# Patient Record
Sex: MALE | Race: WHITE | NOT HISPANIC OR LATINO | Employment: OTHER | URBAN - METROPOLITAN AREA
[De-identification: names, ages, dates, MRNs, and addresses within clinical notes are randomized per-mention and may not be internally consistent; named-entity substitution may affect disease eponyms.]

---

## 2017-07-17 ENCOUNTER — ANESTHESIA EVENT (OUTPATIENT)
Dept: PERIOP | Facility: HOSPITAL | Age: 67
End: 2017-07-17
Payer: MEDICARE

## 2017-07-25 ENCOUNTER — HOSPITAL ENCOUNTER (OUTPATIENT)
Facility: HOSPITAL | Age: 67
Setting detail: OUTPATIENT SURGERY
Discharge: HOME | End: 2017-07-25
Attending: OTOLARYNGOLOGY | Admitting: OTOLARYNGOLOGY
Payer: MEDICARE

## 2017-07-25 ENCOUNTER — ANESTHESIA (OUTPATIENT)
Dept: PERIOP | Facility: HOSPITAL | Age: 67
End: 2017-07-25
Payer: MEDICARE

## 2017-07-25 VITALS
HEART RATE: 85 BPM | DIASTOLIC BLOOD PRESSURE: 61 MMHG | HEIGHT: 73 IN | RESPIRATION RATE: 18 BRPM | SYSTOLIC BLOOD PRESSURE: 124 MMHG | OXYGEN SATURATION: 97 % | WEIGHT: 210 LBS | TEMPERATURE: 97.6 F | BODY MASS INDEX: 27.83 KG/M2

## 2017-07-25 DIAGNOSIS — J34.2 DEVIATED NASAL SEPTUM: ICD-10-CM

## 2017-07-25 DIAGNOSIS — J34.3 HYPERTROPHY OF NASAL TURBINATES: ICD-10-CM

## 2017-07-25 PROCEDURE — 88311 DECALCIFY TISSUE: CPT | Performed by: OTOLARYNGOLOGY

## 2017-07-25 PROCEDURE — 88304 TISSUE EXAM BY PATHOLOGIST: CPT | Performed by: OTOLARYNGOLOGY

## 2017-07-25 RX ORDER — FENTANYL CITRATE 50 UG/ML
INJECTION, SOLUTION INTRAMUSCULAR; INTRAVENOUS AS NEEDED
Status: DISCONTINUED | OUTPATIENT
Start: 2017-07-25 | End: 2017-07-25 | Stop reason: SURG

## 2017-07-25 RX ORDER — GINSENG 100 MG
CAPSULE ORAL AS NEEDED
Status: DISCONTINUED | OUTPATIENT
Start: 2017-07-25 | End: 2017-07-25 | Stop reason: HOSPADM

## 2017-07-25 RX ORDER — FENTANYL CITRATE/PF 50 MCG/ML
50 SYRINGE (ML) INJECTION
Status: DISCONTINUED | OUTPATIENT
Start: 2017-07-25 | End: 2017-07-25 | Stop reason: HOSPADM

## 2017-07-25 RX ORDER — LIDOCAINE HYDROCHLORIDE 10 MG/ML
INJECTION, SOLUTION INFILTRATION; PERINEURAL AS NEEDED
Status: DISCONTINUED | OUTPATIENT
Start: 2017-07-25 | End: 2017-07-25 | Stop reason: SURG

## 2017-07-25 RX ORDER — MIDAZOLAM HYDROCHLORIDE 1 MG/ML
INJECTION INTRAMUSCULAR; INTRAVENOUS AS NEEDED
Status: DISCONTINUED | OUTPATIENT
Start: 2017-07-25 | End: 2017-07-25 | Stop reason: SURG

## 2017-07-25 RX ORDER — ALBUTEROL SULFATE 2.5 MG/3ML
2.5 SOLUTION RESPIRATORY (INHALATION) ONCE AS NEEDED
Status: DISCONTINUED | OUTPATIENT
Start: 2017-07-25 | End: 2017-07-25 | Stop reason: HOSPADM

## 2017-07-25 RX ORDER — PROPOFOL 10 MG/ML
INJECTION, EMULSION INTRAVENOUS AS NEEDED
Status: DISCONTINUED | OUTPATIENT
Start: 2017-07-25 | End: 2017-07-25 | Stop reason: SURG

## 2017-07-25 RX ORDER — LIDOCAINE HYDROCHLORIDE AND EPINEPHRINE 10; 10 MG/ML; UG/ML
INJECTION, SOLUTION INFILTRATION; PERINEURAL AS NEEDED
Status: DISCONTINUED | OUTPATIENT
Start: 2017-07-25 | End: 2017-07-25 | Stop reason: HOSPADM

## 2017-07-25 RX ORDER — EPHEDRINE SULFATE 50 MG/ML
INJECTION, SOLUTION INTRAVENOUS AS NEEDED
Status: DISCONTINUED | OUTPATIENT
Start: 2017-07-25 | End: 2017-07-25 | Stop reason: SURG

## 2017-07-25 RX ORDER — SODIUM CHLORIDE 9 MG/ML
125 INJECTION, SOLUTION INTRAVENOUS CONTINUOUS
Status: DISCONTINUED | OUTPATIENT
Start: 2017-07-25 | End: 2017-07-25 | Stop reason: HOSPADM

## 2017-07-25 RX ORDER — MAGNESIUM HYDROXIDE 1200 MG/15ML
LIQUID ORAL AS NEEDED
Status: DISCONTINUED | OUTPATIENT
Start: 2017-07-25 | End: 2017-07-25 | Stop reason: HOSPADM

## 2017-07-25 RX ORDER — CEPHALEXIN 500 MG/1
CAPSULE ORAL
Refills: 0
Start: 2017-07-25 | End: 2018-01-23

## 2017-07-25 RX ORDER — ROCURONIUM BROMIDE 10 MG/ML
INJECTION, SOLUTION INTRAVENOUS AS NEEDED
Status: DISCONTINUED | OUTPATIENT
Start: 2017-07-25 | End: 2017-07-25 | Stop reason: SURG

## 2017-07-25 RX ORDER — ONDANSETRON 2 MG/ML
4 INJECTION INTRAMUSCULAR; INTRAVENOUS EVERY 6 HOURS PRN
Status: DISCONTINUED | OUTPATIENT
Start: 2017-07-25 | End: 2017-07-25 | Stop reason: HOSPADM

## 2017-07-25 RX ORDER — ONDANSETRON 2 MG/ML
INJECTION INTRAMUSCULAR; INTRAVENOUS AS NEEDED
Status: DISCONTINUED | OUTPATIENT
Start: 2017-07-25 | End: 2017-07-25 | Stop reason: SURG

## 2017-07-25 RX ORDER — MEPERIDINE HYDROCHLORIDE 50 MG/ML
12.5 INJECTION INTRAMUSCULAR; INTRAVENOUS; SUBCUTANEOUS AS NEEDED
Status: DISCONTINUED | OUTPATIENT
Start: 2017-07-25 | End: 2017-07-25 | Stop reason: HOSPADM

## 2017-07-25 RX ORDER — OXYCODONE HYDROCHLORIDE AND ACETAMINOPHEN 5; 325 MG/1; MG/1
2 TABLET ORAL EVERY 4 HOURS PRN
Status: DISCONTINUED | OUTPATIENT
Start: 2017-07-25 | End: 2017-07-25 | Stop reason: HOSPADM

## 2017-07-25 RX ORDER — OXYCODONE HYDROCHLORIDE AND ACETAMINOPHEN 5; 325 MG/1; MG/1
TABLET ORAL
Refills: 0
Start: 2017-07-25 | End: 2018-01-23

## 2017-07-25 RX ORDER — SODIUM CHLORIDE/ALOE VERA
GEL (GRAM) NASAL AS NEEDED
Status: DISCONTINUED | OUTPATIENT
Start: 2017-07-25 | End: 2017-07-25 | Stop reason: HOSPADM

## 2017-07-25 RX ORDER — GLYCOPYRROLATE 0.2 MG/ML
INJECTION INTRAMUSCULAR; INTRAVENOUS AS NEEDED
Status: DISCONTINUED | OUTPATIENT
Start: 2017-07-25 | End: 2017-07-25 | Stop reason: SURG

## 2017-07-25 RX ADMIN — MIDAZOLAM HYDROCHLORIDE 2 MG: 1 INJECTION, SOLUTION INTRAMUSCULAR; INTRAVENOUS at 08:00

## 2017-07-25 RX ADMIN — ONDANSETRON HYDROCHLORIDE 4 MG: 2 INJECTION, SOLUTION INTRAVENOUS at 08:16

## 2017-07-25 RX ADMIN — GLYCOPYRROLATE 0.4 MG: 0.2 INJECTION, SOLUTION INTRAMUSCULAR; INTRAVENOUS at 09:03

## 2017-07-25 RX ADMIN — FENTANYL CITRATE 100 MCG: 50 INJECTION, SOLUTION INTRAMUSCULAR; INTRAVENOUS at 08:05

## 2017-07-25 RX ADMIN — EPHEDRINE SULFATE 10 MG: 50 INJECTION, SOLUTION INTRAMUSCULAR; INTRAVENOUS; SUBCUTANEOUS at 08:35

## 2017-07-25 RX ADMIN — OXYCODONE HYDROCHLORIDE AND ACETAMINOPHEN 2 TABLET: 5; 325 TABLET ORAL at 10:41

## 2017-07-25 RX ADMIN — DEXAMETHASONE SODIUM PHOSPHATE 10 MG: 10 INJECTION INTRAMUSCULAR; INTRAVENOUS at 08:16

## 2017-07-25 RX ADMIN — SODIUM CHLORIDE 125 ML/HR: 0.9 INJECTION, SOLUTION INTRAVENOUS at 07:16

## 2017-07-25 RX ADMIN — LIDOCAINE HYDROCHLORIDE 40 MG: 10 INJECTION, SOLUTION INFILTRATION; PERINEURAL at 08:00

## 2017-07-25 RX ADMIN — NEOSTIGMINE METHYLSULFATE 3 MG: 1 INJECTION, SOLUTION INTRAMUSCULAR; INTRAVENOUS; SUBCUTANEOUS at 09:03

## 2017-07-25 RX ADMIN — ROCURONIUM BROMIDE 30 MG: 10 INJECTION, SOLUTION INTRAVENOUS at 08:00

## 2017-07-25 RX ADMIN — FENTANYL CITRATE 50 MCG: 50 INJECTION INTRAMUSCULAR; INTRAVENOUS at 09:48

## 2017-07-25 RX ADMIN — PROPOFOL 200 MG: 10 INJECTION, EMULSION INTRAVENOUS at 08:00

## 2018-01-23 ENCOUNTER — APPOINTMENT (OUTPATIENT)
Dept: PREADMISSION TESTING | Facility: HOSPITAL | Age: 68
End: 2018-01-23
Payer: MEDICARE

## 2018-01-23 ENCOUNTER — APPOINTMENT (OUTPATIENT)
Dept: LAB | Facility: HOSPITAL | Age: 68
End: 2018-01-23
Attending: ORTHOPAEDIC SURGERY
Payer: MEDICARE

## 2018-01-23 ENCOUNTER — ANESTHESIA EVENT (OUTPATIENT)
Dept: PERIOP | Facility: HOSPITAL | Age: 68
End: 2018-01-23
Payer: MEDICARE

## 2018-01-23 ENCOUNTER — HOSPITAL ENCOUNTER (OUTPATIENT)
Dept: RADIOLOGY | Facility: HOSPITAL | Age: 68
Discharge: HOME | End: 2018-01-23
Attending: ORTHOPAEDIC SURGERY
Payer: MEDICARE

## 2018-01-23 ENCOUNTER — TRANSCRIBE ORDERS (OUTPATIENT)
Dept: ADMINISTRATIVE | Facility: HOSPITAL | Age: 68
End: 2018-01-23

## 2018-01-23 ENCOUNTER — HOSPITAL ENCOUNTER (OUTPATIENT)
Dept: NON INVASIVE DIAGNOSTICS | Facility: HOSPITAL | Age: 68
Discharge: HOME | End: 2018-01-23
Attending: ORTHOPAEDIC SURGERY
Payer: MEDICARE

## 2018-01-23 DIAGNOSIS — M17.12 OSTEOARTHRITIS OF LEFT KNEE, UNSPECIFIED OSTEOARTHRITIS TYPE: ICD-10-CM

## 2018-01-23 DIAGNOSIS — M17.12 OSTEOARTHRITIS OF LEFT KNEE, UNSPECIFIED OSTEOARTHRITIS TYPE: Primary | ICD-10-CM

## 2018-01-23 DIAGNOSIS — Z01.818 PREOP EXAMINATION: ICD-10-CM

## 2018-01-23 LAB
ALBUMIN SERPL BCP-MCNC: 4.1 G/DL (ref 3.5–5)
ALP SERPL-CCNC: 71 U/L (ref 46–116)
ALT SERPL W P-5'-P-CCNC: 38 U/L (ref 12–78)
ANION GAP SERPL CALCULATED.3IONS-SCNC: 9 MMOL/L (ref 4–13)
AST SERPL W P-5'-P-CCNC: 24 U/L (ref 5–45)
ATRIAL RATE: 79 BPM
BACTERIA UR QL AUTO: ABNORMAL /HPF
BASOPHILS # BLD AUTO: 0.02 THOUSANDS/ΜL (ref 0–0.1)
BASOPHILS NFR BLD AUTO: 0 % (ref 0–1)
BILIRUB SERPL-MCNC: 0.34 MG/DL (ref 0.2–1)
BILIRUB UR QL STRIP: NEGATIVE
BUN SERPL-MCNC: 10 MG/DL (ref 5–25)
CALCIUM SERPL-MCNC: 9.3 MG/DL (ref 8.3–10.1)
CHLORIDE SERPL-SCNC: 102 MMOL/L (ref 100–108)
CLARITY UR: CLEAR
CO2 SERPL-SCNC: 30 MMOL/L (ref 21–32)
COLOR UR: YELLOW
CREAT SERPL-MCNC: 1 MG/DL (ref 0.6–1.3)
EOSINOPHIL # BLD AUTO: 0.2 THOUSAND/ΜL (ref 0–0.61)
EOSINOPHIL NFR BLD AUTO: 3 % (ref 0–6)
ERYTHROCYTE [DISTWIDTH] IN BLOOD BY AUTOMATED COUNT: 14.2 % (ref 11.6–15.1)
GFR SERPL CREATININE-BSD FRML MDRD: 78 ML/MIN/1.73SQ M
GLUCOSE SERPL-MCNC: 112 MG/DL (ref 65–140)
GLUCOSE UR STRIP-MCNC: NEGATIVE MG/DL
HCT VFR BLD AUTO: 44.8 % (ref 36.5–49.3)
HGB BLD-MCNC: 14.9 G/DL (ref 12–17)
HGB UR QL STRIP.AUTO: NORMAL
INR PPP: 0.9 (ref 0.86–1.16)
KETONES UR STRIP-MCNC: NEGATIVE MG/DL
LEUKOCYTE ESTERASE UR QL STRIP: NEGATIVE
LYMPHOCYTES # BLD AUTO: 1.72 THOUSANDS/ΜL (ref 0.6–4.47)
LYMPHOCYTES NFR BLD AUTO: 21 % (ref 14–44)
MCH RBC QN AUTO: 31.4 PG (ref 26.8–34.3)
MCHC RBC AUTO-ENTMCNC: 33.3 G/DL (ref 31.4–37.4)
MCV RBC AUTO: 95 FL (ref 82–98)
MONOCYTES # BLD AUTO: 0.75 THOUSAND/ΜL (ref 0.17–1.22)
MONOCYTES NFR BLD AUTO: 9 % (ref 4–12)
NEUTROPHILS # BLD AUTO: 5.44 THOUSANDS/ΜL (ref 1.85–7.62)
NEUTS SEG NFR BLD AUTO: 67 % (ref 43–75)
NITRITE UR QL STRIP: NEGATIVE
NON-SQ EPI CELLS URNS QL MICRO: ABNORMAL /HPF
NRBC BLD AUTO-RTO: 0 /100 WBCS
P AXIS: 43 DEGREES
PH UR STRIP.AUTO: 6 [PH] (ref 4.5–8)
PLATELET # BLD AUTO: 267 THOUSANDS/UL (ref 149–390)
PMV BLD AUTO: 11 FL (ref 8.9–12.7)
POTASSIUM SERPL-SCNC: 4.6 MMOL/L (ref 3.5–5.3)
PR INTERVAL: 148 MS
PROT SERPL-MCNC: 7.6 G/DL (ref 6.4–8.2)
PROT UR STRIP-MCNC: NEGATIVE MG/DL
PROTHROMBIN TIME: 12.1 SECONDS (ref 12.1–14.4)
QRS AXIS: 27 DEGREES
QRSD INTERVAL: 92 MS
QT INTERVAL: 398 MS
QTC INTERVAL: 456 MS
RBC # BLD AUTO: 4.74 MILLION/UL (ref 3.88–5.62)
RBC #/AREA URNS AUTO: ABNORMAL /HPF
SODIUM SERPL-SCNC: 141 MMOL/L (ref 136–145)
SP GR UR STRIP.AUTO: <=1.005 (ref 1–1.03)
T WAVE AXIS: 72 DEGREES
UROBILINOGEN UR QL STRIP.AUTO: 0.2 E.U./DL
VENTRICULAR RATE: 79 BPM
WBC # BLD AUTO: 8.13 THOUSAND/UL (ref 4.31–10.16)
WBC #/AREA URNS AUTO: ABNORMAL /HPF

## 2018-01-23 PROCEDURE — 85025 COMPLETE CBC W/AUTO DIFF WBC: CPT

## 2018-01-23 PROCEDURE — 80053 COMPREHEN METABOLIC PANEL: CPT

## 2018-01-23 PROCEDURE — 81001 URINALYSIS AUTO W/SCOPE: CPT | Performed by: ORTHOPAEDIC SURGERY

## 2018-01-23 PROCEDURE — 93005 ELECTROCARDIOGRAM TRACING: CPT

## 2018-01-23 PROCEDURE — 71046 X-RAY EXAM CHEST 2 VIEWS: CPT

## 2018-01-23 PROCEDURE — 85610 PROTHROMBIN TIME: CPT

## 2018-01-23 PROCEDURE — 36415 COLL VENOUS BLD VENIPUNCTURE: CPT

## 2018-01-23 RX ORDER — GABAPENTIN 300 MG/1
300 CAPSULE ORAL
COMMUNITY
Start: 2018-01-17

## 2018-01-23 RX ORDER — PANTOPRAZOLE SODIUM 40 MG/1
40 TABLET, DELAYED RELEASE ORAL EVERY MORNING
COMMUNITY
Start: 2018-01-19 | End: 2019-04-29

## 2018-01-23 RX ORDER — FLUOXETINE HYDROCHLORIDE 40 MG/1
40 CAPSULE ORAL DAILY
COMMUNITY
Start: 2018-01-17 | End: 2019-04-29

## 2018-01-23 RX ORDER — PRAMIPEXOLE DIHYDROCHLORIDE 0.5 MG/1
0.5 TABLET ORAL
COMMUNITY
Start: 2018-01-22

## 2018-01-23 RX ORDER — ACETAMINOPHEN 500 MG
500-1000 TABLET ORAL EVERY 6 HOURS PRN
COMMUNITY

## 2018-01-23 RX ORDER — PHENOL 1.4 %
30 AEROSOL, SPRAY (ML) MUCOUS MEMBRANE
COMMUNITY

## 2018-01-23 NOTE — PRE-PROCEDURE INSTRUCTIONS
Pre-Surgery Instructions:   Medication Instructions    acetaminophen (TYLENOL) 500 mg tablet Instructed patient per Anesthesia Guidelines   FLUoxetine (PROzac) 40 MG capsule Instructed patient per Anesthesia Guidelines   gabapentin (NEURONTIN) 300 mg capsule Instructed patient per Anesthesia Guidelines   lisinopril (ZESTRIL) 20 mg tablet Instructed patient per Anesthesia Guidelines   Melatonin 10 MG TABS Instructed patient per Anesthesia Guidelines   pantoprazole (PROTONIX) 40 mg tablet Instructed patient per Anesthesia Guidelines   pramipexole (MIRAPEX) 0 5 mg tablet Instructed patient per Anesthesia Guidelines   rosuvastatin (CRESTOR) 20 MG tablet Instructed patient per Anesthesia Guidelines   traMADol (ULTRAM) 50 mg tablet Instructed patient per Anesthesia Guidelines  Instructed to take pantoprazole and fluoxetine am of surgery with sip evangelista morris per anesthesia  [facility]

## 2018-01-23 NOTE — ANESTHESIA PREPROCEDURE EVALUATION
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 as per neurology  No seizures Arthritis     Neurology    Neuromuscular disease , Headaches,   Comment: myoclonus Psychology   Depression ,              Physical Exam    Airway    Mallampati score: II  TM Distance: >3 FB  Neck ROM: full     Dental   upper dentures,     Cardiovascular  Cardiovascular exam normal    Pulmonary  Pulmonary exam normal     Other Findings        Anesthesia Plan  ASA Score- 2     Anesthesia Type- spinal and regional with ASA Monitors  Additional Monitors:   Airway Plan:         Plan Factors-    Induction-     Postoperative Plan- Plan for postoperative opioid use  Informed Consent- Anesthetic plan and risks discussed with patient

## 2018-02-08 RX ORDER — TRANEXAMIC ACID 100 MG/ML
1 INJECTION, SOLUTION INTRAVENOUS ONCE
Status: CANCELLED | OUTPATIENT
Start: 2018-02-09 | End: 2018-02-09

## 2018-02-09 ENCOUNTER — HOSPITAL ENCOUNTER (OUTPATIENT)
Facility: HOSPITAL | Age: 68
Setting detail: OUTPATIENT SURGERY
Discharge: HOME HEALTH | End: 2018-02-10
Attending: ORTHOPAEDIC SURGERY | Admitting: ORTHOPAEDIC SURGERY
Payer: MEDICARE

## 2018-02-09 ENCOUNTER — APPOINTMENT (OUTPATIENT)
Dept: RADIOLOGY | Facility: HOSPITAL | Age: 68
End: 2018-02-09
Payer: MEDICARE

## 2018-02-09 ENCOUNTER — ANESTHESIA (OUTPATIENT)
Dept: PERIOP | Facility: HOSPITAL | Age: 68
End: 2018-02-09
Payer: MEDICARE

## 2018-02-09 DIAGNOSIS — M17.12 PRIMARY OSTEOARTHRITIS OF LEFT KNEE: Primary | ICD-10-CM

## 2018-02-09 LAB
ABO GROUP BLD: NORMAL
BLD GP AB SCN SERPL QL: NEGATIVE
INR PPP: 0.99 (ref 0.86–1.16)
PROTHROMBIN TIME: 13.1 SECONDS (ref 12.1–14.4)
RH BLD: POSITIVE
SPECIMEN EXPIRATION DATE: NORMAL

## 2018-02-09 PROCEDURE — 86901 BLOOD TYPING SEROLOGIC RH(D): CPT | Performed by: ORTHOPAEDIC SURGERY

## 2018-02-09 PROCEDURE — 94762 N-INVAS EAR/PLS OXIMTRY CONT: CPT

## 2018-02-09 PROCEDURE — 73560 X-RAY EXAM OF KNEE 1 OR 2: CPT

## 2018-02-09 PROCEDURE — G8979 MOBILITY GOAL STATUS: HCPCS

## 2018-02-09 PROCEDURE — 97163 PT EVAL HIGH COMPLEX 45 MIN: CPT

## 2018-02-09 PROCEDURE — C1776 JOINT DEVICE (IMPLANTABLE): HCPCS | Performed by: ORTHOPAEDIC SURGERY

## 2018-02-09 PROCEDURE — 86900 BLOOD TYPING SEROLOGIC ABO: CPT | Performed by: ORTHOPAEDIC SURGERY

## 2018-02-09 PROCEDURE — C1713 ANCHOR/SCREW BN/BN,TIS/BN: HCPCS | Performed by: ORTHOPAEDIC SURGERY

## 2018-02-09 PROCEDURE — 86850 RBC ANTIBODY SCREEN: CPT | Performed by: ORTHOPAEDIC SURGERY

## 2018-02-09 PROCEDURE — G8978 MOBILITY CURRENT STATUS: HCPCS

## 2018-02-09 PROCEDURE — 85610 PROTHROMBIN TIME: CPT | Performed by: PHYSICIAN ASSISTANT

## 2018-02-09 PROCEDURE — 94660 CPAP INITIATION&MGMT: CPT

## 2018-02-09 DEVICE — ATTUNE PATELLA MEDIALIZED ANATOMIC 35MM CEMENTED AOX
Type: IMPLANTABLE DEVICE | Site: KNEE | Status: FUNCTIONAL
Brand: ATTUNE

## 2018-02-09 DEVICE — ATTUNE KNEE SYSTEM FEMORAL POSTERIOR STABILIZED SIZE 7 LEFT CEMENTED
Type: IMPLANTABLE DEVICE | Site: KNEE | Status: FUNCTIONAL
Brand: ATTUNE

## 2018-02-09 DEVICE — SMARTSET GMV HIGH PERFORMANCE GENTAMICIN MEDIUM VISCOSITY BONE CEMENT 40G
Type: IMPLANTABLE DEVICE | Site: KNEE | Status: FUNCTIONAL
Brand: SMARTSET

## 2018-02-09 DEVICE — ATTUNE KNEE SYSTEM TIBIAL INSERT FIXED BEARING POSTERIOR STABILIZED 7 6MM AOX
Type: IMPLANTABLE DEVICE | Site: KNEE | Status: FUNCTIONAL
Brand: ATTUNE

## 2018-02-09 DEVICE — ATTUNE KNEE SYSTEM TIBIAL BASE FIXED BEARING SIZE 7 CEMENTED
Type: IMPLANTABLE DEVICE | Site: KNEE | Status: FUNCTIONAL
Brand: ATTUNE

## 2018-02-09 RX ORDER — WARFARIN SODIUM 5 MG/1
5 TABLET ORAL
Status: COMPLETED | OUTPATIENT
Start: 2018-02-09 | End: 2018-02-09

## 2018-02-09 RX ORDER — ONDANSETRON 2 MG/ML
4 INJECTION INTRAMUSCULAR; INTRAVENOUS ONCE AS NEEDED
Status: DISCONTINUED | OUTPATIENT
Start: 2018-02-09 | End: 2018-02-09 | Stop reason: HOSPADM

## 2018-02-09 RX ORDER — ACETAMINOPHEN 325 MG/1
650 TABLET ORAL EVERY 6 HOURS PRN
Status: DISCONTINUED | OUTPATIENT
Start: 2018-02-09 | End: 2018-02-10 | Stop reason: HOSPADM

## 2018-02-09 RX ORDER — OXYCODONE HYDROCHLORIDE 5 MG/1
5 TABLET ORAL EVERY 4 HOURS PRN
Status: DISCONTINUED | OUTPATIENT
Start: 2018-02-10 | End: 2018-02-10 | Stop reason: HOSPADM

## 2018-02-09 RX ORDER — TRAMADOL HYDROCHLORIDE 50 MG/1
50 TABLET ORAL EVERY 6 HOURS PRN
Status: DISCONTINUED | OUTPATIENT
Start: 2018-02-10 | End: 2018-02-10 | Stop reason: HOSPADM

## 2018-02-09 RX ORDER — PRAMIPEXOLE DIHYDROCHLORIDE 0.5 MG/1
0.5 TABLET ORAL
Status: DISCONTINUED | OUTPATIENT
Start: 2018-02-09 | End: 2018-02-10 | Stop reason: HOSPADM

## 2018-02-09 RX ORDER — ONDANSETRON 2 MG/ML
4 INJECTION INTRAMUSCULAR; INTRAVENOUS EVERY 8 HOURS PRN
Status: DISCONTINUED | OUTPATIENT
Start: 2018-02-10 | End: 2018-02-10 | Stop reason: HOSPADM

## 2018-02-09 RX ORDER — ROPIVACAINE HYDROCHLORIDE 5 MG/ML
INJECTION, SOLUTION EPIDURAL; INFILTRATION; PERINEURAL AS NEEDED
Status: DISCONTINUED | OUTPATIENT
Start: 2018-02-09 | End: 2018-02-09 | Stop reason: SURG

## 2018-02-09 RX ORDER — DOCUSATE SODIUM 100 MG/1
100 CAPSULE, LIQUID FILLED ORAL 2 TIMES DAILY
Status: DISCONTINUED | OUTPATIENT
Start: 2018-02-09 | End: 2018-02-10 | Stop reason: HOSPADM

## 2018-02-09 RX ORDER — NALOXONE HYDROCHLORIDE 0.4 MG/ML
0.04 INJECTION, SOLUTION INTRAMUSCULAR; INTRAVENOUS; SUBCUTANEOUS
Status: DISCONTINUED | OUTPATIENT
Start: 2018-02-10 | End: 2018-02-09

## 2018-02-09 RX ORDER — GABAPENTIN 300 MG/1
300 CAPSULE ORAL
Status: DISCONTINUED | OUTPATIENT
Start: 2018-02-09 | End: 2018-02-10 | Stop reason: HOSPADM

## 2018-02-09 RX ORDER — BUPIVACAINE HYDROCHLORIDE 7.5 MG/ML
INJECTION, SOLUTION INTRASPINAL AS NEEDED
Status: DISCONTINUED | OUTPATIENT
Start: 2018-02-09 | End: 2018-02-09 | Stop reason: SURG

## 2018-02-09 RX ORDER — NALOXONE HYDROCHLORIDE 0.4 MG/ML
0.04 INJECTION, SOLUTION INTRAMUSCULAR; INTRAVENOUS; SUBCUTANEOUS
Status: DISCONTINUED | OUTPATIENT
Start: 2018-02-09 | End: 2018-02-10 | Stop reason: HOSPADM

## 2018-02-09 RX ORDER — TRANEXAMIC ACID 100 MG/ML
INJECTION, SOLUTION INTRAVENOUS AS NEEDED
Status: DISCONTINUED | OUTPATIENT
Start: 2018-02-09 | End: 2018-02-09 | Stop reason: HOSPADM

## 2018-02-09 RX ORDER — OXYCODONE HYDROCHLORIDE 5 MG/1
5-10 TABLET ORAL EVERY 4 HOURS PRN
Qty: 60 TABLET | Refills: 0 | Status: SHIPPED | OUTPATIENT
Start: 2018-02-09 | End: 2018-08-23

## 2018-02-09 RX ORDER — MIDAZOLAM HYDROCHLORIDE 1 MG/ML
INJECTION INTRAMUSCULAR; INTRAVENOUS AS NEEDED
Status: DISCONTINUED | OUTPATIENT
Start: 2018-02-09 | End: 2018-02-09 | Stop reason: SURG

## 2018-02-09 RX ORDER — CALCIUM CARBONATE 200(500)MG
1000 TABLET,CHEWABLE ORAL DAILY PRN
Status: DISCONTINUED | OUTPATIENT
Start: 2018-02-09 | End: 2018-02-10 | Stop reason: HOSPADM

## 2018-02-09 RX ORDER — BUPIVACAINE HYDROCHLORIDE 2.5 MG/ML
INJECTION, SOLUTION INFILTRATION; PERINEURAL AS NEEDED
Status: DISCONTINUED | OUTPATIENT
Start: 2018-02-09 | End: 2018-02-09 | Stop reason: HOSPADM

## 2018-02-09 RX ORDER — MAGNESIUM HYDROXIDE 1200 MG/15ML
LIQUID ORAL AS NEEDED
Status: DISCONTINUED | OUTPATIENT
Start: 2018-02-09 | End: 2018-02-09 | Stop reason: HOSPADM

## 2018-02-09 RX ORDER — TRAMADOL HYDROCHLORIDE 50 MG/1
50 TABLET ORAL EVERY 6 HOURS PRN
Qty: 60 TABLET | Refills: 0 | Status: SHIPPED | OUTPATIENT
Start: 2018-02-09 | End: 2019-04-29

## 2018-02-09 RX ORDER — EPHEDRINE SULFATE 50 MG/ML
INJECTION, SOLUTION INTRAVENOUS AS NEEDED
Status: DISCONTINUED | OUTPATIENT
Start: 2018-02-09 | End: 2018-02-09 | Stop reason: SURG

## 2018-02-09 RX ORDER — MORPHINE SULFATE 0.5 MG/ML
INJECTION, SOLUTION EPIDURAL; INTRATHECAL; INTRAVENOUS AS NEEDED
Status: DISCONTINUED | OUTPATIENT
Start: 2018-02-09 | End: 2018-02-09 | Stop reason: SURG

## 2018-02-09 RX ORDER — DIPHENHYDRAMINE HYDROCHLORIDE 50 MG/ML
25 INJECTION INTRAMUSCULAR; INTRAVENOUS EVERY 6 HOURS PRN
Status: ACTIVE | OUTPATIENT
Start: 2018-02-09 | End: 2018-02-10

## 2018-02-09 RX ORDER — ONDANSETRON 2 MG/ML
4 INJECTION INTRAMUSCULAR; INTRAVENOUS EVERY 4 HOURS PRN
Status: ACTIVE | OUTPATIENT
Start: 2018-02-09 | End: 2018-02-10

## 2018-02-09 RX ORDER — KETOROLAC TROMETHAMINE 30 MG/ML
15 INJECTION, SOLUTION INTRAMUSCULAR; INTRAVENOUS EVERY 6 HOURS
Status: DISPENSED | OUTPATIENT
Start: 2018-02-09 | End: 2018-02-10

## 2018-02-09 RX ORDER — KETOROLAC TROMETHAMINE 30 MG/ML
15 INJECTION, SOLUTION INTRAMUSCULAR; INTRAVENOUS EVERY 6 HOURS PRN
Status: DISCONTINUED | OUTPATIENT
Start: 2018-02-09 | End: 2018-02-10 | Stop reason: HOSPADM

## 2018-02-09 RX ORDER — MORPHINE SULFATE 2 MG/ML
2 INJECTION, SOLUTION INTRAMUSCULAR; INTRAVENOUS EVERY 2 HOUR PRN
Status: DISCONTINUED | OUTPATIENT
Start: 2018-02-10 | End: 2018-02-10 | Stop reason: HOSPADM

## 2018-02-09 RX ORDER — BISACODYL 10 MG
10 SUPPOSITORY, RECTAL RECTAL DAILY PRN
Status: DISCONTINUED | OUTPATIENT
Start: 2018-02-09 | End: 2018-02-10 | Stop reason: HOSPADM

## 2018-02-09 RX ORDER — METHOCARBAMOL 500 MG/1
500 TABLET, FILM COATED ORAL EVERY 6 HOURS PRN
Status: DISCONTINUED | OUTPATIENT
Start: 2018-02-09 | End: 2018-02-10 | Stop reason: HOSPADM

## 2018-02-09 RX ORDER — SODIUM CHLORIDE 9 MG/ML
125 INJECTION, SOLUTION INTRAVENOUS CONTINUOUS
Status: DISCONTINUED | OUTPATIENT
Start: 2018-02-09 | End: 2018-02-10 | Stop reason: HOSPADM

## 2018-02-09 RX ORDER — OXYCODONE HYDROCHLORIDE 5 MG/1
5-10 TABLET ORAL EVERY 4 HOURS PRN
Qty: 60 TABLET | Refills: 0 | Status: SHIPPED | OUTPATIENT
Start: 2018-02-09 | End: 2018-02-09

## 2018-02-09 RX ORDER — PANTOPRAZOLE SODIUM 40 MG/1
40 TABLET, DELAYED RELEASE ORAL
Status: DISCONTINUED | OUTPATIENT
Start: 2018-02-09 | End: 2018-02-10 | Stop reason: HOSPADM

## 2018-02-09 RX ORDER — FENTANYL CITRATE 50 UG/ML
INJECTION, SOLUTION INTRAMUSCULAR; INTRAVENOUS AS NEEDED
Status: DISCONTINUED | OUTPATIENT
Start: 2018-02-09 | End: 2018-02-09 | Stop reason: SURG

## 2018-02-09 RX ORDER — ZOLPIDEM TARTRATE 5 MG/1
5 TABLET ORAL
Status: DISCONTINUED | OUTPATIENT
Start: 2018-02-10 | End: 2018-02-10 | Stop reason: HOSPADM

## 2018-02-09 RX ORDER — TRAMADOL HYDROCHLORIDE 50 MG/1
50 TABLET ORAL EVERY 6 HOURS PRN
Qty: 60 TABLET | Refills: 0 | Status: SHIPPED | OUTPATIENT
Start: 2018-02-09 | End: 2018-02-09

## 2018-02-09 RX ORDER — PROPOFOL 10 MG/ML
INJECTION, EMULSION INTRAVENOUS CONTINUOUS PRN
Status: DISCONTINUED | OUTPATIENT
Start: 2018-02-09 | End: 2018-02-09 | Stop reason: SURG

## 2018-02-09 RX ORDER — DIPHENHYDRAMINE HYDROCHLORIDE 50 MG/ML
50 INJECTION INTRAMUSCULAR; INTRAVENOUS EVERY 6 HOURS PRN
Status: DISCONTINUED | OUTPATIENT
Start: 2018-02-10 | End: 2018-02-10 | Stop reason: HOSPADM

## 2018-02-09 RX ORDER — OXYCODONE HYDROCHLORIDE 10 MG/1
10 TABLET ORAL EVERY 4 HOURS PRN
Status: DISCONTINUED | OUTPATIENT
Start: 2018-02-10 | End: 2018-02-10 | Stop reason: HOSPADM

## 2018-02-09 RX ORDER — SODIUM CHLORIDE, SODIUM LACTATE, POTASSIUM CHLORIDE, CALCIUM CHLORIDE 600; 310; 30; 20 MG/100ML; MG/100ML; MG/100ML; MG/100ML
100 INJECTION, SOLUTION INTRAVENOUS CONTINUOUS
Status: DISCONTINUED | OUTPATIENT
Start: 2018-02-09 | End: 2018-02-10 | Stop reason: HOSPADM

## 2018-02-09 RX ORDER — NALBUPHINE HCL 10 MG/ML
5 AMPUL (ML) INJECTION
Status: ACTIVE | OUTPATIENT
Start: 2018-02-09 | End: 2018-02-10

## 2018-02-09 RX ORDER — LISINOPRIL 20 MG/1
20 TABLET ORAL
Status: DISCONTINUED | OUTPATIENT
Start: 2018-02-09 | End: 2018-02-10 | Stop reason: HOSPADM

## 2018-02-09 RX ORDER — FLUOXETINE HYDROCHLORIDE 20 MG/1
40 CAPSULE ORAL DAILY
Status: DISCONTINUED | OUTPATIENT
Start: 2018-02-10 | End: 2018-02-10 | Stop reason: HOSPADM

## 2018-02-09 RX ORDER — METOCLOPRAMIDE HYDROCHLORIDE 5 MG/ML
5 INJECTION INTRAMUSCULAR; INTRAVENOUS EVERY 6 HOURS PRN
Status: ACTIVE | OUTPATIENT
Start: 2018-02-09 | End: 2018-02-10

## 2018-02-09 RX ORDER — TRANEXAMIC ACID 100 MG/ML
INJECTION, SOLUTION INTRAVENOUS AS NEEDED
Status: DISCONTINUED | OUTPATIENT
Start: 2018-02-09 | End: 2018-02-09 | Stop reason: SURG

## 2018-02-09 RX ORDER — DEXAMETHASONE SODIUM PHOSPHATE 4 MG/ML
8 INJECTION, SOLUTION INTRA-ARTICULAR; INTRALESIONAL; INTRAMUSCULAR; INTRAVENOUS; SOFT TISSUE ONCE AS NEEDED
Status: ACTIVE | OUTPATIENT
Start: 2018-02-09 | End: 2018-02-10

## 2018-02-09 RX ORDER — FENTANYL CITRATE/PF 50 MCG/ML
50 SYRINGE (ML) INJECTION AS NEEDED
Status: DISCONTINUED | OUTPATIENT
Start: 2018-02-09 | End: 2018-02-09 | Stop reason: HOSPADM

## 2018-02-09 RX ORDER — ONDANSETRON 2 MG/ML
INJECTION INTRAMUSCULAR; INTRAVENOUS AS NEEDED
Status: DISCONTINUED | OUTPATIENT
Start: 2018-02-09 | End: 2018-02-09 | Stop reason: SURG

## 2018-02-09 RX ADMIN — EPHEDRINE SULFATE 5 MG: 50 INJECTION, SOLUTION INTRAMUSCULAR; INTRAVENOUS; SUBCUTANEOUS at 11:05

## 2018-02-09 RX ADMIN — WARFARIN SODIUM 5 MG: 5 TABLET ORAL at 17:31

## 2018-02-09 RX ADMIN — DEXAMETHASONE SODIUM PHOSPHATE 4 MG: 10 INJECTION INTRAMUSCULAR; INTRAVENOUS at 10:16

## 2018-02-09 RX ADMIN — HYDROMORPHONE HYDROCHLORIDE 0.5 MG: 1 INJECTION, SOLUTION INTRAMUSCULAR; INTRAVENOUS; SUBCUTANEOUS at 20:49

## 2018-02-09 RX ADMIN — KETOROLAC TROMETHAMINE 15 MG: 30 INJECTION, SOLUTION INTRAMUSCULAR at 23:08

## 2018-02-09 RX ADMIN — HYDROMORPHONE HYDROCHLORIDE 0.5 MG: 1 INJECTION, SOLUTION INTRAMUSCULAR; INTRAVENOUS; SUBCUTANEOUS at 23:08

## 2018-02-09 RX ADMIN — EPHEDRINE SULFATE 5 MG: 50 INJECTION, SOLUTION INTRAMUSCULAR; INTRAVENOUS; SUBCUTANEOUS at 10:31

## 2018-02-09 RX ADMIN — SODIUM CHLORIDE, SODIUM LACTATE, POTASSIUM CHLORIDE, AND CALCIUM CHLORIDE 100 ML/HR: .6; .31; .03; .02 INJECTION, SOLUTION INTRAVENOUS at 23:12

## 2018-02-09 RX ADMIN — EPHEDRINE SULFATE 5 MG: 50 INJECTION, SOLUTION INTRAMUSCULAR; INTRAVENOUS; SUBCUTANEOUS at 10:35

## 2018-02-09 RX ADMIN — GABAPENTIN 300 MG: 300 CAPSULE ORAL at 21:30

## 2018-02-09 RX ADMIN — HYDROMORPHONE HYDROCHLORIDE 0.5 MG: 1 INJECTION, SOLUTION INTRAMUSCULAR; INTRAVENOUS; SUBCUTANEOUS at 18:40

## 2018-02-09 RX ADMIN — EPHEDRINE SULFATE 5 MG: 50 INJECTION, SOLUTION INTRAMUSCULAR; INTRAVENOUS; SUBCUTANEOUS at 10:54

## 2018-02-09 RX ADMIN — ONDANSETRON HYDROCHLORIDE 4 MG: 2 INJECTION, SOLUTION INTRAVENOUS at 10:16

## 2018-02-09 RX ADMIN — BUPIVACAINE HYDROCHLORIDE IN DEXTROSE 1.8 ML: 7.5 INJECTION, SOLUTION SUBARACHNOID at 10:08

## 2018-02-09 RX ADMIN — CEFAZOLIN SODIUM 1000 MG: 1 SOLUTION INTRAVENOUS at 17:31

## 2018-02-09 RX ADMIN — CEFAZOLIN SODIUM 2000 MG: 2 SOLUTION INTRAVENOUS at 10:12

## 2018-02-09 RX ADMIN — PROPOFOL 75 MCG/KG/MIN: 10 INJECTION, EMULSION INTRAVENOUS at 10:09

## 2018-02-09 RX ADMIN — EPHEDRINE SULFATE 5 MG: 50 INJECTION, SOLUTION INTRAMUSCULAR; INTRAVENOUS; SUBCUTANEOUS at 10:26

## 2018-02-09 RX ADMIN — TRANEXAMIC ACID 1000 MG: 100 INJECTION, SOLUTION INTRAVENOUS at 10:12

## 2018-02-09 RX ADMIN — PRAMIPEXOLE DIHYDROCHLORIDE 0.5 MG: 0.5 TABLET ORAL at 21:29

## 2018-02-09 RX ADMIN — SODIUM CHLORIDE: 0.9 INJECTION, SOLUTION INTRAVENOUS at 11:23

## 2018-02-09 RX ADMIN — SODIUM CHLORIDE, SODIUM LACTATE, POTASSIUM CHLORIDE, AND CALCIUM CHLORIDE 100 ML/HR: .6; .31; .03; .02 INJECTION, SOLUTION INTRAVENOUS at 13:05

## 2018-02-09 RX ADMIN — MORPHINE SULFATE 0.1 MG: 0.5 INJECTION, SOLUTION EPIDURAL; INTRATHECAL; INTRAVENOUS at 10:08

## 2018-02-09 RX ADMIN — PANTOPRAZOLE SODIUM 40 MG: 40 TABLET, DELAYED RELEASE ORAL at 17:32

## 2018-02-09 RX ADMIN — METHOCARBAMOL 500 MG: 500 TABLET ORAL at 20:45

## 2018-02-09 RX ADMIN — EPHEDRINE SULFATE 10 MG: 50 INJECTION, SOLUTION INTRAMUSCULAR; INTRAVENOUS; SUBCUTANEOUS at 11:25

## 2018-02-09 RX ADMIN — EPHEDRINE SULFATE 5 MG: 50 INJECTION, SOLUTION INTRAMUSCULAR; INTRAVENOUS; SUBCUTANEOUS at 10:45

## 2018-02-09 RX ADMIN — KETOROLAC TROMETHAMINE 15 MG: 30 INJECTION, SOLUTION INTRAMUSCULAR at 17:31

## 2018-02-09 RX ADMIN — SODIUM CHLORIDE 125 ML/HR: 0.9 INJECTION, SOLUTION INTRAVENOUS at 08:28

## 2018-02-09 RX ADMIN — SODIUM CHLORIDE: 0.9 INJECTION, SOLUTION INTRAVENOUS at 11:24

## 2018-02-09 RX ADMIN — MIDAZOLAM HYDROCHLORIDE 2 MG: 1 INJECTION, SOLUTION INTRAMUSCULAR; INTRAVENOUS at 09:16

## 2018-02-09 RX ADMIN — SODIUM CHLORIDE 125 ML/HR: 0.9 INJECTION, SOLUTION INTRAVENOUS at 09:40

## 2018-02-09 RX ADMIN — ROPIVACAINE HYDROCHLORIDE 30 ML: 5 INJECTION, SOLUTION EPIDURAL; INFILTRATION; PERINEURAL at 09:21

## 2018-02-09 RX ADMIN — FENTANYL CITRATE 100 MCG: 50 INJECTION INTRAMUSCULAR; INTRAVENOUS at 09:16

## 2018-02-09 NOTE — OP NOTE
OPERATIVE REPORT  PATIENT NAME: Lizeth Lozano    :  1950  MRN: [id number]  Pt Location: [facility] OR ROOM 02    SURGERY DATE: 2018    Surgeon(s) and Role:     * Katerin Levy PA-C - Dominguez Escudero MD - Primary    Preop Diagnosis:  Primary osteoarthritis of left knee [M17 12]    Post-Op Diagnosis Codes:     * Primary osteoarthritis of left knee [M17 12]    Procedure(s) (LRB):  ARTHROPLASTY KNEE TOTAL (Left)  Primary osteoarthritis of left knee [N50 44]      Complications:   None    Procedure and Technique:    Newt Prescott y o     cc: Knee pian  DATE OF PROCEDURE: 18  PREOPERATIVE DIAGNOSIS: Severe degenerative joint disease   POSTOPERATIVE DIAGNOSIS: same  PROCEDURE PERFORMED: Total Knee Replacement left  IMPLANTS:  Vendor dEUY  Type aTTUNE  Femur 7  Tibia 7  PE 6 FOR 7  Patella 35    SURGEON: MD Sen Wood, [address]  COMPLICATIONS: None   DRAINS: Medium Hemovac reinfusion  ESTIMATED BLOOD LOSS: Minimal  Tranexamic acid locally bathing the tissues  for postoperative blood management  Injection of Tisseel for postoperative  blood management  Injection of Marcaine, Toradol and epinephrine for  postoperative pain management  ANESTHESIA: spinal  DESCRIPTION OF PROCEDURE: The patient was taken to the operating room on the  day of surgery, identified as the above patient and the extremity was identified as the surgical site  The patient was placed on the operating room table in the supine position  Tourniquet was fashioned around the thigh and a Hernandez catheter was placed  The patient received preoperative antibiotics  The patient was then prepped and draped in the usual sterile fashion with Ioban drape placed over the proposed surgical site  With the extremity prepped and draped, the extremity was exsanguinated with an  Esmarch and tourniquet elevated to 300 mmHg  Time out was performed and site marking was identified    Longitudinal incision was made over the anterior aspect of the knee and carried down to the extensor mechanism  A medial parapatellar arthrotomy was created sharply  An extraperiosteal dissection of the medial sleeve was performed  The patella was everted  The knee was brought into hyperflexed position  The anterior cruciate ligament was sacrificed, and the tibia was subluxed anteriorly  The lateral meniscus and 25% of the fat pad was resected en bloc with the posterior cruciate ligament after release of the patellofemoral ligaments  Drill holes were placed in the tibia and the femur and vented with pulsatile lavage irrigant  The tibial jig was placed for adequate resection level  This was perpendicular to the shaft axis of the tibia with anatomic slope  This was checked and found to be perpendicular following osteotomy  Attention was then turned to the femur  Distal femoral resection was an 11mm resection level at distal 5 degree valgus  Slot guide was used in order to reference off the anterior cortex  Sizing was with the use of a Ranawat block  External rotation was judged with lines of Whitesides, transepicondylar axis, and a tension-flexion space  Following rotational considerations, a 4-in-1 cutting block was placed and the femur was cut anterior to posterior, followed by anterior and posterior chamfer cuts  Box-cutting guide finished the femur  A laminar  was used in order to gain access to the posterior aspect of the knee  A combination rongeur and osteotomes were utilized in order to remove posterior osteophytes  Medial meniscal remnant was resected  A trial reduction was performed with appropriate size implants  With implants in place, the patient had a tight flexion gap with full extension  The medial sleeve was tight in full extension with 2 mm of excursion in mid flexion  No lateral instability is encountered  The patella was cut freehand with sagittal saw and finished with a stop-drill   Central tracking of the patella was noted in the trochlear groove with a no-thumbs technique  Trials were removed  The tibia was finished with a drill and Keel punch  Bone blocks were used in order to hold cement mantles  Copious amounts of saline irrigant were used in order to wash out the surgical site  The interstices of bone were cleaned and dried with lap sponges  Cement was placed on the cut surface of the tibia and implant impacted in place  Excess cement removed  Femoral component was placed over cement mantle and impacted in place  Excess cement was then removed  Polyethylene liner was placed  Sequential extension of the knee was performed with removal of cement during sequential extension  The patella was clamped over cement mantle and excess cement removed  With cement cured, any residual cement was removed off the medial and lateral runners  No cement was encountered posteriorly  Central tracking, again, was noted of the patella in the trochlear groove with the no-thumbs technique  The tissues were soaked for approximately 5 minutes at the end of the case while cement was curing with tranexamic acid and saline, in order to decrease the risk of postsurgical bleeding  Following irrigation, the knee was injected in the tibial and femoral metaphyses with Tisseel, in order to gain compressive and biologic hemostasis  The tissues of the periosteum,femur, and medial and lateral posterior capsules were injected with a combination of 30 mL of 0 25% Marcaine with epinephrine and 30 mg of Toradol  for postoperative pain management  Standard closure was performed with  staples and DermaFlex at the level of the skin  PA was present from the time the patient was taken to the operating room until the time the patient was taken to the postanesthesia care unit  They were helpful in technical aspects of the case including retraction, leg holding, assembly of instruments and jigs, and eventual closure       I was present for the entire procedure    Patient Disposition:  PACU     SIGNATURE: Santy Lopez MD  DATE: February 9, 2018  TIME: 11:43 AM

## 2018-02-09 NOTE — PLAN OF CARE
Problem: PHYSICAL THERAPY ADULT  Goal: Performs mobility at highest level of function for planned discharge setting  See evaluation for individualized goals  Treatment/Interventions: Functional transfer training, LE strengthening/ROM, Elevations, Therapeutic exercise, Endurance training, Patient/family training, Equipment eval/education, Bed mobility, Gait training, Compensatory technique education, Continued evaluation, Spoke to nursing  Equipment Recommended: January Vazquez, Other (Comment) (3 in 1 [facility])       See flowsheet documentation for full assessment, interventions and recommendations  Prognosis: Good  Problem List: Decreased strength, Decreased range of motion, Decreased endurance, Impaired balance, Decreased mobility, Decreased safety awareness, Impaired sensation, Orthopedic restrictions, Pain  Assessment: Pt is 81 y/o male admitted for L TKR 2* DJD  WBAT and activity as tolerated POD#0  Baseline indepedent mobiltiy  Currently wtih decreased L knee ROM and strength  Noted post operative RLE patchy numbness per pt report and noted knee buckling with WB despite good strength with testing  Compensates with UE WB on RW  Requires modA for transfers and Gurdeep to take few steps OOB to chair  VSS  Anticipate good progression in order to acheive goals for safe d/c to home with PT and family support  Will need RW, BSC and HHPT  Barriers to Discharge Comments: steps  Recommendation: Home with family support, Home PT     PT - OK to Discharge: No    See flowsheet documentation for full assessment

## 2018-02-09 NOTE — ANESTHESIA PROCEDURE NOTES
Spinal Block    Start time: 2/9/2018 10:02 AM  End time: 2/9/2018 10:08 AM  Reason for block: primary anesthetic  Staffing  Anesthesiologist: Nohemi Aponte  Performed: anesthesiologist   Preanesthetic Checklist  Completed: patient identified, site marked, surgical consent, pre-op evaluation, timeout performed, IV checked, risks and benefits discussed and monitors and equipment checked  Spinal Block  Patient position: sitting  Prep: Betadine  Patient monitoring: continuous pulse ox and frequent blood pressure checks  Approach: midline  Location: L3-4  Injection technique: single-shot  Needle  Needle type: pencil-tip   Needle gauge: 24 G  Needle length: 10 cm  Assessment  Sensory level: [id number] Assessment:  negative aspiration for heme, no paresthesia on injection and positive aspiration for clear CSF

## 2018-02-09 NOTE — ANESTHESIA PROCEDURE NOTES
Peripheral Block    Patient location during procedure: holding area  Start time: 2/9/2018 9:16 AM  End time: 2/9/2018 9:21 AM  Reason for block: at surgeon's request and post-op pain management  Staffing  Anesthesiologist: Nevaeh Negro  Preanesthetic Checklist  Completed: patient identified, site marked, surgical consent, pre-op evaluation, timeout performed, IV checked, risks and benefits discussed and monitors and equipment checked  Peripheral Block  Patient position: supine  Prep: ChloraPrep  Patient monitoring: heart rate, continuous pulse ox and frequent blood pressure checks  Block type: adductor canal block  Laterality: left  Injection technique: single-shot  ultrasound permanent image saved    Local infiltration: ropivacaine  Infiltration strength: 0 5 %  Dose: 30 mL  Needle  Needle type: short-bevel   Needle length: 10 cm  Needle localization: ultrasound guidance  Assessment  Injection assessment: incremental injection, local visualized surrounding nerve on ultrasound, negative aspiration for heme and no paresthesia on injection  Post-procedure:  site cleaned  patient tolerated the procedure well with no immediate complications

## 2018-02-09 NOTE — DISCHARGE INSTRUCTIONS
[id number] Replacement Instructions    MELI Sanchez  Office Phone [phone]      MEDICATIONS  ¨no  Plavix: Patients on Plavix will resume their standard dose while in the hospital    ¨no Enteric Coated Aspirin 325 mg: Take twice daily until your first post operative visit  ¨ Continue as you were in the hospital/rehab facility  ¨ yes Coumadin: If we have started you on this for blood thinning, you will be tested on Mondays and Thursdays  The home health nurse will draw your blood  Our office will select the dosing instructions for you until your next blood draw  If you were on the medication before the surgery, the physician who manages your Coumadin will resume the care of that medication  On the day you have your blood test drawn, do not take your Coumadin dose until contacted by the office  If you have not heard by 4 P  M , please take the dose you took the day before and call the managing doctor's office for instructions in the morning  ¨ Senokot: This is our recommended stool softener  Please use this medication to help prevent constipation that can arise from iron and pain medication use  It is also advisable to increase your fluid intake and fiber in your diet during your felicia and pain medication therapy  ¨ Celebrex: Unless there is a contraindication, you were given a prescription for Celebrex prior to your surgery  Please continue to take daily until you finish the prescription  ¨ Pain Medications: Your prescriptions may run out before you return for your next visit  Please give us two regular office day's notice before you run out, to prevent any problems of not having pain medicine  Be advised that prescriptions for Oxycodone and Vicodin cannot be phoned to your pharmacy  They will need to be picked up at our office  Please refrain from using alcohol with your pain medicines  You may also include Tylenol for pain   You should not exceed 3000 mg of Tylenol in a day  Please be careful to not overdose the Tylenol  ¨ Arthritis/Anti-inflammatory: If you were taking an arthiritis medicine prior to your surgery, please wait until you have stopped the injection blood thinning medicine to resume taking it  ¨ Herbal Medicines: Please hold all these preparations until after your first post-operative visit  ACTIVITY  ¨ Your weight bearing status is: AS TOLERATED  ¨ If you have been furnished with an assistive device  Please use it until your therapist makes a change  ¨ You may participate in activity as tolerated  It is likely that you will tire more easily for a time after surgery  Please rest when you need it to help your healing process  ¨ Stairs are not restricted for you  Please climb stairs as instructed by your physical therapist   Barb Hilario For hip and knee replacement patients please elevate your leg or legs while resting  This is important to prevent lower leg swelling  ¨ When you are back at home you will likely have physical therapy three times a week  On the days you do not have formal therapy please perform the home exercises given to you  ¨ If you had a hip replacement, please follow the safety precautions given to you by the nurse or therapist taking care of you  ¨Immediately following the surgery you are not permitted to drive until cleared by your surgeon  This typically does not happen until your first visit after surgery or later  ¨Knee replacement patients may be passengers in a vehicle following their discharge from the hospital   ¨Hip replacement patients are not allowed in a vehicle, except for your trip home and your first follow up visist  Please follow these guidelines unless specifically instructed to start outpatient therapy at an earlier time  ¨ Please do not perform lifting tasks or strenuous domestic activities  ¨ If you currently are employed, you are off work until cleared by your surgeon   Everyone's ability to return to work is determined by his or her abilities  Your return to work may take a few weeks to a few months  ¨ Sexual activities are permitted as tolerated  ¨Hip replacement patients with standard protocol may ride home in a car from the hospital and first ride to office for post-op appointment  NORMAL FINDINGS  Numbness along the incision   Mechanical click of a knee replacement  Your incision area will feel warm  WOUND CARE  ¨ If you have a tegaderm dressing, you should maintain it for 5-7 days post operatively then remove it, or it will be removed by the home nurses  ¨ It is OK to shower with the tegaderm dressing on  Please do not submerge the incision into a pool, bath or hot tub until after your 1st post-operative visit  ¨ Please do not disturb your staples or the scabs  It promotes better healing and smaller scars  ¨ Cover the incision with gauze dressing until there is no further drainage  ¨ You may leave the incision open to the air after removing your dressing  ¨ Please be generous with the use of ice  It is a very good remedy  Apply ice for only twenty minutes at a time  You may use it every hour  It is recommended to ice before and after anticipated activities, which includes exercise and physical therapy  ¨ Wear your knee-high pressure stockings every day  They may be removed for sleep at night  Continue to wear them until you are seen for your first follow up  ¨ Your staples will be removed about two weeks after your surgery date  Home nurses and physical therapists typically remove them  If they are unable to, please call our office to have us do it for you  FOLLOW UP  ¨ You should have a scheduled visit with your surgeon scheduled for three to four weeks after surgery  If you do not remember your appointment date and time, or if you are sure you do not have one, please call to set one up    ¨ Please inform the office if you experience one of the following:  Fever that is not responding to Tylenol and is above 101 degrees   Redness of the skin that is increasing in area   Your incision is soaking the dressings with drainage or blood   You're unable to bear weight on your operative leg or legs

## 2018-02-09 NOTE — PHYSICAL THERAPY NOTE
PT EVALUATION    80 y o     [id number]    Primary osteoarthritis of left knee [M17 12]    Past Medical History:   Diagnosis Date    Anemia     Anesthesia complication     woke up during surgery-orthopedic surgery LLE    Anxiety     Arthritis     b/l knees, hips, neck    Bee sting allergy     CPAP (continuous positive airway pressure) dependence     Degenerative disc disease, cervical     Depression     GERD (gastroesophageal reflux disease)     Headache     history of silent migraines    History of deep vein thrombosis (DVT) of lower extremity     LLE    Hyperlipidemia     Hypertension     Lumbar herniated disc     sciatica on right    Myoclonic jerking while sleeping     Pneumonia     x3     Right knee pain     and left    Sleep apnea     Use of cane as ambulatory aid     prn    Wears dentures     full upper    Wears glasses          Past Surgical History:   Procedure Laterality Date    BACK SURGERY      lumbar    COLONOSCOPY      FRACTURE SURGERY Left     L tib/fib fx    HERNIA REPAIR      INGUINAL HERNIA REPAIR      KNEE ARTHROSCOPY Left     LUMBAR LAMINECTOMY      L4-L5    NM CAUTER TURBINATE MUCOSA,INTRAMURAL N/A 7/25/2017    Procedure: PARTIAL INFERIOR TURBINATE REDUCTION WITH OUTFRACTURE;  Surgeon: Andrei Newman DO;  [facility]: [facility];  Service: ENT    NM REPAIR OF NASAL SEPTUM N/A 7/25/2017    Procedure: SEPTOPLASTY WITH POSSIBLE RECONSTRUCTION;  Surgeon: Andrei Newman DO;  [facility];  Service: ENT    UMBILICAL HERNIA REPAIR        02/09/18 1510   Note Type   Note type Eval only   Pain Assessment   Pain Assessment No/denies pain   Home Living   Type of [address] One level  (1+1 1 LIZZY    then 2 steps to addition on living room )   [address] [facility]   Additional Comments occasional use of cane   Prior Function   Level of [place] Independent with ADLs and functional mobility   Lives With Spouse   Receives Help From Family   ADL Assistance Independent   IADLs Independent   Falls in the last 6 months 0   Vocational Retired   Comments I PTA  Restrictions/Precautions   Weight Bearing Precautions Per Order Yes   LLE Weight Bearing Per Order WBAT   Other Precautions Fall Risk;Multiple lines  (continuous pulse ox)   General   Additional Pertinent History Pt is 79 y/o male admitted for L TKR 2* DJD  WBAT  Activity as tolerated day of surgery  Family/Caregiver Present Yes   Cognition   Overall Cognitive Status WFL   Orientation Level Oriented X4   RUE Assessment   RUE Assessment WFL   LUE Assessment   LUE Assessment WFL   RLE Assessment   RLE Assessment WFL  (4/5)   LLE Assessment   LLE Assessment X   Strength LLE   L Hip Flexion 3+/5   L Knee Extension 3/5   L Ankle Dorsiflexion 4-/5   L Ankle Plantar Flexion 4-/5   Light Touch   RLE Light Touch Impaired   RLE Light Touch Comments notes patchy numbness post operatively ( nursing aware)   LLE Light Touch Grossly intact   Bed Mobility   Supine to Sit 5  Supervision   Additional items HOB elevated; Bedrails; Increased time required;Verbal cues   Additional Comments /77   Transfers   Sit to Stand 3  Moderate assistance   Additional items Assist x 1; Increased time required  (with bed height elevated)   Stand to Sit 4  Minimal assistance   Additional items Assist x 1; Increased time required;Verbal cues;Armrests  (cues for hand and operative leg placement)   Additional Comments Cues for hand and operative leg placement   Ambulation/Elevation   Gait pattern Improper Weight shift; Antalgic;R Knee Dion;Decreased foot clearance   Gait Assistance 4  Minimal assist   Additional items Assist x 1;Verbal cues; Tactile cues   Assistive Device Rolling walker   Distance 3'x1   using RW to side step to chair     Balance   Static Sitting Good   Dynamic Sitting Fair +   Static Standing Fair -   Dynamic Standing Poor +   Ambulatory Poor   Activity Tolerance   Activity Tolerance Patient tolerated treatment well;Treatment limited secondary to medical complications (Comment)  (numbness RLE)   Medical Staff Made Aware NurseNettie   Nurse Made Aware yes   Assessment   Prognosis Good   Problem List Decreased strength;Decreased range of motion;Decreased endurance; Impaired balance;Decreased mobility; Decreased safety awareness; Impaired sensation;Orthopedic restrictions;Pain   Assessment Pt is 79 y/o male admitted for L TKR 2* DJD  WBAT and activity as tolerated POD#0  Baseline indepedent mobiltiy  Currently wtih decreased L knee ROM and strength  Noted post operative RLE patchy numbness per pt report and noted knee buckling with WB despite good strength with testing  Compensates with UE WB on RW  Requires modA for transfers and Gurdeep to take few steps OOB to chair  VSS  Anticipate good progression in order to acheive goals for safe d/c to home with PT and family support  Will need RW, BSC and HHPT  Barriers to Discharge Comments steps   Goals   Patient Goals go home   STG Expiration Date 02/16/18   Short Term Goal #1 7 days:  Bed mobiltiy with aSng  Transfers with Sang  AMb with RW > 200'x1 with Sang  Negotiate steps usign appropriate technique and S  Treatment Day 0   Plan   Treatment/Interventions Functional transfer training;LE strengthening/ROM; Elevations; Therapeutic exercise; Endurance training;Patient/family training;Equipment eval/education; Bed mobility;Gait training; Compensatory technique education;Continued evaluation;Spoke to nursing   PT Frequency 7x/wk; Twice a day   Recommendation   Recommendation Home with family support;Home PT   Equipment Recommended Walker; Other (Comment)  (3 in 1 BSC)   PT - OK to Discharge No   Additional Comments Must acheive IPPT goals for safe d/c to home     Barthel Index   Feeding 10   Bathing 0   Grooming Score 5   Dressing Score 5   Bladder Score 0   Bowels Score 10   Toilet Use Score 5   Transfers (Bed/Chair) Score 10   Mobility (Level Surface) Score 0   Stairs Score 0   Barthel Index Score 45     History: co - morbidities, fall risk, use of assistive device, multiple lines, LIZZY  Exam: impairments in locomotion, musculoskeletal, balance,posture, joint integrity  Clinical: unstable/unpredictable ( post op pain control, continuous monitoring, AD for mobilty, fall risk)  Complexity:high      Hussain Valentine, PT

## 2018-02-10 VITALS
WEIGHT: 218.4 LBS | HEIGHT: 73 IN | SYSTOLIC BLOOD PRESSURE: 119 MMHG | TEMPERATURE: 97.4 F | OXYGEN SATURATION: 96 % | RESPIRATION RATE: 18 BRPM | HEART RATE: 82 BPM | DIASTOLIC BLOOD PRESSURE: 58 MMHG | BODY MASS INDEX: 28.94 KG/M2

## 2018-02-10 LAB
ANION GAP SERPL CALCULATED.3IONS-SCNC: 7 MMOL/L (ref 4–13)
BUN SERPL-MCNC: 13 MG/DL (ref 5–25)
CALCIUM SERPL-MCNC: 8.2 MG/DL (ref 8.3–10.1)
CHLORIDE SERPL-SCNC: 102 MMOL/L (ref 100–108)
CO2 SERPL-SCNC: 28 MMOL/L (ref 21–32)
CREAT SERPL-MCNC: 0.78 MG/DL (ref 0.6–1.3)
ERYTHROCYTE [DISTWIDTH] IN BLOOD BY AUTOMATED COUNT: 13.7 % (ref 11.6–15.1)
GFR SERPL CREATININE-BSD FRML MDRD: 93 ML/MIN/1.73SQ M
GLUCOSE SERPL-MCNC: 122 MG/DL (ref 65–140)
HCT VFR BLD AUTO: 32.7 % (ref 36.5–49.3)
HGB BLD-MCNC: 10.7 G/DL (ref 12–17)
INR PPP: 0.95 (ref 0.86–1.16)
MCH RBC QN AUTO: 31.3 PG (ref 26.8–34.3)
MCHC RBC AUTO-ENTMCNC: 32.7 G/DL (ref 31.4–37.4)
MCV RBC AUTO: 96 FL (ref 82–98)
PLATELET # BLD AUTO: 228 THOUSANDS/UL (ref 149–390)
PMV BLD AUTO: 10.3 FL (ref 8.9–12.7)
POTASSIUM SERPL-SCNC: 4.3 MMOL/L (ref 3.5–5.3)
PROTHROMBIN TIME: 12.7 SECONDS (ref 12.1–14.4)
RBC # BLD AUTO: 3.42 MILLION/UL (ref 3.88–5.62)
SODIUM SERPL-SCNC: 137 MMOL/L (ref 136–145)
WBC # BLD AUTO: 6.55 THOUSAND/UL (ref 4.31–10.16)

## 2018-02-10 PROCEDURE — 94660 CPAP INITIATION&MGMT: CPT

## 2018-02-10 PROCEDURE — 97116 GAIT TRAINING THERAPY: CPT

## 2018-02-10 PROCEDURE — 85027 COMPLETE CBC AUTOMATED: CPT | Performed by: PHYSICIAN ASSISTANT

## 2018-02-10 PROCEDURE — 80048 BASIC METABOLIC PNL TOTAL CA: CPT | Performed by: PHYSICIAN ASSISTANT

## 2018-02-10 PROCEDURE — 85610 PROTHROMBIN TIME: CPT | Performed by: PHYSICIAN ASSISTANT

## 2018-02-10 PROCEDURE — 97110 THERAPEUTIC EXERCISES: CPT

## 2018-02-10 RX ORDER — WARFARIN SODIUM 2 MG/1
5 TABLET ORAL
Qty: 30 TABLET | Refills: 0 | Status: SHIPPED | OUTPATIENT
Start: 2018-02-10 | End: 2018-08-23

## 2018-02-10 RX ADMIN — HYDROMORPHONE HYDROCHLORIDE 0.5 MG: 1 INJECTION, SOLUTION INTRAMUSCULAR; INTRAVENOUS; SUBCUTANEOUS at 01:41

## 2018-02-10 RX ADMIN — CEFAZOLIN SODIUM 1000 MG: 1 SOLUTION INTRAVENOUS at 02:05

## 2018-02-10 RX ADMIN — DOCUSATE SODIUM 100 MG: 100 CAPSULE, LIQUID FILLED ORAL at 08:44

## 2018-02-10 RX ADMIN — ACETAMINOPHEN 650 MG: 325 TABLET, FILM COATED ORAL at 10:11

## 2018-02-10 RX ADMIN — KETOROLAC TROMETHAMINE 15 MG: 30 INJECTION, SOLUTION INTRAMUSCULAR at 06:39

## 2018-02-10 RX ADMIN — MORPHINE SULFATE 2 MG: 2 INJECTION, SOLUTION INTRAMUSCULAR; INTRAVENOUS at 11:17

## 2018-02-10 RX ADMIN — HYDROMORPHONE HYDROCHLORIDE 0.5 MG: 1 INJECTION, SOLUTION INTRAMUSCULAR; INTRAVENOUS; SUBCUTANEOUS at 08:44

## 2018-02-10 RX ADMIN — ACETAMINOPHEN 650 MG: 325 TABLET, FILM COATED ORAL at 04:25

## 2018-02-10 RX ADMIN — HYDROMORPHONE HYDROCHLORIDE 0.5 MG: 1 INJECTION, SOLUTION INTRAMUSCULAR; INTRAVENOUS; SUBCUTANEOUS at 06:44

## 2018-02-10 RX ADMIN — OXYCODONE HYDROCHLORIDE 10 MG: 10 TABLET ORAL at 13:01

## 2018-02-10 RX ADMIN — HYDROMORPHONE HYDROCHLORIDE 0.5 MG: 1 INJECTION, SOLUTION INTRAMUSCULAR; INTRAVENOUS; SUBCUTANEOUS at 04:26

## 2018-02-10 RX ADMIN — METHOCARBAMOL 500 MG: 500 TABLET ORAL at 11:22

## 2018-02-10 RX ADMIN — HYDROMORPHONE HYDROCHLORIDE 0.5 MG: 1 INJECTION, SOLUTION INTRAMUSCULAR; INTRAVENOUS; SUBCUTANEOUS at 10:11

## 2018-02-10 RX ADMIN — Medication 1 TABLET: at 08:44

## 2018-02-10 RX ADMIN — FLUOXETINE 40 MG: 20 CAPSULE ORAL at 08:44

## 2018-02-10 RX ADMIN — KETOROLAC TROMETHAMINE 15 MG: 30 INJECTION, SOLUTION INTRAMUSCULAR at 11:22

## 2018-02-10 RX ADMIN — PANTOPRAZOLE SODIUM 40 MG: 40 TABLET, DELAYED RELEASE ORAL at 06:39

## 2018-02-10 RX ADMIN — OXYCODONE HYDROCHLORIDE 10 MG: 10 TABLET ORAL at 10:12

## 2018-02-10 NOTE — SOCIAL WORK
CM called patient's room to address discharge needs  Patient reports living in a one story house with spouse  Patient is independent with ADLs and functional mobility PTA  Patient reports using a cane as needed PTA; patient will need  and [facility] for discharge; referral submitted to [facility], delivered by hospital supervisor  Patient will need home PT; referral submitted to [facility]  Patient reports he will have transportation at discharge  No POA identified; caregiver identified as spouse if needed  PCP identified as Michele Haney  No other needs at present  CM to follow as needed

## 2018-02-10 NOTE — PLAN OF CARE

## 2018-02-10 NOTE — PLAN OF CARE
Problem: PHYSICAL THERAPY ADULT  Goal: Performs mobility at highest level of function for planned discharge setting  See evaluation for individualized goals  Treatment/Interventions: Functional transfer training, LE strengthening/ROM, Elevations, Therapeutic exercise, Endurance training, Patient/family training, Equipment eval/education, Bed mobility, Gait training, Compensatory technique education, Continued evaluation, Spoke to nursing  Equipment Recommended: Mateo Roblero (Comment) (3 in 1 [facility])       See flowsheet documentation for full assessment, interventions and recommendations  Outcome: Adequate for Discharge  Prognosis: Excellent  Problem List: Decreased range of motion, Decreased mobility, Impaired balance, Decreased endurance, Pain  Assessment: PT showing good progress toward goals with ability to transfer with mod I and ambulate with supervision assist   Pt progressed to stair and curb step climbing with supervision and cues for technique  PT demonstrates safe transfer and mobility techniques  PT performed supine TKR arom exercises with cues for correct exercise techniques  PT declines need for further PT  PT arom L knee in supine is approximately 70 degrees  Pt appropriate for d/c to home with HHPT and family support and assistance  Barriers to Discharge Comments: steps  Recommendation: Home PT, Home with family support     PT - OK to Discharge: Yes (to home )    See flowsheet documentation for full assessment

## 2018-02-10 NOTE — DISCHARGE SUMMARY
DISCHARGE SUMMARY      Patient Name: Ivet Ricks  Patient MRN: [id number]  Admitting Provider: Anderson Paula MD  Discharging Provider: Anderson Paula MD  [facility] Care [facility] at Discharge[phone]  Admission Date: 2/9/2018       Discharge Date: 2/10/2018     Admission Diagnosis  Primary osteoarthritis of left knee [M17 12]    Discharge Diagnoses  [facility] Course  Ivet Ricks was admitted to [place] [address]  on 2/9/2018, with diagnosis of osteoarthritis in his Left knee  On the day of admission, he was brought to surgery, successfully underwent a Left knee replacement  He tolerated the procedure well  Following surgery, he was taken to the recovery room, at which time, there was a consult placed for Dr Jesse Calhoun for the patient's medical management  After a short stay in the recovery room, he was transferred to the orthopedic floor at which time, he was resumed on his routine home medications per the hospitalist group  On postop day #1, he was able to start some physical therapy and was able to tolerate it well  At this time, he was in stable condition, showing no sign of deep vein thrombosis or pulmonary embolism  Incision was healing well at the time and showed no signs of infection  DISCHARGE DIAGNOSIS: Primary osteoarthritis of left knee [M17 12]  He is now status post Left total knee replacement, which he underwent during the hospital stay  Medications  See after visit summary for reconciled discharge medications provided to patient and family  Allergies  No Known Allergies    Outpatient Follow-Up  No future appointments      Discharge Disposition  home    Operative Procedures Performed  Procedure(s):  ARTHROPLASTY KNEE TOTAL        Julius Garcia PA-C   DISCHARGE SUMMARY

## 2018-02-10 NOTE — PROGRESS NOTES
Orthopedic Total Knee Progress Note    SUBJECTIVE    Status post Left total knee arthroplasty  Systemic or Specific Complaints: No Complaints    OBJECTIVE    Vital signs in last 24 hours:  Temp:  [97 2 °F (36 2 °C)-98 4 °F (36 9 °C)] 97 4 °F (36 3 °C)  HR:  [76-98] 82  Resp:  [14-18] 18  BP: (119-144)/(57-83) 119/58    Neurovascular: Capillary refill: Normal  Dorsiflexion: 5/5  Plantarflexion:  5/5   Wound: Dressing:  clean/dry/intact   DVT Exam: No evidence of DVT seen on physical exam      Data Review:  CBC:   Lab Results   Component Value Date    WBC 6 55 02/10/2018    RBC 3 42 (L) 02/10/2018    HGB 10 7 (L) 02/10/2018    HCT 32 7 (L) 02/10/2018     02/10/2018       ASSESSMENT/PLAN    Status post- Left total knee arthroplasty: Doing well postoperatively      Discharge today, Return to Clinic: as scheduled    Marko Farrar PA-C    Date: 2/10/2018  Time: 12:51 PM

## 2018-02-10 NOTE — PHYSICAL THERAPY NOTE
Physical Therapy Treatment Note       02/10/18 1204   Pain Assessment   Pain Assessment 0-10   Pain Score 7   Pain Type Surgical pain   Pain Location Knee   Pain Orientation Left   Response to Interventions tolerated, satisified   Restrictions/Precautions   LLE Weight Bearing Per Order WBAT   Other Precautions Fall Risk;Pain   General   Chart Reviewed Yes   Response to Previous Treatment Patient with no complaints from previous session  Family/Caregiver Present Yes   Cognition   Overall Cognitive Status WFL   Arousal/Participation Alert; Cooperative   Attention Within functional limits   Orientation Level Oriented X4   Memory Within functional limits   Following Commands Follows all commands and directions without difficulty   Subjective   Subjective " It hurst  I was up and on it this AM  I can move it though "     Bed Mobility   Supine to Sit 6  Modified independent   Additional items HOB elevated   Sit to Supine 6  Modified independent   Additional items HOB elevated   Transfers   Sit to Stand 6  Modified independent   Stand to Sit 6  Modified independent   Ambulation/Elevation   Gait pattern (step through gait pattern)   Gait Assistance 5  Supervision   Additional items Assist x 1   Assistive Device Rolling walker   Distance 50'x2   Stair Management Assistance 5  Supervision   Additional items Assist x 1;Verbal cues   Stair Management Technique Two rails; Nonreciprocal;Foreward   Number of Stairs (5 steps x2)   Curbs platform step performed with use of Rw and supervision assist     Balance   Static Sitting Normal   Dynamic Sitting Good   Static Standing Good   Dynamic Standing Fair +   Ambulatory Fair +   Activity Tolerance   Activity Tolerance Patient tolerated treatment well   Nurse Made Aware Rn Guy   Exercises   TKR Supine;10 reps;AROM; Bilateral   Equipment Use   Comments PT instructed in supine TKR HEP, reveiwed and performed with pt    Pt  Instructed in car transfers, home mangement technques with mobility, ther ex and use of ice and stair and curb step climbng techniques  Assessment   Prognosis Excellent   Problem List Decreased range of motion;Decreased mobility; Impaired balance;Decreased endurance;Pain   Assessment PT showing good progress toward goals with ability to transfer with mod I and ambulate with supervision assist   Pt progressed to stair and curb step climbing with supervision and cues for technique  PT demonstrates safe transfer and mobility techniques  PT performed supine TKR arom exercises with cues for correct exercise techniques  PT declines need for further PT  PT arom L knee in supine is approximately 70 degrees  Pt appropriate for d/c to home with HHPT and family support and assistance  Goals   Patient Goals to go home  STG Expiration Date 02/16/18   Treatment Day 1   Plan   Treatment/Interventions Functional transfer training;LE strengthening/ROM; Elevations; Therapeutic exercise; Endurance training;Patient/family training;Equipment eval/education; Bed mobility;Gait training;Spoke to nursing;Family   Progress Progressing toward goals   PT Frequency 7x/wk; Twice a day   Recommendation   Recommendation Home PT; Home with family support   PT - OK to Discharge Yes  (to home )        02/10/18 1204   Pain Assessment   Pain Assessment 0-10   Pain Score 7   Pain Type Surgical pain   Pain Location Knee   Pain Orientation Left   Response to Interventions tolerated, satisified   Restrictions/Precautions   LLE Weight Bearing Per Order WBAT   Other Precautions Fall Risk;Pain   General   Chart Reviewed Yes   Response to Previous Treatment Patient with no complaints from previous session  Family/Caregiver Present Yes   Cognition   Overall Cognitive Status WFL   Arousal/Participation Alert; Cooperative   Attention Within functional limits   Orientation Level Oriented X4   Memory Within functional limits   Following Commands Follows all commands and directions without difficulty   Subjective Subjective " It hurst  I was up and on it this AM  I can move it though "     Bed Mobility   Supine to Sit 6  Modified independent   Additional items HOB elevated   Sit to Supine 6  Modified independent   Additional items HOB elevated   Transfers   Sit to Stand 6  Modified independent   Stand to Sit 6  Modified independent   Ambulation/Elevation   Gait pattern (step through gait pattern)   Gait Assistance 5  Supervision   Additional items Assist x 1   Assistive Device Rolling walker   Distance 50'x2   Stair Management Assistance 5  Supervision   Additional items Assist x 1;Verbal cues   Stair Management Technique Two rails; Nonreciprocal;Foreward   Number of Stairs (5 steps x2)   Curbs platform step performed with use of Rw and supervision assist     Balance   Static Sitting Normal   Dynamic Sitting Good   Static Standing Good   Dynamic Standing Fair +   Ambulatory Fair +   Activity Tolerance   Activity Tolerance Patient tolerated treatment well   Nurse Made Aware Rn Guy   Exercises   TKR Supine;10 reps;AROM; Bilateral   Equipment Use   Comments PT instructed in supine TKR HEP, reveiwed and performed with pt  Pt  Instructed in car transfers, home mangement technques with mobility, ther ex and use of ice and stair and curb step climbng techniques  Assessment   Prognosis Excellent   Problem List Decreased range of motion;Decreased mobility; Impaired balance;Decreased endurance;Pain   Assessment PT showing good progress toward goals with ability to transfer with mod I and ambulate with supervision assist   Pt progressed to stair and curb step climbing with supervision and cues for technique  PT demonstrates safe transfer and mobility techniques  PT performed supine TKR arom exercises with cues for correct exercise techniques  PT declines need for further PT  PT arom L knee in supine is approximately 70 degrees  Pt appropriate for d/c to home with HHPT and family support and assistance       Goals   Patient Goals to go home  STG Expiration Date 02/16/18   Treatment Day 1   Plan   Treatment/Interventions Functional transfer training;LE strengthening/ROM; Elevations; Therapeutic exercise; Endurance training;Patient/family training;Equipment eval/education; Bed mobility;Gait training;Spoke to nursing;Family   Progress Progressing toward goals   PT Frequency 7x/wk; Twice a day   Recommendation   Recommendation Home PT; Home with family support   PT - OK to Discharge Yes  (to home )       Kasie Penn, PTA

## 2018-03-09 ENCOUNTER — EVALUATION (OUTPATIENT)
Dept: PHYSICAL THERAPY | Facility: CLINIC | Age: 68
End: 2018-03-09
Payer: MEDICARE

## 2018-03-09 DIAGNOSIS — Z96.652 PRESENCE OF LEFT ARTIFICIAL KNEE JOINT: Primary | ICD-10-CM

## 2018-03-09 PROCEDURE — 97110 THERAPEUTIC EXERCISES: CPT | Performed by: PHYSICAL THERAPIST

## 2018-03-09 PROCEDURE — 97140 MANUAL THERAPY 1/> REGIONS: CPT | Performed by: PHYSICAL THERAPIST

## 2018-03-09 PROCEDURE — 97161 PT EVAL LOW COMPLEX 20 MIN: CPT | Performed by: PHYSICAL THERAPIST

## 2018-03-09 PROCEDURE — G8990 OTHER PT/OT CURRENT STATUS: HCPCS | Performed by: PHYSICAL THERAPIST

## 2018-03-09 PROCEDURE — G8991 OTHER PT/OT GOAL STATUS: HCPCS | Performed by: PHYSICAL THERAPIST

## 2018-03-09 NOTE — PROGRESS NOTES
PT Evaluation     Today's date: 3/9/2018  Patient name: Livier Wasserman  : 1950  MRN: [id number]  Referring provider: Cherelle Motta MD  Dx:   Encounter Diagnosis     ICD-10-CM    1  Presence of left artificial knee joint Z96 652                   Assessment  Impairments: abnormal or restricted ROM, activity intolerance and pain with function    Assessment details: Livier Wasserman is a 80 y o  male who presents to outpatient PT with a  Presence of left artificial knee joint  (primary encounter diagnosis)  No further referral appears necessary at this time based upon examination results  Pt presents with decreased strength, ROM, balance, functional activity tolerance, and pain with movement in left knee which is  limiting his ability to perform the aforementioned functional activities  Etiologic factors include repetitive poor body mechanics  Prognosis is good given HEP compliance and PT 3/wk  Please contact me if you have any questions or recommendations  Thank you for the opportunity to share in  Derian's care  Understanding of Dx/Px/POC: good   Prognosis: good    Goals  1  In 4-6 weeks, patient will demonstrate a decrease in pain to 0/10 during functional activities  2  In 4-6 weeks, patient will demonstrate an increase in range of motion by 5 degrees  3  In 4-6 weeks, patient will demonstrate an increase in strength by 1/2 grade on MMT    1  In 6-8 weeks, patient will be independent with their home exercise program  2  In 6-8 weeks, patient will have zero limitations with strength  3  In 6-8 weeks, patient will have zero limitations with ROM  4  In 6-8 weeks, patient will have zero limitations with ambulation  5    In 6-8 weeks, patient will have zero limitations with stair negotiation    Plan  Patient would benefit from: skilled PT  Planned therapy interventions: manual therapy, therapeutic exercise and home exercise program  Frequency: 3x week  Duration in weeks: 4  Plan details: Patient was provided a home exercise program and demonstrated an understanding of exercises  Patient was advised to stop performing home exercise program if symptoms increase or new complaints developed  Verbal understanding demonstrated regarding home exercise program instructions  Surgical incision(s) inspected  Incision(s) clean, dry and intact with no signs/symptoms of infection  Patient was educated on signs/symptoms of infection and was advised to contact MD immediately if suspect infection  Subjective Evaluation    History of Present Illness  Date of surgery: 2018  Mechanism of injury: The patient reports that he had his left knee replaced on   He reports that he had home health PT until last Friday  Pain  Current pain ratin  At best pain rating: 3  At worst pain ratin  Quality: discomfort  Relieving factors: medications and ice  Aggravating factors: stair climbing, walking and standing  Progression: improved    Social Support  Steps to enter house: yes  Stairs in house: no   Lives in: [facility]  Lives with: spouse    Employment status: not working  Treatments  Previous treatment: medication  Current treatment: physical therapy  Patient Goals  Patient goals for therapy: decreased pain, increased strength and increased motion  Patient goal: Return to [facility]  Objective     Active Range of Motion   Left Knee   Flexion: 110 degrees   Extension: 0 degrees     Right Knee   Normal active range of motion    Additional Active Range of Motion Details  Pain and tenderness to L adductors, rectus femoris, and vastus lateralis  Circumference, Joint Line right 40cm   Joint line left 44 5cm    Heel/toe walking - Normal     SLS - Right 10 seconds,  Left 5 seconds  Sensation  Diminished sensation to anterior aspect of L knee joint, Pt unable to feel L knee joint           Strength/Myotome Testing     Left Hip   Planes of Motion   Flexion: 4  Abduction: 4  Adduction: 4    Right Hip   Planes of Motion   Flexion: 4  Abduction: 4  Adduction: 4    Left Knee   Flexion: 3+  Extension: 3+    Right Knee   Flexion: 4  Extension: 4        Daily Treatment Diary     Manual  3/9            L HS  30''3x            L Hip Flexor  30''3x            L Gastroc 30''3x                                          Exercise Diary  3/9            Nu Step             Quad Set              Hip[ Add with Ball 10''10x            Clamshell Blue 3x10            Bridges  5''20x            SLR 4 way              Step Up              SLS             HR/TR              Lunges              CP with LLLDE                                                                                                                                      Modalities

## 2018-03-12 ENCOUNTER — OFFICE VISIT (OUTPATIENT)
Dept: PHYSICAL THERAPY | Facility: CLINIC | Age: 68
End: 2018-03-12
Payer: MEDICARE

## 2018-03-12 DIAGNOSIS — Z96.652 PRESENCE OF LEFT ARTIFICIAL KNEE JOINT: Primary | ICD-10-CM

## 2018-03-12 PROCEDURE — 97110 THERAPEUTIC EXERCISES: CPT | Performed by: PHYSICAL THERAPIST

## 2018-03-12 PROCEDURE — 97140 MANUAL THERAPY 1/> REGIONS: CPT | Performed by: PHYSICAL THERAPIST

## 2018-03-12 NOTE — PROGRESS NOTES
Daily Note     Today's date: 3/12/2018  Patient name: Maria Dolores Qiu  : 1950  MRN: [id number]  Referring provider: Azul Quarles MD  Dx:   Encounter Diagnosis     ICD-10-CM    1  Presence of left artificial knee joint [id number] [id number]                   Subjective: Pt reports that his left knee feels "alright" prior to the start of therapy  Objective: See treatment diary below    Daily Treatment Diary     Manual  3/12            L HS  30''3x            L Hip Flexor  30''3x            L Gastroc 30''3x                                          Exercise Diary  3/12            Nu Step 15min L 5             Quad Set              Hip[ Add with Ball 10''10x            Clamshell Blue 3x10            Bridges  5''20x            SLR 4 way  2x10            Step Up  C 3x10            SLS 30''3x            HR/TR  30x            Lunges  3x10            CP with LLLDE                                                                                                                                      Modalities                                                         Assessment: Tolerated treatment well  Patient demonstrated fatigue post treatment  Pt works at a very fast pace, and requires cuing to slow down pace  Good L knee flexion/extension ROM during manual therapy  Continue to work on improving L knee strength and stability  Plan: Continue per plan of care

## 2018-03-14 ENCOUNTER — OFFICE VISIT (OUTPATIENT)
Dept: PHYSICAL THERAPY | Facility: CLINIC | Age: 68
End: 2018-03-14
Payer: MEDICARE

## 2018-03-14 DIAGNOSIS — Z96.652 PRESENCE OF LEFT ARTIFICIAL KNEE JOINT: Primary | ICD-10-CM

## 2018-03-14 PROCEDURE — 97110 THERAPEUTIC EXERCISES: CPT | Performed by: PHYSICAL THERAPIST

## 2018-03-14 PROCEDURE — 97140 MANUAL THERAPY 1/> REGIONS: CPT | Performed by: PHYSICAL THERAPIST

## 2018-03-14 NOTE — PROGRESS NOTES
Daily Note     Today's date: 3/14/2018  Patient name: Maria Victoria Alba  : 1950  MRN: [id number]  Referring provider: Lizandro Nguyen MD  Dx:   Encounter Diagnosis     ICD-10-CM    1  Presence of left artificial knee joint Z96 652                   Subjective: Pt reports that his knee is feeling fine prior to treatment  Pt reports a pain score of 3/10 prior to treatment  Objective: See treatment diary below  Daily Treatment Diary     Manual  3/14            L HS  30''3x            L Hip Flexor  30''3x            L Gastroc 30''3x                                          Exercise Diary  3/14            Nu Step 15min L 5             Quad Set              Hip[ Add with Ball 10''10x            Clamshell Blue 3x10            Bridges  5''20x            SLR 4 way  2x10            Step Up  C 3x10            SLS 30''3x            HR/TR  30x             Lunges  3x10            CP with LLLDE             Mini squat NV                                                                                                                         Modalities                                                             Assessment: Tolerated treatment well  Patient exhibited good technique with therapeutic exercises  Pt has better tolerance to exercises in standing this session, indicating improved strength in his L knee  Continue to progress as tolerated  Plan: Continue per plan of care

## 2018-03-16 ENCOUNTER — OFFICE VISIT (OUTPATIENT)
Dept: PHYSICAL THERAPY | Facility: CLINIC | Age: 68
End: 2018-03-16
Payer: MEDICARE

## 2018-03-16 DIAGNOSIS — Z96.652 PRESENCE OF LEFT ARTIFICIAL KNEE JOINT: Primary | ICD-10-CM

## 2018-03-16 PROCEDURE — 97140 MANUAL THERAPY 1/> REGIONS: CPT

## 2018-03-16 PROCEDURE — 97110 THERAPEUTIC EXERCISES: CPT

## 2018-03-16 NOTE — PROGRESS NOTES
Daily Note     Today's date: 3/16/2018  Patient name: Nader Badillo  : 1950  MRN: [id number]  Referring provider: Nj Doyle MD  Dx:   Encounter Diagnosis     ICD-10-CM    1  Presence of left artificial knee joint Z96 652        Start Time: 1200  Stop Time: 3580  Total time in clinic (min): 55 minutes    Subjective: Pt reports that he went bowling yesterday  Pt reports that he was sore after he was done, but he was "suprised there wasn't more pain " Pt denies pain at rest        Objective: See treatment diary below  Daily Treatment Diary     Manual  3/16            L HS  30''3x            L Hip Flexor  30''3x            L Gastroc 30''3x            L knee PROM              15'                Exercise Diary  3/16            Nu Step 15min L5             Quad Set              Hip Add with Ball 5'' x20            Clamshell Blue 5" x20            Bridges  5''20x            SLR 4 way  3x10            Step Up  C 3x10            SLS 30''4x            HR/TR  30x             Lunges  3x10            CP with LLLDE             Mini squat 3x10                                                                                                                        Modalities                                                         Assessment: Tolerated treatment well  Pt works at a quick rate of speed and needs to be encourage to take therapeutic rest breaks  Initiated mini squats with good tolerance this date  Patient exhibited good technique with therapeutic exercises and would benefit from continued PT      Plan: Continue per plan of care  Progress treatment as tolerated

## 2018-03-19 ENCOUNTER — OFFICE VISIT (OUTPATIENT)
Dept: PHYSICAL THERAPY | Facility: CLINIC | Age: 68
End: 2018-03-19
Payer: MEDICARE

## 2018-03-19 DIAGNOSIS — Z96.652 PRESENCE OF LEFT ARTIFICIAL KNEE JOINT: Primary | ICD-10-CM

## 2018-03-19 PROCEDURE — 97110 THERAPEUTIC EXERCISES: CPT

## 2018-03-19 NOTE — PROGRESS NOTES
Daily Note     Today's date: 3/19/2018  Patient name: Ayaz Barrow  : 1950  MRN: [id number]  Referring provider: Darrian Pedro MD  Dx:   Encounter Diagnosis     ICD-10-CM    1  Presence of left artificial knee joint Z96 652                   Subjective: Pt reports that she feels better than last visit  He reports he "took it easy" this weekend  Pt denies pain at rest        Objective: See treatment diary below  Daily Treatment Diary     Manual  3/19            L HS  30''3x            L Hip Flexor  30''3x            L Gastroc 30''3x            L knee PROM              15' NP                Exercise Diary  3/19            Nu Step 15min L6             Quad Set              Hip Add with Ball 5'' x20            Clamshell Blue 5" x20            Bridges  5''20x            SLR 4 way  3x10 1#            Step Up  C 3x10            SLS 30''4x            HR/TR  30x             Lunges  3x10            CP with LLLDE             Mini squat 3x10                                                                                                                        Modalities                                                         Assessment: Tolerated treatment well  Initiated light weight during SLR this date with good tolerance  Pt continues to have difficult during SLS on L LE with some knee instability  PROM held this date secondary to Pt time restraints  Will resume full program Nv  Patient exhibited good technique with therapeutic exercises and would benefit from continued PT      Plan: Continue per plan of care  Progress treatment as tolerated

## 2018-03-21 ENCOUNTER — APPOINTMENT (OUTPATIENT)
Dept: PHYSICAL THERAPY | Facility: CLINIC | Age: 68
End: 2018-03-21
Payer: MEDICARE

## 2018-03-23 ENCOUNTER — OFFICE VISIT (OUTPATIENT)
Dept: PHYSICAL THERAPY | Facility: CLINIC | Age: 68
End: 2018-03-23
Payer: MEDICARE

## 2018-03-23 DIAGNOSIS — Z96.652 PRESENCE OF LEFT ARTIFICIAL KNEE JOINT: Primary | ICD-10-CM

## 2018-03-23 PROCEDURE — 97110 THERAPEUTIC EXERCISES: CPT

## 2018-03-23 PROCEDURE — 97140 MANUAL THERAPY 1/> REGIONS: CPT

## 2018-03-23 NOTE — PROGRESS NOTES
Daily Note     Today's date: 3/23/2018  Patient name: Brendon Yang  : 1950  MRN: [id number]  Referring provider: Ubaldo Gibson MD  Dx: No diagnosis found  Subjective: I want to cut back on the ex  I over did it last visit and was sore for 2 days  Usual soreness today  Objective: See treatment diary below    Assessment: Tolerated treatment well  Reduced time and resistance of NuStep today  Added SL Quad stretch  Patient exhibited good technique with therapeutic exercises and would benefit from continued PT  Pt declined CP and will use at home  Plan: Continue per plan of care     Daily Treatment Diary      Manual  3/23                     L HS  30''3x                     L Hip Flexor  30''3x                     L Gastroc 30''3x                     L knee PROM                         15'                            Exercise Diary  3/23                     Nu Step 10min L4                      Quad Set                        Hip Add with Ball 5'' x20                     Clamshell Blue 5" x20                     Bridges  5''20x                     SLR 4 way  3x10 1#                     Step Up  C 3x10                     SLS 30''4x                     HR/TR  30x                      Lunges  3x10                     CP with LLLDE                       Mini squat 3x10                                                                           Modalities

## 2018-03-26 ENCOUNTER — OFFICE VISIT (OUTPATIENT)
Dept: PHYSICAL THERAPY | Facility: CLINIC | Age: 68
End: 2018-03-26
Payer: MEDICARE

## 2018-03-26 DIAGNOSIS — Z96.652 PRESENCE OF LEFT ARTIFICIAL KNEE JOINT: Primary | ICD-10-CM

## 2018-03-26 PROCEDURE — 97140 MANUAL THERAPY 1/> REGIONS: CPT | Performed by: PHYSICAL THERAPIST

## 2018-03-26 PROCEDURE — 97110 THERAPEUTIC EXERCISES: CPT | Performed by: PHYSICAL THERAPIST

## 2018-03-26 NOTE — PROGRESS NOTES
Daily Note     Today's date: 3/26/2018  Patient name: Joshua Souza  : 1950  MRN: [id number]  Referring provider: Simi Hu MD  Dx:   Encounter Diagnosis     ICD-10-CM    1  Presence of left artificial knee joint Z96 652                   Subjective: Pt reports that his left knee is feeling sore today  " I went bowling on Tuesday and it's getting better each time "      Objective: See treatment diary below    Daily Treatment Diary      Manual  3/26                     L HS  30''3x                     L Hip Flexor  30''3x                     L Gastroc 30''3x                     L knee PROM                         15'                            Exercise Diary  3/26                     Nu Step 10min L4                      Quad Set                        Hip Add with Ball 5'' x20                     Clamshell Blue 5" x20                     Bridges  5''20x                     SLR 4 way  3x10 1#                     Step Up  C 3x10                     SLS 30''4x                     HR/TR  30x                      Lunges  3x10                     CP with LLLDE                       Mini squat 3x10                                                                           Modalities                                                                                                             Assessment: Tolerated treatment well  Patient exhibited good technique with therapeutic exercises  Improving L hamstring, gastroc, and hip flexor flexibility during manual therapy  Plan: Continue per plan of care

## 2018-03-28 ENCOUNTER — OFFICE VISIT (OUTPATIENT)
Dept: PHYSICAL THERAPY | Facility: CLINIC | Age: 68
End: 2018-03-28
Payer: MEDICARE

## 2018-03-28 DIAGNOSIS — Z96.652 PRESENCE OF LEFT ARTIFICIAL KNEE JOINT: Primary | ICD-10-CM

## 2018-03-28 PROCEDURE — 97110 THERAPEUTIC EXERCISES: CPT | Performed by: PHYSICAL THERAPIST

## 2018-03-28 PROCEDURE — 97140 MANUAL THERAPY 1/> REGIONS: CPT | Performed by: PHYSICAL THERAPIST

## 2018-03-28 NOTE — PROGRESS NOTES
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  Objective: See treatment diary below  Daily Treatment Diary      Manual  3/28                     L HS  30''3x                     L Hip Flexor  30''3x                     L Gastroc 30''3x                     L knee PROM                         15'                            Exercise Diary  3/28                     Nu Step 10min L4                      Quad Set                        Hip Add with Ball 5'' x20                     Clamshell Blue 5" x20                     Bridges  5''20x                     SLR 4 way  3x10 1#                     Step Up  B + Foam  3x10                     SLS 30''4x                      HR/TR  30x Foam                      Lunges  3x10 Foiam                      CP with LLLDE                       Mini squat 3x10 Foam                                                                            Modalities                                                                                                               Assessment: Tolerated treatment well  Patient exhibited good technique with therapeutic exercises  Added 1 # ankle weights to multiple exercises this session, to help with L knee strengthening  No exacerbation of sx after adding 1# ankle weights  Good L knee flexion/extension in L knee during PROM  Plan: Continue per plan of care

## 2018-03-30 ENCOUNTER — OFFICE VISIT (OUTPATIENT)
Dept: PHYSICAL THERAPY | Facility: CLINIC | Age: 68
End: 2018-03-30
Payer: MEDICARE

## 2018-03-30 DIAGNOSIS — Z96.652 PRESENCE OF LEFT ARTIFICIAL KNEE JOINT: Primary | ICD-10-CM

## 2018-03-30 PROCEDURE — 97150 GROUP THERAPEUTIC PROCEDURES: CPT

## 2018-03-30 PROCEDURE — 97140 MANUAL THERAPY 1/> REGIONS: CPT

## 2018-03-30 NOTE — PROGRESS NOTES
Daily Note     Today's date: 3/30/2018  Patient name: Chyna Artgenia  : 1950  MRN: [id number]  Referring provider: Cait Zavala MD  Dx:   Encounter Diagnosis     ICD-10-CM    1  Presence of left artificial knee joint Z96 652                   Subjective: Pt reports a 2/10 pain in the L knee pre therapy  Pt rate his pain as a 3/10 post treatment  Offers no new comments about his knee this date  Objective: See treatment diary below  Daily Treatment Diary      Manual  3/30                     L HS  30''3x                     L Hip Flexor  30''3x                     L Gastroc 30''3x                     L knee PROM                         15'                            Exercise Diary  3/30                     Nu Step 10min L4                      Quad Set                        Hip Add with Ball 5'' x20                     Clamshell Blue 5" x20                     Bridges  5''20x                     SLR 4 way  3x10 1#                     Step Up  B + Foam  3x10                     SLS 30''4x                      HR/TR  30x Foam                      Lunges  3x10 Foam                      Mini squat  3x10 Foam                     Knee Ext machine   20# 3x10                      HS curl mahine  30# 3x10                                                   Modalities                                                                                                       Assessment: Tolerated treatment well  Initiated weight machines this date to strengthen the lower extremities  PROM of the L LE WNL  Patient demonstrated fatigue post treatment, exhibited good technique with therapeutic exercises and would benefit from continued PT      Plan: Continue per plan of care  Progress treatment as tolerated

## 2018-04-02 ENCOUNTER — APPOINTMENT (OUTPATIENT)
Dept: PHYSICAL THERAPY | Facility: CLINIC | Age: 68
End: 2018-04-02
Payer: MEDICARE

## 2018-04-04 ENCOUNTER — OFFICE VISIT (OUTPATIENT)
Dept: PHYSICAL THERAPY | Facility: CLINIC | Age: 68
End: 2018-04-04
Payer: MEDICARE

## 2018-04-04 DIAGNOSIS — Z96.652 PRESENCE OF LEFT ARTIFICIAL KNEE JOINT: Primary | ICD-10-CM

## 2018-04-04 PROCEDURE — 97140 MANUAL THERAPY 1/> REGIONS: CPT | Performed by: PHYSICAL THERAPIST

## 2018-04-04 PROCEDURE — 97110 THERAPEUTIC EXERCISES: CPT | Performed by: PHYSICAL THERAPIST

## 2018-04-04 NOTE — PROGRESS NOTES
Daily Note     Today's date: 2018  Patient name: Andrew Joyce  : 1950  MRN: [id number]  Referring provider: Cynthia Braga MD  Dx:   Encounter Diagnosis     ICD-10-CM    1  Presence of left artificial knee joint Z96 652                   Subjective: " My left knee is a little stiff today, I think it's the weather "       Objective: See treatment diary below    Daily Treatment Diary      Manual                       L HS  30''3x                     L Hip Flexor  30''3x                     L Gastroc 30''3x                     L knee PROM                         15'                            Exercise Diary                       Nu Step 10min L4                      Quad Set                        Hip Add with Ball 5'' x20                     Clamshell Blue 5" x20                     Bridges  5''20x HOLD                      SLR 4 way  3x10 1#                     Step Up  B + Foam  3x10                     SLS 30''4x                      HR/TR  30x Foam                      Lunges  3x10 Foam                      Mini squat  3x10 Foam                     Knee Ext machine   20# 3x10                      HS curl mahine  30# 3x10                      Leg Press   120 3x10                           Modalities                                                                                                     Assessment: Tolerated treatment well  Patient exhibited good technique with therapeutic exercises  Pt continues to work at an extremely fast pace during PT  Added leg press with 120# this session to help with lower extremity strengthening  Pt notes decreased pain post tx  Plan: Continue per plan of care

## 2018-04-06 ENCOUNTER — OFFICE VISIT (OUTPATIENT)
Dept: PHYSICAL THERAPY | Facility: CLINIC | Age: 68
End: 2018-04-06
Payer: MEDICARE

## 2018-04-06 DIAGNOSIS — Z96.652 PRESENCE OF LEFT ARTIFICIAL KNEE JOINT: Primary | ICD-10-CM

## 2018-04-06 PROCEDURE — 97150 GROUP THERAPEUTIC PROCEDURES: CPT | Performed by: PHYSICAL THERAPIST

## 2018-04-06 NOTE — PROGRESS NOTES
Daily Note     Today's date: 2018  Patient name: Noy Deras  : 1950  MRN: [id number]  Referring provider: Arnoldo Myers MD  Dx:   Encounter Diagnosis     ICD-10-CM    1  Presence of left artificial knee joint Z96 652                   Subjective:  Pt offers no new complaints regarding his left knee joint prior to therapy  Objective: See treatment diary below    Daily Treatment Diary      Manual                       L HS  30''3x                     L Hip Flexor  30''3x                     L Gastroc 30''3x                     L knee PROM                         15'  NP                             Exercise Diary                       Nu Step 10min L4                      Quad Set                        Hip Add with Ball 5'' x20                     Clamshell Blue 5" x20                     Bridges  5''20x HOLD                      SLR 4 way  3x10 1#                     Step Up  B + Foam  3x10                     SLS 30''4x                      HR/TR  30x Foam                      Lunges  3x10 Foam                      Mini squat  3x10 Foam                     Knee Ext machine   20# 3x10                      HS curl mahine  30# 3x10                      Leg Press   120 3x10                           Modalities                                                                                                         Assessment: Tolerated treatment well  Patient exhibited good technique with therapeutic exercises  Pt is (I) with his exercises at therapy  Plan: Continue per plan of care

## 2018-04-09 ENCOUNTER — TRANSCRIBE ORDERS (OUTPATIENT)
Dept: PHYSICAL THERAPY | Facility: CLINIC | Age: 68
End: 2018-04-09

## 2018-04-09 ENCOUNTER — EVALUATION (OUTPATIENT)
Dept: PHYSICAL THERAPY | Facility: CLINIC | Age: 68
End: 2018-04-09
Payer: MEDICARE

## 2018-04-09 DIAGNOSIS — Z96.652 PRESENCE OF LEFT ARTIFICIAL KNEE JOINT: Primary | ICD-10-CM

## 2018-04-09 PROCEDURE — 97164 PT RE-EVAL EST PLAN CARE: CPT | Performed by: PHYSICAL THERAPIST

## 2018-04-09 PROCEDURE — 97110 THERAPEUTIC EXERCISES: CPT | Performed by: PHYSICAL THERAPIST

## 2018-04-09 PROCEDURE — G8991 OTHER PT/OT GOAL STATUS: HCPCS | Performed by: PHYSICAL THERAPIST

## 2018-04-09 PROCEDURE — G8990 OTHER PT/OT CURRENT STATUS: HCPCS | Performed by: PHYSICAL THERAPIST

## 2018-04-09 NOTE — PROGRESS NOTES
Assessment  Impairments: abnormal or restricted ROM, activity intolerance and pain with function    Assessment details: Barbara Gomez has demonstrated decreased pain, increased strength, increased range of motion, and increased activity tolerance since starting physical therapy services  They report an overall improvement of 95% thus far  At this time, patient has achieved their maximum functional benefit from skilled physical therapy services and will be discharged to their [facility]  Patient is in agreement with the plan of care  As a result, patient is discharged from physical therapy  Understanding of Dx/Px/POC: good   Prognosis: good    Goals  1  In 4-6 weeks, patient will demonstrate a decrease in pain to 0/10 during functional activities  MET   2  In 4-6 weeks, patient will demonstrate an increase in range of motion by 5 degrees  MET   3  In 4-6 weeks, patient will demonstrate an increase in strength by 1/2 grade on MMT  MET    1  In 6-8 weeks, patient will be independent with their home exercise program  MET   2  In 6-8 weeks, patient will have zero limitations with strength  Partially Met   3  In 6-8 weeks, patient will have zero limitations with ROM  Partially Met   4  In 6-8 weeks, patient will have zero limitations with ambulation  Partially Met   5  In 6-8 weeks, patient will have zero limitations with stair negotiation  Partially Met  Plan  Patient would benefit from: skilled PT  Planned therapy interventions: manual therapy, therapeutic exercise and home exercise program  Frequency: D/C from Physical therapy   Duration in weeks:  Plan details: Patient was provided a home exercise program and demonstrated an understanding of exercises  Patient was advised to stop performing home exercise program if symptoms increase or new complaints developed  Verbal understanding demonstrated regarding home exercise program instructions  Surgical incision(s) inspected   Incision(s) clean, dry and intact with no signs/symptoms of infection  Patient was educated on signs/symptoms of infection and was advised to contact MD immediately if suspect infection  Subjective Evaluation    History of Present Illness  Date of surgery: 2018  Mechanism of injury:  The patient reports that his left knee feels about 95 percent better since he's started physical therapy  He reports improvement in mobility such as moving, walking, and stair negotiation  Pt has now returned to bowling, and reports mild aches and pains when he bends his knee  Pain  Current pain ratin  At best pain ratin  At worst pain ratin  Quality: discomfort  Relieving factors: medications and ice  Aggravating factors: stair climbing, walking and standing  Progression: improved    Social Support  Steps to enter house: yes  Stairs in house: no   L[facility] in: [facility]  Lives with: spouse    Employment status: not working  Treatments  Previous treatment: medication  Current treatment: physical therapy  Patient Goals  Patient goals for therapy: decreased pain, increased strength and increased motion  Patient goal: Return to [place]  Objective     Active Range of Motion   Left Knee   Flexion: 110 degrees    RA  115 degrees  Extension: 0 degrees   RA   0     Right Knee   Normal active range of motion    Additional Active Range of Motion Details  Pain and tenderness to L adductors, rectus femoris, and vastus lateralis  Circumference, Joint Line right 40cm   Joint line left 44 5cm    Heel/toe walking - Normal     SLS - Right 10 seconds,  Left 5 seconds  Sensation  Diminished sensation to anterior aspect of L knee joint, Pt unable to feel L knee joint           Strength/Myotome Testing     Left Hip   Planes of Motion   Flexion: 4  Abduction: 4  Adduction: 4    Right Hip   Planes of Motion   Flexion: 4  Abduction: 4  Adduction: 4    Left Knee   Flexion: 3+       RA  4  Extension: 3+  RA   4     Right Knee   Flexion: 4  Extension: 4

## 2018-04-09 NOTE — LETTER
April 9, 4755    MD Austin [address] [facility]    Patient: Suzanne Caal   YOB: 1950   Date of Visit: 4/9/2018     Encounter Diagnosis     ICD-10-CM    1  Presence of left artificial knee joint T46 062        Dear Dr Alan Leventhal:    Please review the attached Plan of Care from [address] [place] [address] recent visit  Please verify that you agree therapy should continue by signing the attached document and sending it back to our office  If you have any questions or concerns, please don't hesitate to call  Sincerely,    Merline Kill, PT      Referring Provider:      I certify that I have read the below Plan of Care and certify the need for these services furnished under this plan of treatment while under my care  Christos Kaiser MD  [facility]: [phone]          Assessment  Impairments: abnormal or restricted ROM, activity intolerance and pain with function    Assessment details: Suzanne Caal has demonstrated decreased pain, increased strength, increased range of motion, and increased activity tolerance since starting physical therapy services  They report an overall improvement of 95% thus far  At this time, patient has achieved their maximum functional benefit from skilled physical therapy services and will be discharged to their [facility]  Patient is in agreement with the plan of care  As a result, patient is discharged from physical therapy  Understanding of Dx/Px/POC: good   Prognosis: good    Goals  1  In 4-6 weeks, patient will demonstrate a decrease in pain to 0/10 during functional activities  MET   2  In 4-6 weeks, patient will demonstrate an increase in range of motion by 5 degrees  MET   3  In 4-6 weeks, patient will demonstrate an increase in strength by 1/2 grade on MMT  MET    1  In 6-8 weeks, patient will be independent with their home exercise program  MET   2    In 6-8 weeks, patient will have zero limitations with strength  Partially Met   3  In 6-8 weeks, patient will have zero limitations with ROM  Partially Met   4  In 6-8 weeks, patient will have zero limitations with ambulation  Partially Met   5  In 6-8 weeks, patient will have zero limitations with stair negotiation  Partially Met  Plan  Patient would benefit from: skilled PT  Planned therapy interventions: manual therapy, therapeutic exercise and home exercise program  Frequency: D/C from Physical therapy   Duration in weeks:  Plan details: Patient was provided a home exercise program and demonstrated an understanding of exercises  Patient was advised to stop performing home exercise program if symptoms increase or new complaints developed  Verbal understanding demonstrated regarding home exercise program instructions  Surgical incision(s) inspected  Incision(s) clean, dry and intact with no signs/symptoms of infection  Patient was educated on signs/symptoms of infection and was advised to contact MD immediately if suspect infection  Subjective Evaluation    History of Present Illness  Date of surgery: 2018  Mechanism of injury:  The patient reports that his left knee feels about 95 percent better since he's started physical therapy  He reports improvement in mobility such as moving, walking, and stair negotiation  [facility], and reports mild aches and pains when he bends his knee     Pain  Current pain ratin  At best pain ratin  At worst pain ratin  Quality: discomfort  Relieving factors: medications and ice  Aggravating factors: stair climbing, walking and standing  Progression: improved    Social Support  Steps to enter house: yes  Stairs in house: no   Lives in: [facility]  Lives with: spouse    Employment status: not working  Treatments  Previous treatment: medication  Current treatment: physical therapy  Patient Goals  Patient goals for therapy: decreased pain, increased strength and increased motion  Patient goal: Return to [facility]  Objective     Active Range of Motion   Left Knee   Flexion: 110 degrees    RA 4/9 115 degrees  Extension: 0 degrees   RA 4/9  0     Right Knee   Normal active range of motion    Additional Active Range of Motion Details  Pain and tenderness to L adductors, rectus femoris, and vastus lateralis  Circumference, Joint Line right 40cm   Joint line left 44 5cm    Heel/toe walking - Normal     SLS - Right 10 seconds,  Left 5 seconds  Sensation  Diminished sensation to anterior aspect of L knee joint, Pt unable to feel L knee joint           Strength/Myotome Testing     Left Hip   Planes of Motion   Flexion: 4  Abduction: 4  Adduction: 4    Right Hip   Planes of Motion   Flexion: 4  Abduction: 4  Adduction: 4    Left Knee   Flexion: 3+       RA 4/9 4  Extension: 3+  RA 4/9  4     Right Knee   Flexion: 4  Extension: 4

## 2018-06-18 ENCOUNTER — APPOINTMENT (OUTPATIENT)
Dept: LAB | Facility: CLINIC | Age: 68
End: 2018-06-18
Payer: MEDICARE

## 2018-06-18 ENCOUNTER — TRANSCRIBE ORDERS (OUTPATIENT)
Dept: LAB | Facility: CLINIC | Age: 68
End: 2018-06-18

## 2018-06-18 DIAGNOSIS — M47.812 CERVICAL SPONDYLOSIS WITHOUT MYELOPATHY: ICD-10-CM

## 2018-06-18 DIAGNOSIS — Z79.899 NEED FOR PROPHYLACTIC CHEMOTHERAPY: ICD-10-CM

## 2018-06-18 DIAGNOSIS — M47.816 LUMBAR SPONDYLOSIS: ICD-10-CM

## 2018-06-18 DIAGNOSIS — R25.9 ABNORMAL INVOLUNTARY MOVEMENT: ICD-10-CM

## 2018-06-18 DIAGNOSIS — G47.61 PERIODIC LIMB MOVEMENT DISORDER: ICD-10-CM

## 2018-06-18 DIAGNOSIS — R25.9 ABNORMAL INVOLUNTARY MOVEMENT: Primary | ICD-10-CM

## 2018-06-18 LAB
FERRITIN SERPL-MCNC: 22 NG/ML (ref 8–388)
T4 FREE SERPL-MCNC: 0.76 NG/DL (ref 0.76–1.46)
TSH SERPL DL<=0.05 MIU/L-ACNC: 0.31 UIU/ML (ref 0.36–3.74)
VIT B12 SERPL-MCNC: 366 PG/ML (ref 100–900)

## 2018-06-18 PROCEDURE — 82728 ASSAY OF FERRITIN: CPT

## 2018-06-18 PROCEDURE — 36415 COLL VENOUS BLD VENIPUNCTURE: CPT

## 2018-06-18 PROCEDURE — 84443 ASSAY THYROID STIM HORMONE: CPT

## 2018-06-18 PROCEDURE — 82607 VITAMIN B-12: CPT

## 2018-06-18 PROCEDURE — 84439 ASSAY OF FREE THYROXINE: CPT

## 2018-06-27 ENCOUNTER — TRANSCRIBE ORDERS (OUTPATIENT)
Dept: ADMINISTRATIVE | Facility: HOSPITAL | Age: 68
End: 2018-06-27

## 2018-06-27 DIAGNOSIS — R09.89 ABNORMAL CHEST SOUNDS: Primary | ICD-10-CM

## 2018-07-05 ENCOUNTER — HOSPITAL ENCOUNTER (OUTPATIENT)
Dept: NON INVASIVE DIAGNOSTICS | Facility: HOSPITAL | Age: 68
Discharge: HOME | End: 2018-07-05
Attending: PSYCHIATRY & NEUROLOGY
Payer: MEDICARE

## 2018-07-05 DIAGNOSIS — R09.89 ABNORMAL CHEST SOUNDS: ICD-10-CM

## 2018-07-05 DIAGNOSIS — R09.89 BRUIT: ICD-10-CM

## 2018-07-05 PROCEDURE — 93880 EXTRACRANIAL BILAT STUDY: CPT | Performed by: SURGERY

## 2018-07-05 PROCEDURE — 93880 EXTRACRANIAL BILAT STUDY: CPT

## 2018-08-14 ENCOUNTER — HOSPITAL ENCOUNTER (OUTPATIENT)
Dept: RADIOLOGY | Facility: HOSPITAL | Age: 68
Discharge: HOME | End: 2018-08-14
Payer: MEDICARE

## 2018-08-14 ENCOUNTER — HOSPITAL ENCOUNTER (OUTPATIENT)
Dept: NON INVASIVE DIAGNOSTICS | Facility: HOSPITAL | Age: 68
Discharge: HOME | End: 2018-08-14
Payer: MEDICARE

## 2018-08-14 ENCOUNTER — APPOINTMENT (OUTPATIENT)
Dept: LAB | Facility: HOSPITAL | Age: 68
End: 2018-08-14
Payer: MEDICARE

## 2018-08-14 ENCOUNTER — TRANSCRIBE ORDERS (OUTPATIENT)
Dept: ADMINISTRATIVE | Facility: HOSPITAL | Age: 68
End: 2018-08-14

## 2018-08-14 DIAGNOSIS — D68.8 FAMILIAL MULTIPLE FACTOR DEFICIENCY SYNDROME (HCC): ICD-10-CM

## 2018-08-14 DIAGNOSIS — D68.8 FAMILIAL MULTIPLE FACTOR DEFICIENCY SYNDROME (HCC): Primary | ICD-10-CM

## 2018-08-14 DIAGNOSIS — D38.5 NEOPLASM OF UNCERTAIN BEHAVIOR OF NASAL CAVITIES: ICD-10-CM

## 2018-08-14 LAB
ANION GAP SERPL CALCULATED.3IONS-SCNC: 6 MMOL/L (ref 4–13)
APTT PPP: 27 SECONDS (ref 24–36)
ATRIAL RATE: 87 BPM
BASOPHILS # BLD AUTO: 0.1 THOUSANDS/ΜL (ref 0–0.1)
BASOPHILS NFR BLD AUTO: 1 % (ref 0–2)
BUN SERPL-MCNC: 13 MG/DL (ref 7–25)
CALCIUM SERPL-MCNC: 9.4 MG/DL (ref 8.6–10.5)
CHLORIDE SERPL-SCNC: 103 MMOL/L (ref 98–107)
CO2 SERPL-SCNC: 26 MMOL/L (ref 21–31)
CREAT SERPL-MCNC: 0.93 MG/DL (ref 0.7–1.3)
EOSINOPHIL # BLD AUTO: 0.1 THOUSAND/ΜL (ref 0–0.61)
EOSINOPHIL NFR BLD AUTO: 1 % (ref 0–5)
ERYTHROCYTE [DISTWIDTH] IN BLOOD BY AUTOMATED COUNT: 15.9 % (ref 11.5–14.5)
GFR SERPL CREATININE-BSD FRML MDRD: 85 ML/MIN/1.73SQ M
GLUCOSE P FAST SERPL-MCNC: 113 MG/DL (ref 65–99)
HCT VFR BLD AUTO: 41.6 % (ref 36.5–49.3)
HGB BLD-MCNC: 14.4 G/DL (ref 14–18)
INR PPP: 0.91 (ref 0.9–1.5)
LYMPHOCYTES # BLD AUTO: 1.7 THOUSANDS/ΜL (ref 0.6–4.47)
LYMPHOCYTES NFR BLD AUTO: 23 % (ref 21–51)
MCH RBC QN AUTO: 34.7 PG (ref 26–34)
MCHC RBC AUTO-ENTMCNC: 34.7 G/DL (ref 31–37)
MCV RBC AUTO: 100 FL (ref 81–99)
MONOCYTES # BLD AUTO: 0.6 THOUSAND/ΜL (ref 0.17–1.22)
MONOCYTES NFR BLD AUTO: 8 % (ref 2–12)
NEUTROPHILS # BLD AUTO: 5 THOUSANDS/ΜL (ref 1.4–6.5)
NEUTS SEG NFR BLD AUTO: 66 % (ref 42–75)
NRBC BLD AUTO-RTO: 0 /100 WBCS
P AXIS: 58 DEGREES
PLATELET # BLD AUTO: 283 THOUSANDS/UL (ref 149–390)
PMV BLD AUTO: 7.5 FL (ref 8.6–11.7)
POTASSIUM SERPL-SCNC: 3.9 MMOL/L (ref 3.5–5.5)
PR INTERVAL: 144 MS
PROTHROMBIN TIME: 10.5 SECONDS (ref 10.1–12.9)
QRS AXIS: 24 DEGREES
QRSD INTERVAL: 92 MS
QT INTERVAL: 392 MS
QTC INTERVAL: 471 MS
RBC # BLD AUTO: 4.15 MILLION/UL (ref 4.3–5.9)
SODIUM SERPL-SCNC: 135 MMOL/L (ref 134–143)
T WAVE AXIS: 58 DEGREES
VENTRICULAR RATE: 87 BPM
WBC # BLD AUTO: 7.5 THOUSAND/UL (ref 4.8–10.8)

## 2018-08-14 PROCEDURE — 93005 ELECTROCARDIOGRAM TRACING: CPT

## 2018-08-14 PROCEDURE — 71046 X-RAY EXAM CHEST 2 VIEWS: CPT

## 2018-08-14 PROCEDURE — 80048 BASIC METABOLIC PNL TOTAL CA: CPT

## 2018-08-14 PROCEDURE — 85025 COMPLETE CBC W/AUTO DIFF WBC: CPT

## 2018-08-14 PROCEDURE — 36415 COLL VENOUS BLD VENIPUNCTURE: CPT

## 2018-08-14 PROCEDURE — 85730 THROMBOPLASTIN TIME PARTIAL: CPT

## 2018-08-14 PROCEDURE — 85610 PROTHROMBIN TIME: CPT

## 2018-08-23 RX ORDER — CARISOPRODOL 350 MG/1
350 TABLET ORAL 2 TIMES DAILY PRN
COMMUNITY
End: 2019-04-29

## 2018-08-23 NOTE — PRE-PROCEDURE INSTRUCTIONS
Pre-Surgery Instructions:   Medication Instructions    acetaminophen (TYLENOL) 500 mg tablet Instructed patient per Anesthesia Guidelines   carisoprodol (SOMA) 350 mg tablet Instructed patient per Anesthesia Guidelines   FLUoxetine (PROzac) 40 MG capsule Instructed patient per Anesthesia Guidelines   gabapentin (NEURONTIN) 300 mg capsule Instructed patient per Anesthesia Guidelines   lisinopril (ZESTRIL) 20 mg tablet Instructed patient per Anesthesia Guidelines   Melatonin 10 MG TABS Instructed patient per Anesthesia Guidelines   pantoprazole (PROTONIX) 40 mg tablet Instructed patient per Anesthesia Guidelines   pramipexole (MIRAPEX) 0 5 mg tablet Instructed patient per Anesthesia Guidelines   rosuvastatin (CRESTOR) 20 MG tablet Instructed patient per Anesthesia Guidelines   traMADol (ULTRAM) 50 mg tablet Instructed patient per Anesthesia Guidelines  Per anesthesia patient was told to stop NSAIDS and supplements one week preop and on DOS with sip of water may take Tramadol PRn, Protonix and Prozac with sip of water  Was instructed on use of Chlorhexidine for preoperative bathing per hospital protocol

## 2018-08-24 ENCOUNTER — ANESTHESIA EVENT (OUTPATIENT)
Dept: PERIOP | Facility: HOSPITAL | Age: 68
End: 2018-08-24
Payer: MEDICARE

## 2018-08-28 ENCOUNTER — ANESTHESIA (OUTPATIENT)
Dept: PERIOP | Facility: HOSPITAL | Age: 68
End: 2018-08-28
Payer: MEDICARE

## 2018-08-28 ENCOUNTER — HOSPITAL ENCOUNTER (OUTPATIENT)
Facility: HOSPITAL | Age: 68
Setting detail: OUTPATIENT SURGERY
Discharge: HOME | End: 2018-08-28
Attending: OTOLARYNGOLOGY | Admitting: OTOLARYNGOLOGY
Payer: MEDICARE

## 2018-08-28 VITALS
BODY MASS INDEX: 29.03 KG/M2 | SYSTOLIC BLOOD PRESSURE: 118 MMHG | HEIGHT: 73 IN | HEART RATE: 87 BPM | OXYGEN SATURATION: 94 % | RESPIRATION RATE: 15 BRPM | DIASTOLIC BLOOD PRESSURE: 60 MMHG | TEMPERATURE: 98.4 F | WEIGHT: 219 LBS

## 2018-08-28 DIAGNOSIS — D38.5 NEOPLASM OF UNCERTAIN BEHAVIOR OF OTHER RESPIRATORY ORGANS: Primary | ICD-10-CM

## 2018-08-28 PROCEDURE — 88304 TISSUE EXAM BY PATHOLOGIST: CPT | Performed by: PATHOLOGY

## 2018-08-28 RX ORDER — LIDOCAINE HYDROCHLORIDE 10 MG/ML
INJECTION, SOLUTION INFILTRATION; PERINEURAL AS NEEDED
Status: DISCONTINUED | OUTPATIENT
Start: 2018-08-28 | End: 2018-08-28 | Stop reason: SURG

## 2018-08-28 RX ORDER — LIDOCAINE HYDROCHLORIDE AND EPINEPHRINE 10; 10 MG/ML; UG/ML
INJECTION, SOLUTION INFILTRATION; PERINEURAL AS NEEDED
Status: DISCONTINUED | OUTPATIENT
Start: 2018-08-28 | End: 2018-08-28 | Stop reason: HOSPADM

## 2018-08-28 RX ORDER — FENTANYL CITRATE 50 UG/ML
INJECTION, SOLUTION INTRAMUSCULAR; INTRAVENOUS AS NEEDED
Status: DISCONTINUED | OUTPATIENT
Start: 2018-08-28 | End: 2018-08-28 | Stop reason: SURG

## 2018-08-28 RX ORDER — ONDANSETRON 2 MG/ML
4 INJECTION INTRAMUSCULAR; INTRAVENOUS ONCE AS NEEDED
Status: DISCONTINUED | OUTPATIENT
Start: 2018-08-28 | End: 2018-08-28 | Stop reason: HOSPADM

## 2018-08-28 RX ORDER — SODIUM CHLORIDE 9 MG/ML
125 INJECTION, SOLUTION INTRAVENOUS CONTINUOUS
Status: DISCONTINUED | OUTPATIENT
Start: 2018-08-28 | End: 2018-08-28 | Stop reason: HOSPADM

## 2018-08-28 RX ORDER — OXYCODONE HYDROCHLORIDE AND ACETAMINOPHEN 5; 325 MG/1; MG/1
TABLET ORAL
Refills: 0
Start: 2018-08-28 | End: 2018-12-05

## 2018-08-28 RX ORDER — DIPHENHYDRAMINE HCL 25 MG
25 TABLET ORAL
COMMUNITY
End: 2018-12-05

## 2018-08-28 RX ORDER — OXYCODONE HYDROCHLORIDE AND ACETAMINOPHEN 5; 325 MG/1; MG/1
2 TABLET ORAL EVERY 4 HOURS PRN
Status: DISCONTINUED | OUTPATIENT
Start: 2018-08-28 | End: 2018-08-28 | Stop reason: HOSPADM

## 2018-08-28 RX ORDER — DEXTROSE MONOHYDRATE AND SODIUM CHLORIDE 5; .45 G/100ML; G/100ML
125 INJECTION, SOLUTION INTRAVENOUS CONTINUOUS
Status: DISCONTINUED | OUTPATIENT
Start: 2018-08-28 | End: 2018-08-28 | Stop reason: HOSPADM

## 2018-08-28 RX ORDER — MIDAZOLAM HYDROCHLORIDE 1 MG/ML
INJECTION INTRAMUSCULAR; INTRAVENOUS AS NEEDED
Status: DISCONTINUED | OUTPATIENT
Start: 2018-08-28 | End: 2018-08-28 | Stop reason: SURG

## 2018-08-28 RX ORDER — ROCURONIUM BROMIDE 10 MG/ML
INJECTION, SOLUTION INTRAVENOUS AS NEEDED
Status: DISCONTINUED | OUTPATIENT
Start: 2018-08-28 | End: 2018-08-28 | Stop reason: SURG

## 2018-08-28 RX ORDER — PROPOFOL 10 MG/ML
INJECTION, EMULSION INTRAVENOUS AS NEEDED
Status: DISCONTINUED | OUTPATIENT
Start: 2018-08-28 | End: 2018-08-28 | Stop reason: SURG

## 2018-08-28 RX ORDER — ONDANSETRON 2 MG/ML
4 INJECTION INTRAMUSCULAR; INTRAVENOUS EVERY 6 HOURS PRN
Status: DISCONTINUED | OUTPATIENT
Start: 2018-08-28 | End: 2018-08-28 | Stop reason: HOSPADM

## 2018-08-28 RX ORDER — FENTANYL CITRATE/PF 50 MCG/ML
50 SYRINGE (ML) INJECTION
Status: DISCONTINUED | OUTPATIENT
Start: 2018-08-28 | End: 2018-08-28 | Stop reason: HOSPADM

## 2018-08-28 RX ORDER — MAGNESIUM HYDROXIDE 1200 MG/15ML
LIQUID ORAL AS NEEDED
Status: DISCONTINUED | OUTPATIENT
Start: 2018-08-28 | End: 2018-08-28 | Stop reason: HOSPADM

## 2018-08-28 RX ORDER — EPHEDRINE SULFATE 50 MG/ML
INJECTION, SOLUTION INTRAVENOUS AS NEEDED
Status: DISCONTINUED | OUTPATIENT
Start: 2018-08-28 | End: 2018-08-28 | Stop reason: SURG

## 2018-08-28 RX ADMIN — EPHEDRINE SULFATE 5 MG: 50 INJECTION, SOLUTION INTRAMUSCULAR; INTRAVENOUS; SUBCUTANEOUS at 10:10

## 2018-08-28 RX ADMIN — SODIUM CHLORIDE: 0.9 INJECTION, SOLUTION INTRAVENOUS at 09:52

## 2018-08-28 RX ADMIN — ROCURONIUM BROMIDE 10 MG: 10 INJECTION INTRAVENOUS at 10:02

## 2018-08-28 RX ADMIN — FENTANYL CITRATE 100 MCG: 50 INJECTION, SOLUTION INTRAMUSCULAR; INTRAVENOUS at 09:59

## 2018-08-28 RX ADMIN — MIDAZOLAM 2 MG: 1 INJECTION INTRAMUSCULAR; INTRAVENOUS at 09:53

## 2018-08-28 RX ADMIN — LIDOCAINE HYDROCHLORIDE 80 MG: 10 INJECTION, SOLUTION INFILTRATION; PERINEURAL at 10:02

## 2018-08-28 RX ADMIN — EPHEDRINE SULFATE 5 MG: 50 INJECTION, SOLUTION INTRAMUSCULAR; INTRAVENOUS; SUBCUTANEOUS at 10:19

## 2018-08-28 RX ADMIN — EPHEDRINE SULFATE 5 MG: 50 INJECTION, SOLUTION INTRAMUSCULAR; INTRAVENOUS; SUBCUTANEOUS at 10:15

## 2018-08-28 RX ADMIN — SODIUM CHLORIDE 125 ML/HR: 0.9 INJECTION, SOLUTION INTRAVENOUS at 09:10

## 2018-08-28 RX ADMIN — PROPOFOL 200 MG: 10 INJECTION, EMULSION INTRAVENOUS at 10:02

## 2018-08-28 NOTE — DISCHARGE INSTRUCTIONS
[facility]    POST OPERATIVE SINUS SURGERY INSTRUCTIONS      1  Change drip pad under her nose as needed  2  Keep head of bed elevated  Sleep on at least a few pillows at night  3  Expect and do not become concerned about not being able to breathe through your nose  You will be a mouth-breather for approximately one week  4  You may cleanse crusting from the anterior portion of your nose with hydrogen peroxide and Q-tips  You may use Ocean nasal spray throughout the day to prevent crusting inside nasal cavity and splints  5  Begin using "sinus rinse "2 times daily  If you have nasal splints in place you may start after the nasal splints are removed  6  Do not blow your nose until exactly 1 week after surgery  7  If you must sneeze, sneeze with mouth open  8  Avoid any strenuous activity, exercise, bending, or lifting, until approved by your surgeon  9  If there is significant bleeding, you may use over-the-counter Afrin  Place 2 sprays into the bleeding nostril every 5 minutes up to 3 consecutive times  If bleeding does not stop, call our office at [phone] or [phone] or go directly to the emergency room  10  If you have severe pain which is uncontrolled by medication or a fever greater than 101°F, notify our office immediately at [phone] or [phone]

## 2018-08-28 NOTE — OP NOTE
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]    Procedure(s) (LRB):  LEFT MAXILLARY SINUS ANTROSOTOMY FUNCTIONAL ENDOSCOPIC SINUS SURGERY (N/A)    Specimen(s):  ID Type Source Tests Collected by Time Destination   1 : left maxillary sinus contents Tissue Nasal/Sinus Polyps TISSUE EXAM Tarsha Ford DO 2018 1023        Estimated Blood Loss:   Minimal    Drains:  Closed/Suction Drain Left Knee Accordion 10 Fr  (Active)   Number of days: 200       Anesthesia Type:   General    Operative Indications:  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]      Operative Findings:  1  Cyst at the base of the left maxillary sinus  2  Small erosion of the anterior portion of the left inferior turbinate    Complications:   None    Procedure and Technique:  Patient was identified in the holding area and was marked on the left side of the face to denote the laterality of the case  He was taken to the operating room placed under general anesthesia in supine position  He was turned 90° and placed in normal position for the surgery  Brief time-out was obtained  Patient name, date of birth, procedure, and allergies were noted  Cocaine soaked cotton pledgets x2 were placed in the left nasal cavity  There were left in place while he was prepped and draped in the usual fashion for the above surgery  Once this was completed a formal time-out was obtained and all information was noted to be correct  At this point the cocaine pledgets were removed and using 0 degree endoscopic guidance 1% xylocaine with 1 100,000 epinephrine was injected into the middle turbinate and uncinate  The endoscopic be revealed that there was a small erosion of the anterior portion of the inferior turbinate from prior turbinates surgery  This was not causing any issues and was left alone  I medialized the middle turbinate using a Germaine elevator and then used a sickle knife from a superior to inferior direction to remove the uncinate  This was further removed using a Blakesley forcep  I opened the maxillary sinus widely using through cut Blakesley forceps and a backbiting forcep  Then using 45 degree scope I was able to evaluate the sinus demonstrating a cyst at the base of the sinus  I was only able to get to the cyst by using frontal sinus instruments and rupturing the cyst   I then further removed any of the cystic wall with the sinus instruments as well  At the completion there was no bleeding  No packing was used  At this point the patient was turned back to normal position    He was woken, extubated, and taken to recovery in stable condition   I was present for the entire procedure    Patient Disposition:  PACU     SIGNATURE: Ro Buckley DO  DATE: August 28, 2018  TIME: 10:37 AM

## 2018-08-28 NOTE — ANESTHESIA PREPROCEDURE EVALUATION
Review of Systems/Medical History          Cardiovascular  Hyperlipidemia, Hypertension , DVT (LLE)   Pulmonary  Pneumonia (X3), Sleep apnea CPAP,        GI/Hepatic    GERD well controlled,             Endo/Other  Negative endo/other ROS      GYN       Hematology  Anemia iron deficiency anemia,     Musculoskeletal  Back pain , cervical pain and lumbar pain, Sciatica (right),   Arthritis     Neurology    TIA, Headaches,    Psychology   Anxiety, Depression ,              Physical Exam    Airway    Mallampati score: II  TM Distance: >3 FB  Neck ROM: full     Dental   upper dentures,     Cardiovascular  Rhythm: regular, Rate: normal, Cardiovascular exam normal    Pulmonary  Breath sounds clear to auscultation,     Other Findings        Anesthesia Plan  ASA Score- 3     Anesthesia Type- general with ASA Monitors  Additional Monitors:   Airway Plan:         Plan Factors-Patient not instructed to abstain from smoking on day of procedure       Induction- intravenous  Postoperative Plan- Plan for postoperative opioid use  Informed Consent- Anesthetic plan and risks discussed with patient

## 2018-08-28 NOTE — NURSING NOTE
1145-Spoke to Dr John Holland on cell phone per pt request who is asking if he can vape during his recovery period  D/C instructions reviewed w/pt and spouse prior to discharge, however, pt still requesting Dr John Holland be notified  Dr John Holland advised not to vape and pt was made aware   Pt stated he understood

## 2018-12-04 ENCOUNTER — ANESTHESIA EVENT (OUTPATIENT)
Dept: PERIOP | Facility: HOSPITAL | Age: 68
End: 2018-12-04

## 2018-12-04 RX ORDER — SODIUM CHLORIDE 9 MG/ML
125 INJECTION, SOLUTION INTRAVENOUS CONTINUOUS
Status: CANCELLED | OUTPATIENT
Start: 2018-12-04

## 2018-12-05 ENCOUNTER — HOSPITAL ENCOUNTER (OUTPATIENT)
Dept: NON INVASIVE DIAGNOSTICS | Facility: HOSPITAL | Age: 68
Discharge: HOME | End: 2018-12-05
Attending: ORTHOPAEDIC SURGERY
Payer: MEDICARE

## 2018-12-05 ENCOUNTER — HOSPITAL ENCOUNTER (OUTPATIENT)
Dept: RADIOLOGY | Facility: HOSPITAL | Age: 68
Discharge: HOME | End: 2018-12-05
Attending: ORTHOPAEDIC SURGERY
Payer: MEDICARE

## 2018-12-05 ENCOUNTER — APPOINTMENT (OUTPATIENT)
Dept: LAB | Facility: HOSPITAL | Age: 68
End: 2018-12-05
Attending: ORTHOPAEDIC SURGERY
Payer: MEDICARE

## 2018-12-05 ENCOUNTER — TRANSCRIBE ORDERS (OUTPATIENT)
Dept: ADMINISTRATIVE | Facility: HOSPITAL | Age: 68
End: 2018-12-05

## 2018-12-05 ENCOUNTER — APPOINTMENT (OUTPATIENT)
Dept: PREADMISSION TESTING | Facility: HOSPITAL | Age: 68
End: 2018-12-05
Payer: MEDICARE

## 2018-12-05 DIAGNOSIS — M48.062 PSEUDOCLAUDICATION SYNDROME: ICD-10-CM

## 2018-12-05 DIAGNOSIS — Z01.818 PREOP EXAMINATION: ICD-10-CM

## 2018-12-05 DIAGNOSIS — Z01.818 PREOP EXAMINATION: Primary | ICD-10-CM

## 2018-12-05 LAB
25(OH)D3 SERPL-MCNC: 8.5 NG/ML (ref 30–100)
ABO GROUP BLD: NORMAL
ALBUMIN SERPL BCP-MCNC: 3.4 G/DL (ref 3.5–5)
ALP SERPL-CCNC: 63 U/L (ref 46–116)
ALT SERPL W P-5'-P-CCNC: 21 U/L (ref 12–78)
ANION GAP SERPL CALCULATED.3IONS-SCNC: 13 MMOL/L (ref 4–13)
AST SERPL W P-5'-P-CCNC: 17 U/L (ref 5–45)
BASOPHILS # BLD AUTO: 0.04 THOUSANDS/ΜL (ref 0–0.1)
BASOPHILS NFR BLD AUTO: 1 % (ref 0–1)
BILIRUB SERPL-MCNC: 0.26 MG/DL (ref 0.2–1)
BILIRUB UR QL STRIP: NEGATIVE
BLD GP AB SCN SERPL QL: NEGATIVE
BUN SERPL-MCNC: 4 MG/DL (ref 5–25)
CALCIUM SERPL-MCNC: 8.7 MG/DL (ref 8.3–10.1)
CHLORIDE SERPL-SCNC: 94 MMOL/L (ref 100–108)
CLARITY UR: CLEAR
CO2 SERPL-SCNC: 20 MMOL/L (ref 21–32)
COLOR UR: YELLOW
CREAT SERPL-MCNC: 0.84 MG/DL (ref 0.6–1.3)
EOSINOPHIL # BLD AUTO: 0.41 THOUSAND/ΜL (ref 0–0.61)
EOSINOPHIL NFR BLD AUTO: 5 % (ref 0–6)
ERYTHROCYTE [DISTWIDTH] IN BLOOD BY AUTOMATED COUNT: 13 % (ref 11.6–15.1)
ERYTHROCYTE [SEDIMENTATION RATE] IN BLOOD: 24 MM/HOUR (ref 0–10)
GFR SERPL CREATININE-BSD FRML MDRD: 91 ML/MIN/1.73SQ M
GLUCOSE SERPL-MCNC: 118 MG/DL (ref 65–140)
GLUCOSE UR STRIP-MCNC: NEGATIVE MG/DL
HCT VFR BLD AUTO: 41.2 % (ref 36.5–49.3)
HGB BLD-MCNC: 14 G/DL (ref 12–17)
HGB UR QL STRIP.AUTO: NEGATIVE
IMM GRANULOCYTES # BLD AUTO: 0.14 THOUSAND/UL (ref 0–0.2)
IMM GRANULOCYTES NFR BLD AUTO: 2 % (ref 0–2)
KETONES UR STRIP-MCNC: NEGATIVE MG/DL
LEUKOCYTE ESTERASE UR QL STRIP: NEGATIVE
LYMPHOCYTES # BLD AUTO: 1.48 THOUSANDS/ΜL (ref 0.6–4.47)
LYMPHOCYTES NFR BLD AUTO: 19 % (ref 14–44)
MCH RBC QN AUTO: 35.6 PG (ref 26.8–34.3)
MCHC RBC AUTO-ENTMCNC: 34 G/DL (ref 31.4–37.4)
MCV RBC AUTO: 105 FL (ref 82–98)
MONOCYTES # BLD AUTO: 0.87 THOUSAND/ΜL (ref 0.17–1.22)
MONOCYTES NFR BLD AUTO: 11 % (ref 4–12)
NEUTROPHILS # BLD AUTO: 4.91 THOUSANDS/ΜL (ref 1.85–7.62)
NEUTS SEG NFR BLD AUTO: 62 % (ref 43–75)
NITRITE UR QL STRIP: NEGATIVE
NRBC BLD AUTO-RTO: 0 /100 WBCS
PH UR STRIP.AUTO: 5.5 [PH] (ref 4.5–8)
PLATELET # BLD AUTO: 388 THOUSANDS/UL (ref 149–390)
PMV BLD AUTO: 9.1 FL (ref 8.9–12.7)
POTASSIUM SERPL-SCNC: 3.6 MMOL/L (ref 3.5–5.3)
PROT SERPL-MCNC: 7.1 G/DL (ref 6.4–8.2)
PROT UR STRIP-MCNC: NEGATIVE MG/DL
RBC # BLD AUTO: 3.93 MILLION/UL (ref 3.88–5.62)
RH BLD: POSITIVE
SODIUM SERPL-SCNC: 127 MMOL/L (ref 136–145)
SP GR UR STRIP.AUTO: >=1.03 (ref 1–1.03)
SPECIMEN EXPIRATION DATE: NORMAL
UROBILINOGEN UR QL STRIP.AUTO: 0.2 E.U./DL
WBC # BLD AUTO: 7.85 THOUSAND/UL (ref 4.31–10.16)

## 2018-12-05 PROCEDURE — 80053 COMPREHEN METABOLIC PANEL: CPT

## 2018-12-05 PROCEDURE — 36415 COLL VENOUS BLD VENIPUNCTURE: CPT

## 2018-12-05 PROCEDURE — 86803 HEPATITIS C AB TEST: CPT

## 2018-12-05 PROCEDURE — 85025 COMPLETE CBC W/AUTO DIFF WBC: CPT

## 2018-12-05 PROCEDURE — 86900 BLOOD TYPING SEROLOGIC ABO: CPT

## 2018-12-05 PROCEDURE — 71046 X-RAY EXAM CHEST 2 VIEWS: CPT

## 2018-12-05 PROCEDURE — 86901 BLOOD TYPING SEROLOGIC RH(D): CPT

## 2018-12-05 PROCEDURE — 87081 CULTURE SCREEN ONLY: CPT

## 2018-12-05 PROCEDURE — 81003 URINALYSIS AUTO W/O SCOPE: CPT | Performed by: ORTHOPAEDIC SURGERY

## 2018-12-05 PROCEDURE — 93005 ELECTROCARDIOGRAM TRACING: CPT

## 2018-12-05 PROCEDURE — 85652 RBC SED RATE AUTOMATED: CPT

## 2018-12-05 PROCEDURE — 86850 RBC ANTIBODY SCREEN: CPT

## 2018-12-05 PROCEDURE — 82306 VITAMIN D 25 HYDROXY: CPT

## 2018-12-05 NOTE — ANESTHESIA PREPROCEDURE EVALUATION
Review of Systems/Medical History          Cardiovascular  Hyperlipidemia, Hypertension , DVT (LLE)   Pulmonary  Pneumonia (X3), Sleep apnea CPAP,        GI/Hepatic    GERD well controlled,             Endo/Other  Negative endo/other ROS      GYN       Hematology  Anemia iron deficiency anemia,     Musculoskeletal  Back pain , cervical pain and lumbar pain, Sciatica (right),   Arthritis     Neurology    TIA, Headaches,    Psychology   Anxiety, Depression ,              Physical Exam    Airway    Mallampati score: II  TM Distance: >3 FB  Neck ROM: full     Dental   upper dentures,     Cardiovascular  Rhythm: regular, Rate: normal, Cardiovascular exam normal    Pulmonary  Breath sounds clear to auscultation,     Other Findings        Anesthesia Plan  ASA Score- 3     Anesthesia Type- general with ASA Monitors  Additional Monitors:   Airway Plan: ETT  Comment: Na 127   Needs to be re checked   Plan Factors-Patient not instructed to abstain from smoking on day of procedure  Patient did not smoke on day of surgery  Induction- intravenous  Postoperative Plan- Plan for postoperative opioid use  Informed Consent- Anesthetic plan and risks discussed with patient

## 2018-12-06 LAB
ATRIAL RATE: 98 BPM
HCV AB SER QL: NORMAL
MRSA NOSE QL CULT: NORMAL
P AXIS: 49 DEGREES
PR INTERVAL: 150 MS
QRS AXIS: 21 DEGREES
QRSD INTERVAL: 92 MS
QT INTERVAL: 364 MS
QTC INTERVAL: 464 MS
T WAVE AXIS: 57 DEGREES
VENTRICULAR RATE: 98 BPM

## 2018-12-06 PROCEDURE — 93010 ELECTROCARDIOGRAM REPORT: CPT | Performed by: INTERNAL MEDICINE

## 2018-12-10 ENCOUNTER — TRANSCRIBE ORDERS (OUTPATIENT)
Dept: LAB | Facility: CLINIC | Age: 68
End: 2018-12-10

## 2018-12-17 ENCOUNTER — ANESTHESIA (OUTPATIENT)
Dept: PERIOP | Facility: HOSPITAL | Age: 68
End: 2018-12-17

## 2019-04-26 ENCOUNTER — ANESTHESIA EVENT (OUTPATIENT)
Dept: PERIOP | Facility: HOSPITAL | Age: 69
DRG: 458 | End: 2019-04-26
Payer: MEDICARE

## 2019-04-26 RX ORDER — SODIUM CHLORIDE 9 MG/ML
125 INJECTION, SOLUTION INTRAVENOUS CONTINUOUS
Status: CANCELLED | OUTPATIENT
Start: 2019-05-16

## 2019-04-29 ENCOUNTER — APPOINTMENT (OUTPATIENT)
Dept: PREADMISSION TESTING | Facility: HOSPITAL | Age: 69
End: 2019-04-29
Payer: MEDICARE

## 2019-04-29 ENCOUNTER — TRANSCRIBE ORDERS (OUTPATIENT)
Dept: ADMINISTRATIVE | Facility: HOSPITAL | Age: 69
End: 2019-04-29

## 2019-04-29 ENCOUNTER — APPOINTMENT (OUTPATIENT)
Dept: LAB | Facility: HOSPITAL | Age: 69
End: 2019-04-29
Attending: ORTHOPAEDIC SURGERY
Payer: MEDICARE

## 2019-04-29 DIAGNOSIS — M48.062 SPINAL STENOSIS, LUMBAR REGION, WITH NEUROGENIC CLAUDICATION: ICD-10-CM

## 2019-04-29 DIAGNOSIS — M48.062 SPINAL STENOSIS, LUMBAR REGION, WITH NEUROGENIC CLAUDICATION: Primary | ICD-10-CM

## 2019-04-29 LAB
25(OH)D3 SERPL-MCNC: 18.8 NG/ML (ref 30–100)
ALBUMIN SERPL BCP-MCNC: 3.8 G/DL (ref 3.5–5)
ALP SERPL-CCNC: 64 U/L (ref 46–116)
ALT SERPL W P-5'-P-CCNC: 37 U/L (ref 12–78)
ANION GAP SERPL CALCULATED.3IONS-SCNC: 7 MMOL/L (ref 4–13)
AST SERPL W P-5'-P-CCNC: 20 U/L (ref 5–45)
BASOPHILS # BLD AUTO: 0.02 THOUSANDS/ΜL (ref 0–0.1)
BASOPHILS NFR BLD AUTO: 0 % (ref 0–1)
BILIRUB SERPL-MCNC: 0.27 MG/DL (ref 0.2–1)
BILIRUB UR QL STRIP: NEGATIVE
BUN SERPL-MCNC: 15 MG/DL (ref 5–25)
CALCIUM SERPL-MCNC: 9.7 MG/DL (ref 8.3–10.1)
CHLORIDE SERPL-SCNC: 103 MMOL/L (ref 100–108)
CLARITY UR: CLEAR
CO2 SERPL-SCNC: 29 MMOL/L (ref 21–32)
COLOR UR: YELLOW
CREAT SERPL-MCNC: 1.07 MG/DL (ref 0.6–1.3)
EOSINOPHIL # BLD AUTO: 0.11 THOUSAND/ΜL (ref 0–0.61)
EOSINOPHIL NFR BLD AUTO: 2 % (ref 0–6)
ERYTHROCYTE [DISTWIDTH] IN BLOOD BY AUTOMATED COUNT: 11.9 % (ref 11.6–15.1)
ERYTHROCYTE [SEDIMENTATION RATE] IN BLOOD: 6 MM/HOUR (ref 0–10)
GFR SERPL CREATININE-BSD FRML MDRD: 71 ML/MIN/1.73SQ M
GLUCOSE SERPL-MCNC: 132 MG/DL (ref 65–140)
GLUCOSE UR STRIP-MCNC: NEGATIVE MG/DL
HCT VFR BLD AUTO: 43.3 % (ref 36.5–49.3)
HCV AB SER QL: NORMAL
HGB BLD-MCNC: 14.1 G/DL (ref 12–17)
HGB UR QL STRIP.AUTO: NEGATIVE
IMM GRANULOCYTES # BLD AUTO: 0.04 THOUSAND/UL (ref 0–0.2)
IMM GRANULOCYTES NFR BLD AUTO: 1 % (ref 0–2)
KETONES UR STRIP-MCNC: NEGATIVE MG/DL
LEUKOCYTE ESTERASE UR QL STRIP: NEGATIVE
LYMPHOCYTES # BLD AUTO: 1.53 THOUSANDS/ΜL (ref 0.6–4.47)
LYMPHOCYTES NFR BLD AUTO: 22 % (ref 14–44)
MCH RBC QN AUTO: 33.7 PG (ref 26.8–34.3)
MCHC RBC AUTO-ENTMCNC: 32.6 G/DL (ref 31.4–37.4)
MCV RBC AUTO: 103 FL (ref 82–98)
MONOCYTES # BLD AUTO: 0.62 THOUSAND/ΜL (ref 0.17–1.22)
MONOCYTES NFR BLD AUTO: 9 % (ref 4–12)
NEUTROPHILS # BLD AUTO: 4.55 THOUSANDS/ΜL (ref 1.85–7.62)
NEUTS SEG NFR BLD AUTO: 66 % (ref 43–75)
NITRITE UR QL STRIP: NEGATIVE
NRBC BLD AUTO-RTO: 0 /100 WBCS
PH UR STRIP.AUTO: 6 [PH]
PLATELET # BLD AUTO: 268 THOUSANDS/UL (ref 149–390)
PMV BLD AUTO: 10.2 FL (ref 8.9–12.7)
POTASSIUM SERPL-SCNC: 4.4 MMOL/L (ref 3.5–5.3)
PROT SERPL-MCNC: 7.3 G/DL (ref 6.4–8.2)
PROT UR STRIP-MCNC: NEGATIVE MG/DL
RBC # BLD AUTO: 4.19 MILLION/UL (ref 3.88–5.62)
SODIUM SERPL-SCNC: 139 MMOL/L (ref 136–145)
SP GR UR STRIP.AUTO: 1.02 (ref 1–1.03)
UROBILINOGEN UR QL STRIP.AUTO: 0.2 E.U./DL
WBC # BLD AUTO: 6.87 THOUSAND/UL (ref 4.31–10.16)

## 2019-04-29 PROCEDURE — 81003 URINALYSIS AUTO W/O SCOPE: CPT | Performed by: ORTHOPAEDIC SURGERY

## 2019-04-29 PROCEDURE — 36415 COLL VENOUS BLD VENIPUNCTURE: CPT

## 2019-04-29 PROCEDURE — 80053 COMPREHEN METABOLIC PANEL: CPT

## 2019-04-29 PROCEDURE — 82306 VITAMIN D 25 HYDROXY: CPT

## 2019-04-29 PROCEDURE — 86803 HEPATITIS C AB TEST: CPT

## 2019-04-29 PROCEDURE — 85025 COMPLETE CBC W/AUTO DIFF WBC: CPT

## 2019-04-29 PROCEDURE — 87081 CULTURE SCREEN ONLY: CPT

## 2019-04-29 PROCEDURE — 85652 RBC SED RATE AUTOMATED: CPT

## 2019-04-29 RX ORDER — RANITIDINE 150 MG/1
150 CAPSULE ORAL EVERY EVENING
COMMUNITY

## 2019-04-30 LAB — MRSA NOSE QL CULT: NORMAL

## 2019-05-15 RX ORDER — SODIUM CHLORIDE, SODIUM LACTATE, POTASSIUM CHLORIDE, CALCIUM CHLORIDE 600; 310; 30; 20 MG/100ML; MG/100ML; MG/100ML; MG/100ML
100 INJECTION, SOLUTION INTRAVENOUS ONCE
Status: CANCELLED | OUTPATIENT
Start: 2019-05-16 | End: 2019-05-16

## 2019-05-16 ENCOUNTER — APPOINTMENT (OUTPATIENT)
Dept: RADIOLOGY | Facility: HOSPITAL | Age: 69
DRG: 458 | End: 2019-05-16
Payer: MEDICARE

## 2019-05-16 ENCOUNTER — ANESTHESIA (OUTPATIENT)
Dept: PERIOP | Facility: HOSPITAL | Age: 69
DRG: 458 | End: 2019-05-16
Payer: MEDICARE

## 2019-05-16 ENCOUNTER — HOSPITAL ENCOUNTER (INPATIENT)
Facility: HOSPITAL | Age: 69
LOS: 4 days | Discharge: HOME | DRG: 458 | End: 2019-05-20
Attending: ORTHOPAEDIC SURGERY | Admitting: ORTHOPAEDIC SURGERY
Payer: MEDICARE

## 2019-05-16 DIAGNOSIS — M48.062 SPINAL STENOSIS OF LUMBAR REGION WITH NEUROGENIC CLAUDICATION: Primary | ICD-10-CM

## 2019-05-16 PROBLEM — K21.9 GASTROESOPHAGEAL REFLUX DISEASE: Status: ACTIVE | Noted: 2018-12-11

## 2019-05-16 PROBLEM — Z99.89 OSA ON CPAP: Status: ACTIVE | Noted: 2019-04-04

## 2019-05-16 PROBLEM — G47.33 OSA ON CPAP: Status: ACTIVE | Noted: 2019-04-04

## 2019-05-16 PROBLEM — E78.2 MIXED HYPERLIPIDEMIA: Status: ACTIVE | Noted: 2019-03-25

## 2019-05-16 PROBLEM — M54.17 LUMBOSACRAL RADICULITIS: Status: ACTIVE | Noted: 2018-09-11

## 2019-05-16 LAB
ABO GROUP BLD: NORMAL
BLD GP AB SCN SERPL QL: NEGATIVE
RH BLD: POSITIVE
SPECIMEN EXPIRATION DATE: NORMAL

## 2019-05-16 PROCEDURE — C1713 ANCHOR/SCREW BN/BN,TIS/BN: HCPCS | Performed by: ORTHOPAEDIC SURGERY

## 2019-05-16 PROCEDURE — 0SG1070 FUSION OF 2 OR MORE LUMBAR VERTEBRAL JOINTS WITH AUTOLOGOUS TISSUE SUBSTITUTE, ANTERIOR APPROACH, ANTERIOR COLUMN, OPEN APPROACH: ICD-10-PCS | Performed by: ORTHOPAEDIC SURGERY

## 2019-05-16 PROCEDURE — 86901 BLOOD TYPING SEROLOGIC RH(D): CPT | Performed by: ORTHOPAEDIC SURGERY

## 2019-05-16 PROCEDURE — 72100 X-RAY EXAM L-S SPINE 2/3 VWS: CPT

## 2019-05-16 PROCEDURE — 86850 RBC ANTIBODY SCREEN: CPT | Performed by: ORTHOPAEDIC SURGERY

## 2019-05-16 PROCEDURE — 0SG307J FUSION OF LUMBOSACRAL JOINT WITH AUTOLOGOUS TISSUE SUBSTITUTE, POSTERIOR APPROACH, ANTERIOR COLUMN, OPEN APPROACH: ICD-10-PCS | Performed by: ORTHOPAEDIC SURGERY

## 2019-05-16 PROCEDURE — 86900 BLOOD TYPING SEROLOGIC ABO: CPT | Performed by: ORTHOPAEDIC SURGERY

## 2019-05-16 PROCEDURE — 0ST40ZZ RESECTION OF LUMBOSACRAL DISC, OPEN APPROACH: ICD-10-PCS | Performed by: ORTHOPAEDIC SURGERY

## 2019-05-16 PROCEDURE — C1769 GUIDE WIRE: HCPCS | Performed by: ORTHOPAEDIC SURGERY

## 2019-05-16 PROCEDURE — 0ST20ZZ RESECTION OF LUMBAR VERTEBRAL DISC, OPEN APPROACH: ICD-10-PCS | Performed by: ORTHOPAEDIC SURGERY

## 2019-05-16 DEVICE — BONE GRAFT KIT 7510400 INFUSE MEDIUM
Type: IMPLANTABLE DEVICE | Site: SPINE LUMBAR | Status: FUNCTIONAL
Brand: INFUSE® BONE GRAFT

## 2019-05-16 DEVICE — IMPLANTABLE DEVICE
Type: IMPLANTABLE DEVICE | Site: SPINE LUMBAR | Status: FUNCTIONAL
Brand: TIMBERLINE® LATERAL INTERBODY FUSION SYSTEM

## 2019-05-16 DEVICE — IMPLANTABLE DEVICE: Type: IMPLANTABLE DEVICE | Site: SPINE LUMBAR | Status: FUNCTIONAL

## 2019-05-16 DEVICE — GRAFT BONE PUTTY 10ML: Type: IMPLANTABLE DEVICE | Site: SPINE LUMBAR | Status: FUNCTIONAL

## 2019-05-16 RX ORDER — SENNOSIDES 8.6 MG
1 TABLET ORAL DAILY
Status: DISCONTINUED | OUTPATIENT
Start: 2019-05-17 | End: 2019-05-20 | Stop reason: HOSPADM

## 2019-05-16 RX ORDER — PROPOFOL 10 MG/ML
INJECTION, EMULSION INTRAVENOUS CONTINUOUS PRN
Status: DISCONTINUED | OUTPATIENT
Start: 2019-05-16 | End: 2019-05-16 | Stop reason: SURG

## 2019-05-16 RX ORDER — HYDROMORPHONE HCL/PF 1 MG/ML
0.5 SYRINGE (ML) INJECTION
Status: DISCONTINUED | OUTPATIENT
Start: 2019-05-16 | End: 2019-05-16 | Stop reason: HOSPADM

## 2019-05-16 RX ORDER — SODIUM CHLORIDE, SODIUM LACTATE, POTASSIUM CHLORIDE, CALCIUM CHLORIDE 600; 310; 30; 20 MG/100ML; MG/100ML; MG/100ML; MG/100ML
125 INJECTION, SOLUTION INTRAVENOUS CONTINUOUS
Status: DISCONTINUED | OUTPATIENT
Start: 2019-05-16 | End: 2019-05-20 | Stop reason: HOSPADM

## 2019-05-16 RX ORDER — SODIUM CHLORIDE 9 MG/ML
125 INJECTION, SOLUTION INTRAVENOUS CONTINUOUS
Status: DISCONTINUED | OUTPATIENT
Start: 2019-05-16 | End: 2019-05-16

## 2019-05-16 RX ORDER — DIPHENHYDRAMINE HYDROCHLORIDE 50 MG/ML
25 INJECTION INTRAMUSCULAR; INTRAVENOUS EVERY 6 HOURS PRN
Status: DISCONTINUED | OUTPATIENT
Start: 2019-05-16 | End: 2019-05-17

## 2019-05-16 RX ORDER — ACETAMINOPHEN 325 MG/1
975 TABLET ORAL ONCE
Status: COMPLETED | OUTPATIENT
Start: 2019-05-16 | End: 2019-05-16

## 2019-05-16 RX ORDER — HYDROMORPHONE HCL/PF 1 MG/ML
SYRINGE (ML) INJECTION AS NEEDED
Status: DISCONTINUED | OUTPATIENT
Start: 2019-05-16 | End: 2019-05-16 | Stop reason: SURG

## 2019-05-16 RX ORDER — CEFAZOLIN SODIUM 2 G/50ML
2000 SOLUTION INTRAVENOUS ONCE
Status: DISCONTINUED | OUTPATIENT
Start: 2019-05-16 | End: 2019-05-16 | Stop reason: HOSPADM

## 2019-05-16 RX ORDER — MIDAZOLAM HYDROCHLORIDE 1 MG/ML
INJECTION INTRAMUSCULAR; INTRAVENOUS AS NEEDED
Status: DISCONTINUED | OUTPATIENT
Start: 2019-05-16 | End: 2019-05-16 | Stop reason: SURG

## 2019-05-16 RX ORDER — GABAPENTIN 300 MG/1
300 CAPSULE ORAL
Status: DISCONTINUED | OUTPATIENT
Start: 2019-05-16 | End: 2019-05-20 | Stop reason: HOSPADM

## 2019-05-16 RX ORDER — CEFAZOLIN SODIUM 1 G/50ML
1000 SOLUTION INTRAVENOUS EVERY 8 HOURS
Status: COMPLETED | OUTPATIENT
Start: 2019-05-16 | End: 2019-05-17

## 2019-05-16 RX ORDER — METHOCARBAMOL 750 MG/1
750 TABLET, FILM COATED ORAL 4 TIMES DAILY PRN
Status: DISCONTINUED | OUTPATIENT
Start: 2019-05-16 | End: 2019-05-20 | Stop reason: HOSPADM

## 2019-05-16 RX ORDER — ATORVASTATIN CALCIUM 40 MG/1
40 TABLET, FILM COATED ORAL
Status: DISCONTINUED | OUTPATIENT
Start: 2019-05-16 | End: 2019-05-20 | Stop reason: HOSPADM

## 2019-05-16 RX ORDER — TIZANIDINE 4 MG/1
4 TABLET ORAL EVERY 8 HOURS PRN
Qty: 60 TABLET | Refills: 0 | Status: SHIPPED | OUTPATIENT
Start: 2019-05-16

## 2019-05-16 RX ORDER — BUPIVACAINE HYDROCHLORIDE AND EPINEPHRINE 5; 5 MG/ML; UG/ML
INJECTION, SOLUTION EPIDURAL; INTRACAUDAL; PERINEURAL AS NEEDED
Status: DISCONTINUED | OUTPATIENT
Start: 2019-05-16 | End: 2019-05-16 | Stop reason: HOSPADM

## 2019-05-16 RX ORDER — PROPOFOL 10 MG/ML
INJECTION, EMULSION INTRAVENOUS AS NEEDED
Status: DISCONTINUED | OUTPATIENT
Start: 2019-05-16 | End: 2019-05-16 | Stop reason: SURG

## 2019-05-16 RX ORDER — MAGNESIUM HYDROXIDE/ALUMINUM HYDROXICE/SIMETHICONE 120; 1200; 1200 MG/30ML; MG/30ML; MG/30ML
30 SUSPENSION ORAL EVERY 6 HOURS PRN
Status: DISCONTINUED | OUTPATIENT
Start: 2019-05-16 | End: 2019-05-20 | Stop reason: HOSPADM

## 2019-05-16 RX ORDER — FAMOTIDINE 20 MG/1
20 TABLET, FILM COATED ORAL DAILY
Status: DISCONTINUED | OUTPATIENT
Start: 2019-05-17 | End: 2019-05-20 | Stop reason: HOSPADM

## 2019-05-16 RX ORDER — KETAMINE HYDROCHLORIDE 50 MG/ML
INJECTION, SOLUTION, CONCENTRATE INTRAMUSCULAR; INTRAVENOUS AS NEEDED
Status: DISCONTINUED | OUTPATIENT
Start: 2019-05-16 | End: 2019-05-16 | Stop reason: SURG

## 2019-05-16 RX ORDER — PRAMIPEXOLE DIHYDROCHLORIDE 0.5 MG/1
0.5 TABLET ORAL
Status: DISCONTINUED | OUTPATIENT
Start: 2019-05-16 | End: 2019-05-20 | Stop reason: HOSPADM

## 2019-05-16 RX ORDER — LORAZEPAM 2 MG/ML
0.5 INJECTION INTRAMUSCULAR
Status: COMPLETED | OUTPATIENT
Start: 2019-05-16 | End: 2019-05-16

## 2019-05-16 RX ORDER — HYDROCODONE BITARTRATE AND ACETAMINOPHEN 5; 325 MG/1; MG/1
1 TABLET ORAL EVERY 6 HOURS PRN
Qty: 80 TABLET | Refills: 0 | Status: SHIPPED | OUTPATIENT
Start: 2019-05-16 | End: 2019-05-20 | Stop reason: HOSPADM

## 2019-05-16 RX ORDER — DEXAMETHASONE SODIUM PHOSPHATE 4 MG/ML
INJECTION, SOLUTION INTRA-ARTICULAR; INTRALESIONAL; INTRAMUSCULAR; INTRAVENOUS; SOFT TISSUE AS NEEDED
Status: DISCONTINUED | OUTPATIENT
Start: 2019-05-16 | End: 2019-05-16 | Stop reason: SURG

## 2019-05-16 RX ORDER — ONDANSETRON 2 MG/ML
4 INJECTION INTRAMUSCULAR; INTRAVENOUS EVERY 6 HOURS PRN
Status: DISCONTINUED | OUTPATIENT
Start: 2019-05-16 | End: 2019-05-20 | Stop reason: HOSPADM

## 2019-05-16 RX ORDER — CELECOXIB 200 MG/1
200 CAPSULE ORAL ONCE
Status: COMPLETED | OUTPATIENT
Start: 2019-05-16 | End: 2019-05-16

## 2019-05-16 RX ORDER — HYDROCODONE BITARTRATE AND ACETAMINOPHEN 5; 325 MG/1; MG/1
2 TABLET ORAL EVERY 4 HOURS PRN
Status: DISCONTINUED | OUTPATIENT
Start: 2019-05-17 | End: 2019-05-17

## 2019-05-16 RX ORDER — EPHEDRINE SULFATE 50 MG/ML
INJECTION INTRAVENOUS AS NEEDED
Status: DISCONTINUED | OUTPATIENT
Start: 2019-05-16 | End: 2019-05-16 | Stop reason: SURG

## 2019-05-16 RX ORDER — LANOLIN ALCOHOL/MO/W.PET/CERES
9 CREAM (GRAM) TOPICAL
Status: DISCONTINUED | OUTPATIENT
Start: 2019-05-16 | End: 2019-05-20 | Stop reason: HOSPADM

## 2019-05-16 RX ORDER — ONDANSETRON 2 MG/ML
INJECTION INTRAMUSCULAR; INTRAVENOUS AS NEEDED
Status: DISCONTINUED | OUTPATIENT
Start: 2019-05-16 | End: 2019-05-16 | Stop reason: SURG

## 2019-05-16 RX ORDER — SIMETHICONE 80 MG
80 TABLET,CHEWABLE ORAL 4 TIMES DAILY PRN
Status: DISCONTINUED | OUTPATIENT
Start: 2019-05-16 | End: 2019-05-20 | Stop reason: HOSPADM

## 2019-05-16 RX ORDER — LISINOPRIL 20 MG/1
20 TABLET ORAL
Status: DISCONTINUED | OUTPATIENT
Start: 2019-05-16 | End: 2019-05-17

## 2019-05-16 RX ORDER — FENTANYL CITRATE/PF 50 MCG/ML
50 SYRINGE (ML) INJECTION
Status: DISCONTINUED | OUTPATIENT
Start: 2019-05-16 | End: 2019-05-16 | Stop reason: HOSPADM

## 2019-05-16 RX ORDER — ORPHENADRINE CITRATE 30 MG/ML
60 INJECTION INTRAMUSCULAR; INTRAVENOUS ONCE
Status: COMPLETED | OUTPATIENT
Start: 2019-05-16 | End: 2019-05-16

## 2019-05-16 RX ORDER — FENTANYL CITRATE 50 UG/ML
INJECTION, SOLUTION INTRAMUSCULAR; INTRAVENOUS AS NEEDED
Status: DISCONTINUED | OUTPATIENT
Start: 2019-05-16 | End: 2019-05-16 | Stop reason: SURG

## 2019-05-16 RX ORDER — CALCIUM CARBONATE 200(500)MG
1000 TABLET,CHEWABLE ORAL DAILY PRN
Status: DISCONTINUED | OUTPATIENT
Start: 2019-05-16 | End: 2019-05-20 | Stop reason: HOSPADM

## 2019-05-16 RX ORDER — SODIUM CHLORIDE 9 MG/ML
INJECTION, SOLUTION INTRAVENOUS CONTINUOUS PRN
Status: DISCONTINUED | OUTPATIENT
Start: 2019-05-16 | End: 2019-05-16 | Stop reason: SURG

## 2019-05-16 RX ORDER — HYDROMORPHONE HCL/PF 1 MG/ML
0.5 SYRINGE (ML) INJECTION
Status: COMPLETED | OUTPATIENT
Start: 2019-05-16 | End: 2019-05-16

## 2019-05-16 RX ORDER — DOCUSATE SODIUM 100 MG/1
100 CAPSULE, LIQUID FILLED ORAL 2 TIMES DAILY
Status: DISCONTINUED | OUTPATIENT
Start: 2019-05-16 | End: 2019-05-20 | Stop reason: HOSPADM

## 2019-05-16 RX ORDER — SUCCINYLCHOLINE/SOD CL,ISO/PF 100 MG/5ML
SYRINGE (ML) INTRAVENOUS AS NEEDED
Status: DISCONTINUED | OUTPATIENT
Start: 2019-05-16 | End: 2019-05-16 | Stop reason: SURG

## 2019-05-16 RX ORDER — ONDANSETRON 2 MG/ML
4 INJECTION INTRAMUSCULAR; INTRAVENOUS ONCE AS NEEDED
Status: DISCONTINUED | OUTPATIENT
Start: 2019-05-16 | End: 2019-05-16 | Stop reason: HOSPADM

## 2019-05-16 RX ORDER — MEPERIDINE HYDROCHLORIDE 50 MG/ML
12.5 INJECTION INTRAMUSCULAR; INTRAVENOUS; SUBCUTANEOUS ONCE AS NEEDED
Status: DISCONTINUED | OUTPATIENT
Start: 2019-05-16 | End: 2019-05-16 | Stop reason: HOSPADM

## 2019-05-16 RX ORDER — VANCOMYCIN HYDROCHLORIDE 1 G/20ML
INJECTION, POWDER, LYOPHILIZED, FOR SOLUTION INTRAVENOUS AS NEEDED
Status: DISCONTINUED | OUTPATIENT
Start: 2019-05-16 | End: 2019-05-16 | Stop reason: HOSPADM

## 2019-05-16 RX ADMIN — FENTANYL CITRATE 50 MCG: 50 INJECTION, SOLUTION INTRAMUSCULAR; INTRAVENOUS at 17:15

## 2019-05-16 RX ADMIN — HYDROMORPHONE HYDROCHLORIDE 0.5 MG: 1 INJECTION, SOLUTION INTRAMUSCULAR; INTRAVENOUS; SUBCUTANEOUS at 18:04

## 2019-05-16 RX ADMIN — SODIUM CHLORIDE: 0.9 INJECTION, SOLUTION INTRAVENOUS at 09:06

## 2019-05-16 RX ADMIN — Medication: at 19:01

## 2019-05-16 RX ADMIN — METHOCARBAMOL 750 MG: 750 TABLET, FILM COATED ORAL at 21:09

## 2019-05-16 RX ADMIN — SODIUM CHLORIDE: 0.9 INJECTION, SOLUTION INTRAVENOUS at 08:57

## 2019-05-16 RX ADMIN — PHENYLEPHRINE HYDROCHLORIDE 100 MCG: 10 INJECTION INTRAVENOUS at 09:01

## 2019-05-16 RX ADMIN — ONDANSETRON HYDROCHLORIDE 4 MG: 2 INJECTION, SOLUTION INTRAVENOUS at 08:40

## 2019-05-16 RX ADMIN — PROPOFOL 100 MCG/KG/MIN: 10 INJECTION, EMULSION INTRAVENOUS at 08:57

## 2019-05-16 RX ADMIN — GABAPENTIN 300 MG: 300 CAPSULE ORAL at 21:09

## 2019-05-16 RX ADMIN — SODIUM CHLORIDE: 0.9 INJECTION, SOLUTION INTRAVENOUS at 11:47

## 2019-05-16 RX ADMIN — LORAZEPAM 0.5 MG: 2 INJECTION INTRAMUSCULAR; INTRAVENOUS at 19:32

## 2019-05-16 RX ADMIN — VANCOMYCIN HYDROCHLORIDE 1479 MG: 5 INJECTION, POWDER, LYOPHILIZED, FOR SOLUTION INTRAVENOUS at 09:08

## 2019-05-16 RX ADMIN — PHENYLEPHRINE HYDROCHLORIDE 50 MCG: 10 INJECTION INTRAVENOUS at 14:56

## 2019-05-16 RX ADMIN — KETAMINE HYDROCHLORIDE 25 MG: 50 INJECTION INTRAMUSCULAR; INTRAVENOUS at 08:57

## 2019-05-16 RX ADMIN — LORAZEPAM 0.5 MG: 2 INJECTION INTRAMUSCULAR; INTRAVENOUS at 18:30

## 2019-05-16 RX ADMIN — HYDROMORPHONE HYDROCHLORIDE 0.5 MG: 1 INJECTION, SOLUTION INTRAMUSCULAR; INTRAVENOUS; SUBCUTANEOUS at 10:29

## 2019-05-16 RX ADMIN — SODIUM CHLORIDE: 0.9 INJECTION, SOLUTION INTRAVENOUS at 14:15

## 2019-05-16 RX ADMIN — DEXAMETHASONE SODIUM PHOSPHATE 4 MG: 4 INJECTION, SOLUTION INTRAMUSCULAR; INTRAVENOUS at 09:10

## 2019-05-16 RX ADMIN — PHENYLEPHRINE HYDROCHLORIDE 50 MCG: 10 INJECTION INTRAVENOUS at 14:28

## 2019-05-16 RX ADMIN — PHENYLEPHRINE HYDROCHLORIDE 50 MCG: 10 INJECTION INTRAVENOUS at 09:03

## 2019-05-16 RX ADMIN — REMIFENTANIL HYDROCHLORIDE 0.12 MCG/KG/MIN: 1 INJECTION, POWDER, LYOPHILIZED, FOR SOLUTION INTRAVENOUS at 08:57

## 2019-05-16 RX ADMIN — HYDROMORPHONE HYDROCHLORIDE 0.5 MG: 1 INJECTION, SOLUTION INTRAMUSCULAR; INTRAVENOUS; SUBCUTANEOUS at 17:52

## 2019-05-16 RX ADMIN — HYDROMORPHONE HYDROCHLORIDE 0.5 MG: 1 INJECTION, SOLUTION INTRAMUSCULAR; INTRAVENOUS; SUBCUTANEOUS at 18:12

## 2019-05-16 RX ADMIN — KETAMINE HYDROCHLORIDE 15 MG: 50 INJECTION INTRAMUSCULAR; INTRAVENOUS at 10:53

## 2019-05-16 RX ADMIN — HYDROMORPHONE HYDROCHLORIDE 0.5 MG: 1 INJECTION, SOLUTION INTRAMUSCULAR; INTRAVENOUS; SUBCUTANEOUS at 11:27

## 2019-05-16 RX ADMIN — KETAMINE HYDROCHLORIDE 10 MG: 50 INJECTION INTRAMUSCULAR; INTRAVENOUS at 12:40

## 2019-05-16 RX ADMIN — CEFAZOLIN SODIUM 1000 MG: 1 SOLUTION INTRAVENOUS at 21:12

## 2019-05-16 RX ADMIN — PHENYLEPHRINE HYDROCHLORIDE 50 MCG: 10 INJECTION INTRAVENOUS at 13:15

## 2019-05-16 RX ADMIN — FENTANYL CITRATE 100 MCG: 50 INJECTION, SOLUTION INTRAMUSCULAR; INTRAVENOUS at 08:57

## 2019-05-16 RX ADMIN — FENTANYL CITRATE 50 MCG: 50 INJECTION, SOLUTION INTRAMUSCULAR; INTRAVENOUS at 15:38

## 2019-05-16 RX ADMIN — HYDROMORPHONE HYDROCHLORIDE 0.5 MG: 1 INJECTION, SOLUTION INTRAMUSCULAR; INTRAVENOUS; SUBCUTANEOUS at 15:00

## 2019-05-16 RX ADMIN — PROPOFOL 40 MG: 10 INJECTION, EMULSION INTRAVENOUS at 17:02

## 2019-05-16 RX ADMIN — PHENYLEPHRINE HYDROCHLORIDE 50 MCG: 10 INJECTION INTRAVENOUS at 10:37

## 2019-05-16 RX ADMIN — HYDROMORPHONE HYDROCHLORIDE 0.5 MG: 1 INJECTION, SOLUTION INTRAMUSCULAR; INTRAVENOUS; SUBCUTANEOUS at 18:50

## 2019-05-16 RX ADMIN — FENTANYL CITRATE 50 MCG: 50 INJECTION, SOLUTION INTRAMUSCULAR; INTRAVENOUS at 13:09

## 2019-05-16 RX ADMIN — ORPHENADRINE CITRATE 60 MG: 30 INJECTION INTRAMUSCULAR; INTRAVENOUS at 14:39

## 2019-05-16 RX ADMIN — SODIUM CHLORIDE 125 ML/HR: 0.9 INJECTION, SOLUTION INTRAVENOUS at 06:53

## 2019-05-16 RX ADMIN — SODIUM CHLORIDE, SODIUM LACTATE, POTASSIUM CHLORIDE, AND CALCIUM CHLORIDE 125 ML/HR: .6; .31; .03; .02 INJECTION, SOLUTION INTRAVENOUS at 21:13

## 2019-05-16 RX ADMIN — FENTANYL CITRATE 50 MCG: 50 INJECTION, SOLUTION INTRAMUSCULAR; INTRAVENOUS at 17:39

## 2019-05-16 RX ADMIN — CELECOXIB 200 MG: 200 CAPSULE ORAL at 06:37

## 2019-05-16 RX ADMIN — LIDOCAINE HYDROCHLORIDE 60 MG: 20 INJECTION, SOLUTION INTRAVENOUS at 08:57

## 2019-05-16 RX ADMIN — REMIFENTANIL HYDROCHLORIDE 0.15 MCG/KG/MIN: 1 INJECTION, POWDER, LYOPHILIZED, FOR SOLUTION INTRAVENOUS at 10:20

## 2019-05-16 RX ADMIN — FENTANYL CITRATE 50 MCG: 50 INJECTION, SOLUTION INTRAMUSCULAR; INTRAVENOUS at 10:15

## 2019-05-16 RX ADMIN — MELATONIN TAB 3 MG 9 MG: 3 TAB at 23:23

## 2019-05-16 RX ADMIN — ATORVASTATIN CALCIUM 40 MG: 40 TABLET, FILM COATED ORAL at 21:09

## 2019-05-16 RX ADMIN — ACETAMINOPHEN 975 MG: 325 TABLET ORAL at 06:36

## 2019-05-16 RX ADMIN — FENTANYL CITRATE 50 MCG: 50 INJECTION, SOLUTION INTRAMUSCULAR; INTRAVENOUS at 11:19

## 2019-05-16 RX ADMIN — HYDROMORPHONE HYDROCHLORIDE 0.5 MG: 1 INJECTION, SOLUTION INTRAMUSCULAR; INTRAVENOUS; SUBCUTANEOUS at 18:33

## 2019-05-16 RX ADMIN — PHENYLEPHRINE HYDROCHLORIDE 50 MCG: 10 INJECTION INTRAVENOUS at 13:37

## 2019-05-16 RX ADMIN — Medication 100 MG: at 08:57

## 2019-05-16 RX ADMIN — FENTANYL CITRATE 50 MCG: 50 INJECTION, SOLUTION INTRAMUSCULAR; INTRAVENOUS at 16:34

## 2019-05-16 RX ADMIN — MIDAZOLAM 3 MG: 1 INJECTION INTRAMUSCULAR; INTRAVENOUS at 08:43

## 2019-05-16 RX ADMIN — HYDROMORPHONE HYDROCHLORIDE 0.5 MG: 1 INJECTION, SOLUTION INTRAMUSCULAR; INTRAVENOUS; SUBCUTANEOUS at 18:19

## 2019-05-16 RX ADMIN — EPHEDRINE SULFATE 10 MG: 50 INJECTION, SOLUTION INTRAVENOUS at 09:12

## 2019-05-16 RX ADMIN — ONDANSETRON HYDROCHLORIDE 4 MG: 2 INJECTION, SOLUTION INTRAVENOUS at 12:40

## 2019-05-16 RX ADMIN — HYDROMORPHONE HYDROCHLORIDE 0.5 MG: 1 INJECTION, SOLUTION INTRAMUSCULAR; INTRAVENOUS; SUBCUTANEOUS at 13:34

## 2019-05-16 RX ADMIN — MIDAZOLAM 1 MG: 1 INJECTION INTRAMUSCULAR; INTRAVENOUS at 08:57

## 2019-05-16 RX ADMIN — FENTANYL CITRATE 50 MCG: 50 INJECTION, SOLUTION INTRAMUSCULAR; INTRAVENOUS at 17:45

## 2019-05-16 RX ADMIN — LISINOPRIL 20 MG: 20 TABLET ORAL at 21:09

## 2019-05-16 RX ADMIN — PRAMIPEXOLE DIHYDROCHLORIDE 0.5 MG: 0.5 TABLET ORAL at 23:23

## 2019-05-16 RX ADMIN — PROPOFOL 200 MG: 10 INJECTION, EMULSION INTRAVENOUS at 08:57

## 2019-05-17 ENCOUNTER — APPOINTMENT (INPATIENT)
Dept: RADIOLOGY | Facility: HOSPITAL | Age: 69
DRG: 458 | End: 2019-05-17
Payer: MEDICARE

## 2019-05-17 ENCOUNTER — APPOINTMENT (INPATIENT)
Dept: CT IMAGING | Facility: HOSPITAL | Age: 69
DRG: 458 | End: 2019-05-17
Payer: MEDICARE

## 2019-05-17 LAB
ANION GAP SERPL CALCULATED.3IONS-SCNC: 10 MMOL/L (ref 4–13)
BUN SERPL-MCNC: 10 MG/DL (ref 5–25)
CALCIUM SERPL-MCNC: 8.4 MG/DL (ref 8.3–10.1)
CHLORIDE SERPL-SCNC: 104 MMOL/L (ref 100–108)
CO2 SERPL-SCNC: 24 MMOL/L (ref 21–32)
CREAT SERPL-MCNC: 0.88 MG/DL (ref 0.6–1.3)
ERYTHROCYTE [DISTWIDTH] IN BLOOD BY AUTOMATED COUNT: 11.9 % (ref 11.6–15.1)
GFR SERPL CREATININE-BSD FRML MDRD: 88 ML/MIN/1.73SQ M
GLUCOSE SERPL-MCNC: 138 MG/DL (ref 65–140)
HCT VFR BLD AUTO: 39.1 % (ref 36.5–49.3)
HGB BLD-MCNC: 12.8 G/DL (ref 12–17)
MCH RBC QN AUTO: 33.3 PG (ref 26.8–34.3)
MCHC RBC AUTO-ENTMCNC: 32.7 G/DL (ref 31.4–37.4)
MCV RBC AUTO: 102 FL (ref 82–98)
PLATELET # BLD AUTO: 215 THOUSANDS/UL (ref 149–390)
PMV BLD AUTO: 9.9 FL (ref 8.9–12.7)
POTASSIUM SERPL-SCNC: 3.8 MMOL/L (ref 3.5–5.3)
RBC # BLD AUTO: 3.84 MILLION/UL (ref 3.88–5.62)
SODIUM SERPL-SCNC: 138 MMOL/L (ref 136–145)
WBC # BLD AUTO: 8.79 THOUSAND/UL (ref 4.31–10.16)

## 2019-05-17 PROCEDURE — 97166 OT EVAL MOD COMPLEX 45 MIN: CPT

## 2019-05-17 PROCEDURE — G8979 MOBILITY GOAL STATUS: HCPCS

## 2019-05-17 PROCEDURE — 87086 URINE CULTURE/COLONY COUNT: CPT | Performed by: PHYSICIAN ASSISTANT

## 2019-05-17 PROCEDURE — 97163 PT EVAL HIGH COMPLEX 45 MIN: CPT

## 2019-05-17 PROCEDURE — 72100 X-RAY EXAM L-S SPINE 2/3 VWS: CPT

## 2019-05-17 PROCEDURE — G8988 SELF CARE GOAL STATUS: HCPCS

## 2019-05-17 PROCEDURE — 97760 ORTHOTIC MGMT&TRAING 1ST ENC: CPT

## 2019-05-17 PROCEDURE — G8978 MOBILITY CURRENT STATUS: HCPCS

## 2019-05-17 PROCEDURE — 85027 COMPLETE CBC AUTOMATED: CPT | Performed by: PHYSICIAN ASSISTANT

## 2019-05-17 PROCEDURE — G8987 SELF CARE CURRENT STATUS: HCPCS

## 2019-05-17 PROCEDURE — 72131 CT LUMBAR SPINE W/O DYE: CPT

## 2019-05-17 PROCEDURE — 80048 BASIC METABOLIC PNL TOTAL CA: CPT | Performed by: ORTHOPAEDIC SURGERY

## 2019-05-17 RX ORDER — HYDROMORPHONE HCL 110MG/55ML
2 PATIENT CONTROLLED ANALGESIA SYRINGE INTRAVENOUS ONCE
Status: COMPLETED | OUTPATIENT
Start: 2019-05-17 | End: 2019-05-17

## 2019-05-17 RX ORDER — ZOLPIDEM TARTRATE 5 MG/1
5 TABLET ORAL
COMMUNITY

## 2019-05-17 RX ORDER — DIAZEPAM 2 MG/1
5 TABLET ORAL EVERY 6 HOURS PRN
Status: DISCONTINUED | OUTPATIENT
Start: 2019-05-17 | End: 2019-05-20 | Stop reason: HOSPADM

## 2019-05-17 RX ORDER — OXYCODONE HYDROCHLORIDE AND ACETAMINOPHEN 5; 325 MG/1; MG/1
2 TABLET ORAL EVERY 4 HOURS PRN
Status: DISCONTINUED | OUTPATIENT
Start: 2019-05-17 | End: 2019-05-20 | Stop reason: HOSPADM

## 2019-05-17 RX ORDER — HYDROCODONE BITARTRATE AND ACETAMINOPHEN 5; 325 MG/1; MG/1
2 TABLET ORAL EVERY 4 HOURS PRN
Status: DISCONTINUED | OUTPATIENT
Start: 2019-05-17 | End: 2019-05-17

## 2019-05-17 RX ORDER — LISINOPRIL 20 MG/1
20 TABLET ORAL
Status: DISCONTINUED | OUTPATIENT
Start: 2019-05-17 | End: 2019-05-20 | Stop reason: HOSPADM

## 2019-05-17 RX ORDER — DIPHENHYDRAMINE HCL 25 MG
25 TABLET ORAL EVERY 6 HOURS PRN
Status: ACTIVE | OUTPATIENT
Start: 2019-05-17 | End: 2019-05-17

## 2019-05-17 RX ADMIN — CEFAZOLIN SODIUM 1000 MG: 1 SOLUTION INTRAVENOUS at 04:28

## 2019-05-17 RX ADMIN — METHOCARBAMOL 750 MG: 750 TABLET, FILM COATED ORAL at 07:38

## 2019-05-17 RX ADMIN — HYDROMORPHONE HYDROCHLORIDE 2 MG: 2 INJECTION INTRAMUSCULAR; INTRAVENOUS; SUBCUTANEOUS at 03:20

## 2019-05-17 RX ADMIN — OXYCODONE HYDROCHLORIDE AND ACETAMINOPHEN 2 TABLET: 5; 325 TABLET ORAL at 13:20

## 2019-05-17 RX ADMIN — OXYCODONE HYDROCHLORIDE AND ACETAMINOPHEN 2 TABLET: 5; 325 TABLET ORAL at 17:37

## 2019-05-17 RX ADMIN — MORPHINE SULFATE 1 MG: 2 INJECTION, SOLUTION INTRAMUSCULAR; INTRAVENOUS at 20:23

## 2019-05-17 RX ADMIN — MORPHINE SULFATE 1 MG: 2 INJECTION, SOLUTION INTRAMUSCULAR; INTRAVENOUS at 08:23

## 2019-05-17 RX ADMIN — HYDROCODONE BITARTRATE AND ACETAMINOPHEN 2 TABLET: 5; 325 TABLET ORAL at 05:24

## 2019-05-17 RX ADMIN — PRAMIPEXOLE DIHYDROCHLORIDE 0.5 MG: 0.5 TABLET ORAL at 21:55

## 2019-05-17 RX ADMIN — DOCUSATE SODIUM 100 MG: 100 CAPSULE, LIQUID FILLED ORAL at 17:37

## 2019-05-17 RX ADMIN — GABAPENTIN 300 MG: 300 CAPSULE ORAL at 21:55

## 2019-05-17 RX ADMIN — HYDROCODONE BITARTRATE AND ACETAMINOPHEN 2 TABLET: 5; 325 TABLET ORAL at 11:25

## 2019-05-17 RX ADMIN — FAMOTIDINE 20 MG: 20 TABLET ORAL at 08:41

## 2019-05-17 RX ADMIN — ATORVASTATIN CALCIUM 40 MG: 40 TABLET, FILM COATED ORAL at 17:37

## 2019-05-18 LAB — BACTERIA UR CULT: NORMAL

## 2019-05-18 RX ADMIN — OXYCODONE HYDROCHLORIDE AND ACETAMINOPHEN 2 TABLET: 5; 325 TABLET ORAL at 21:23

## 2019-05-18 RX ADMIN — LISINOPRIL 20 MG: 20 TABLET ORAL at 21:24

## 2019-05-18 RX ADMIN — OXYCODONE HYDROCHLORIDE AND ACETAMINOPHEN 2 TABLET: 5; 325 TABLET ORAL at 07:24

## 2019-05-18 RX ADMIN — MORPHINE SULFATE 1 MG: 2 INJECTION, SOLUTION INTRAMUSCULAR; INTRAVENOUS at 04:47

## 2019-05-18 RX ADMIN — METHOCARBAMOL 750 MG: 750 TABLET, FILM COATED ORAL at 21:23

## 2019-05-18 RX ADMIN — FAMOTIDINE 20 MG: 20 TABLET ORAL at 08:24

## 2019-05-18 RX ADMIN — MORPHINE SULFATE 1 MG: 2 INJECTION, SOLUTION INTRAMUSCULAR; INTRAVENOUS at 22:34

## 2019-05-18 RX ADMIN — DOCUSATE SODIUM 100 MG: 100 CAPSULE, LIQUID FILLED ORAL at 08:23

## 2019-05-18 RX ADMIN — MORPHINE SULFATE 1 MG: 2 INJECTION, SOLUTION INTRAMUSCULAR; INTRAVENOUS at 15:23

## 2019-05-18 RX ADMIN — OXYCODONE HYDROCHLORIDE AND ACETAMINOPHEN 2 TABLET: 5; 325 TABLET ORAL at 16:59

## 2019-05-18 RX ADMIN — MORPHINE SULFATE 1 MG: 2 INJECTION, SOLUTION INTRAMUSCULAR; INTRAVENOUS at 11:35

## 2019-05-18 RX ADMIN — SENNOSIDES 8.6 MG: 8.6 TABLET, FILM COATED ORAL at 12:16

## 2019-05-18 RX ADMIN — METHOCARBAMOL 750 MG: 750 TABLET, FILM COATED ORAL at 08:23

## 2019-05-18 RX ADMIN — ATORVASTATIN CALCIUM 40 MG: 40 TABLET, FILM COATED ORAL at 17:00

## 2019-05-18 RX ADMIN — PRAMIPEXOLE DIHYDROCHLORIDE 0.5 MG: 0.5 TABLET ORAL at 21:24

## 2019-05-18 RX ADMIN — OXYCODONE HYDROCHLORIDE AND ACETAMINOPHEN 2 TABLET: 5; 325 TABLET ORAL at 12:21

## 2019-05-18 RX ADMIN — DIAZEPAM 5 MG: 2 TABLET ORAL at 08:23

## 2019-05-18 RX ADMIN — MORPHINE SULFATE 1 MG: 2 INJECTION, SOLUTION INTRAMUSCULAR; INTRAVENOUS at 08:23

## 2019-05-18 RX ADMIN — MELATONIN TAB 3 MG 9 MG: 3 TAB at 21:23

## 2019-05-18 RX ADMIN — OXYCODONE HYDROCHLORIDE AND ACETAMINOPHEN 2 TABLET: 5; 325 TABLET ORAL at 03:17

## 2019-05-18 RX ADMIN — GABAPENTIN 300 MG: 300 CAPSULE ORAL at 21:23

## 2019-05-18 RX ADMIN — MORPHINE SULFATE 1 MG: 2 INJECTION, SOLUTION INTRAMUSCULAR; INTRAVENOUS at 18:32

## 2019-05-19 PROCEDURE — 97116 GAIT TRAINING THERAPY: CPT

## 2019-05-19 PROCEDURE — 97530 THERAPEUTIC ACTIVITIES: CPT

## 2019-05-19 RX ADMIN — METHOCARBAMOL 750 MG: 750 TABLET, FILM COATED ORAL at 05:44

## 2019-05-19 RX ADMIN — MORPHINE SULFATE 1 MG: 2 INJECTION, SOLUTION INTRAMUSCULAR; INTRAVENOUS at 12:10

## 2019-05-19 RX ADMIN — GABAPENTIN 300 MG: 300 CAPSULE ORAL at 22:09

## 2019-05-19 RX ADMIN — METHOCARBAMOL 750 MG: 750 TABLET, FILM COATED ORAL at 22:09

## 2019-05-19 RX ADMIN — METHOCARBAMOL 750 MG: 750 TABLET, FILM COATED ORAL at 12:16

## 2019-05-19 RX ADMIN — MORPHINE SULFATE 1 MG: 2 INJECTION, SOLUTION INTRAMUSCULAR; INTRAVENOUS at 08:56

## 2019-05-19 RX ADMIN — MELATONIN TAB 3 MG 9 MG: 3 TAB at 22:09

## 2019-05-19 RX ADMIN — MORPHINE SULFATE 1 MG: 2 INJECTION, SOLUTION INTRAMUSCULAR; INTRAVENOUS at 20:17

## 2019-05-19 RX ADMIN — OXYCODONE HYDROCHLORIDE AND ACETAMINOPHEN 2 TABLET: 5; 325 TABLET ORAL at 15:31

## 2019-05-19 RX ADMIN — MORPHINE SULFATE 1 MG: 2 INJECTION, SOLUTION INTRAMUSCULAR; INTRAVENOUS at 04:41

## 2019-05-19 RX ADMIN — FAMOTIDINE 20 MG: 20 TABLET ORAL at 08:57

## 2019-05-19 RX ADMIN — OXYCODONE HYDROCHLORIDE AND ACETAMINOPHEN 2 TABLET: 5; 325 TABLET ORAL at 19:31

## 2019-05-19 RX ADMIN — OXYCODONE HYDROCHLORIDE AND ACETAMINOPHEN 2 TABLET: 5; 325 TABLET ORAL at 05:44

## 2019-05-19 RX ADMIN — PRAMIPEXOLE DIHYDROCHLORIDE 0.5 MG: 0.5 TABLET ORAL at 22:09

## 2019-05-19 RX ADMIN — OXYCODONE HYDROCHLORIDE AND ACETAMINOPHEN 2 TABLET: 5; 325 TABLET ORAL at 10:03

## 2019-05-19 RX ADMIN — METHOCARBAMOL 750 MG: 750 TABLET, FILM COATED ORAL at 17:44

## 2019-05-19 RX ADMIN — ATORVASTATIN CALCIUM 40 MG: 40 TABLET, FILM COATED ORAL at 15:31

## 2019-05-19 RX ADMIN — OXYCODONE HYDROCHLORIDE AND ACETAMINOPHEN 2 TABLET: 5; 325 TABLET ORAL at 01:27

## 2019-05-19 RX ADMIN — SIMETHICONE CHEW TAB 80 MG 80 MG: 80 TABLET ORAL at 19:31

## 2019-05-20 VITALS
DIASTOLIC BLOOD PRESSURE: 56 MMHG | SYSTOLIC BLOOD PRESSURE: 107 MMHG | HEIGHT: 73 IN | RESPIRATION RATE: 18 BRPM | HEART RATE: 107 BPM | BODY MASS INDEX: 28.81 KG/M2 | OXYGEN SATURATION: 95 % | TEMPERATURE: 100.3 F | WEIGHT: 217.4 LBS

## 2019-05-20 PROCEDURE — 97110 THERAPEUTIC EXERCISES: CPT

## 2019-05-20 PROCEDURE — 97530 THERAPEUTIC ACTIVITIES: CPT

## 2019-05-20 PROCEDURE — 97116 GAIT TRAINING THERAPY: CPT

## 2019-05-20 RX ORDER — OXYCODONE HYDROCHLORIDE AND ACETAMINOPHEN 5; 325 MG/1; MG/1
1 TABLET ORAL EVERY 6 HOURS PRN
Qty: 60 TABLET | Refills: 0 | Status: SHIPPED | OUTPATIENT
Start: 2019-05-20 | End: 2019-06-04

## 2019-05-20 RX ADMIN — OXYCODONE HYDROCHLORIDE AND ACETAMINOPHEN 2 TABLET: 5; 325 TABLET ORAL at 06:22

## 2019-05-20 RX ADMIN — METHOCARBAMOL 750 MG: 750 TABLET, FILM COATED ORAL at 08:09

## 2019-05-20 RX ADMIN — SIMETHICONE CHEW TAB 80 MG 80 MG: 80 TABLET ORAL at 06:25

## 2019-05-20 RX ADMIN — OXYCODONE HYDROCHLORIDE AND ACETAMINOPHEN 2 TABLET: 5; 325 TABLET ORAL at 01:56

## 2019-05-20 RX ADMIN — FAMOTIDINE 20 MG: 20 TABLET ORAL at 08:09

## 2019-08-27 ENCOUNTER — EVALUATION (OUTPATIENT)
Dept: PHYSICAL THERAPY | Facility: CLINIC | Age: 69
End: 2019-08-27
Payer: MEDICARE

## 2019-08-27 DIAGNOSIS — M48.062 SPINAL STENOSIS OF LUMBAR REGION WITH NEUROGENIC CLAUDICATION: Primary | ICD-10-CM

## 2019-08-27 PROCEDURE — 97535 SELF CARE MNGMENT TRAINING: CPT | Performed by: PHYSICAL THERAPIST

## 2019-08-27 PROCEDURE — 97162 PT EVAL MOD COMPLEX 30 MIN: CPT | Performed by: PHYSICAL THERAPIST

## 2019-08-27 NOTE — LETTER
2019    Juancarlos Chandler MD  [address] [address]    Patient: Sheree Olguin   YOB: 1950   Date of Visit: 2019     Encounter Diagnosis     ICD-10-CM    1  Spinal stenosis of lumbar region with neurogenic claudication M48 062        Dear Dr Mercer Hence:    Thank you for your recent referral of Sheree Olguin  Please review the attached evaluation summary from Derian's recent visit  Please verify that you agree with the plan of care by signing the attached order  If you have any questions or concerns, please do not hesitate to call  I sincerely appreciate the opportunity to share in the care of one of your patients and hope to have another opportunity to work with you in the near future  Sincerely,    Anoop Osorio, PT      Referring Provider:      I certify that I have read the below Plan of Care and certify the need for these services furnished under this plan of treatment while under my care  Juancarlos Chandler MD  [address] [address]  [facility] [place]          PT Evaluation     Today's date: 2019  Patient name: Sheree Olguin  : 1950  MRN: [id number]  Referring provider: Ashley Mckeon MD  Dx:   Encounter Diagnosis     ICD-10-CM    1  Spinal stenosis of lumbar region with neurogenic claudication M48 062                   Assessment  Assessment details: Sheree Olguin is a 77 y o  male who presents to outpatient PT with a  Spinal stenosis of lumbar region with neurogenic claudication  (primary encounter diagnosis)  Pt is S/P Lumbar fusion at L3/L4, L4/L5, and L5/S1, DOS   No further referral appears necessary at this time based upon examination results  Pt presents with decreased strength, ROM, functional activity tolerance, and pain with movement in his lumbar spine  Educated patient to avoid vigorous twisting and bending activities  Pt verbalizes understanding   Lumbar spine AROM is limited in all planes, which is limiting his ability to perform the aforementioned functional activities  Etiologic factors include repetitive poor body mechanics  Prognosis is good given HEP compliance and PT 2/wk  Please contact me if you have any questions or recommendations  Thank you for the opportunity to share in  Derian's care  Impairments: abnormal muscle firing, abnormal or restricted ROM, activity intolerance, difficulty understanding, impaired physical strength, lacks appropriate home exercise program, pain with function, safety issue and poor posture     Symptom irritability: moderateUnderstanding of Dx/Px/POC: good   Prognosis: good    Goals  1  In 4-6 weeks, patient will demonstrate a decrease in pain to 0/10 during functional activities  2  In 4-6 weeks, patient will demonstrate an increase in range of motion by 10 degrees  3  In 4-6 weeks, patient will demonstrate an increase in strength by 1/2 grade on MMT    1  In 6-8 weeks, patient will be independent with their home exercise program  2  In 6-8 weeks, patient will have zero limitations with strength  3  In 6-8 weeks, patient will have zero limitations with ROM  4  In 6-8 weeks, patient will have zero limitations with ambulation  5  In 6-8 weeks, patient will have zero limitations with Return to recreational activities         Plan  Plan details: Patient was provided a home exercise program and demonstrated an understanding of exercises  Patient was advised to stop performing home exercise program if symptoms increase or new complaints developed  Verbal understanding demonstrated regarding home exercise program instructions  Patient would benefit from: skilled physical therapy  Planned therapy interventions: manual therapy, therapeutic exercise and home exercise program  Frequency: 2x week  Duration in weeks: 4  Treatment plan discussed with: patient        Subjective Evaluation    History of Present Illness  Date of surgery: 5/16/2019  Mechanism of injury:  The patient is a 77year old male who presents to outpatient physical therapy with S/P lumbar fusion at L3/L4, L4/L5, L5/S1  Pt reports he was wearing a LSO,  but was told he did not have to wear it  Pt is unaware of any restrictions at this time  The only orders he has is not to play golf  He reports no limitations with sleeping, driving, ADL's, and IADL's  Pain  Current pain rating: 3  At best pain ratin  At worst pain ratin  Quality: sharp and dull ache  Relieving factors: change in position  Aggravating factors: lifting  Progression: improved    Hand dominance: right    Treatments  Current treatment: physical therapy  Patient Goals  Patient goals for therapy: increased strength, increased motion and decreased pain          Objective     Active Range of Motion     Lumbar   Flexion: 42 degrees   Extension: 25 degrees   Left lateral flexion: 63 degrees       Right lateral flexion: 60 degrees   Left Hip   Flexion: WFL  Abduction: WFL  Adduction: WFL    Right Hip   Flexion: WFL  Abduction: WFL  Adduction: WFL  Left Knee   Flexion: WFL  Extension: WFL    Right Knee   Flexion: WFL  Extension: WFL  Left Ankle/Foot   Dorsiflexion (kf): WFL  Plantar flexion: WFL    Right Ankle/Foot   Dorsiflexion (kf): WFL  Plantar flexion: WFL    Strength/Myotome Testing     Left Hip   Planes of Motion   Flexion: 4-  Abduction: 4-  Adduction: 4-    Right Hip   Planes of Motion   Flexion: 4-  Abduction: 4-  Adduction: 4-    Left Knee   Flexion: 4  Extension: 4    Right Knee   Flexion: 4  Extension: 4    Left Ankle/Foot   Dorsiflexion: 4  Plantar flexion: 4    Right Ankle/Foot   Dorsiflexion: 4  Plantar flexion: 4    Additional Strength Details  Decreased sensation to left leg  Surgical incision(s) inspected  Incision(s) clean, dry and intact with no signs/symptoms of infection  Patient was educated on signs/symptoms of infection and was advised to contact MD immediately if suspect infection                  Precautions: Anxiety, Arthritis, DDD, Depression, DVT, HTN, Spinal Stenosis, TIA, Hx of Left tib/fib fx   RA  Due 9/24     S/P Lumbar Fusion DOS 5/16     Daily Treatment Diary     Manual              B/L Hamstring                                                                      Exercise Diary              PPT             Hip Add with Ball             Clamshells              SLR             Supine Marching              Pball ABS              Step Ups              Mini Squat              SLS              HR/TR             Nu Step              DLS ALt UE              DLS ALT LE              DLS ALT UE/LE             Marches in // Bars              Quadruped UE/LE                                                                     Modalities

## 2019-08-27 NOTE — PROGRESS NOTES
PT Evaluation     Today's date: 2019  Patient name: Sotero Jean-Baptiste  : 1950  MRN: [id number]  Referring provider: Therese Swenson MD  Dx:   Encounter Diagnosis     ICD-10-CM    1  Spinal stenosis of lumbar region with neurogenic claudication M48 062                   Assessment  Assessment details: Sotero Jean-Baptiste is a 77 y o  male who presents to outpatient PT with a  Spinal stenosis of lumbar region with neurogenic claudication  (primary encounter diagnosis)  Pt is S/P Lumbar fusion at L3/L4, L4/L5, and L5/S1, DOS   No further referral appears necessary at this time based upon examination results  Pt presents with decreased strength, ROM, functional activity tolerance, and pain with movement in his lumbar spine  Educated patient to avoid vigorous twisting and bending activities  Pt verbalizes understanding  Lumbar spine AROM is limited in all planes, which is  limiting his ability to perform the aforementioned functional activities  Etiologic factors include repetitive poor body mechanics  Prognosis is good given HEP compliance and PT 2/wk  Please contact me if you have any questions or recommendations  Thank you for the opportunity to share in  Derian's care  Impairments: abnormal muscle firing, abnormal or restricted ROM, activity intolerance, difficulty understanding, impaired physical strength, lacks appropriate home exercise program, pain with function, safety issue and poor posture     Symptom irritability: moderateUnderstanding of Dx/Px/POC: good   Prognosis: good    Goals  1  In 4-6 weeks, patient will demonstrate a decrease in pain to 0/10 during functional activities  2  In 4-6 weeks, patient will demonstrate an increase in range of motion by 10 degrees  3  In 4-6 weeks, patient will demonstrate an increase in strength by 1/2 grade on MMT    1  In 6-8 weeks, patient will be independent with their home exercise program  2    In 6-8 weeks, patient will have zero limitations with strength  3  In 6-8 weeks, patient will have zero limitations with ROM  4  In 6-8 weeks, patient will have zero limitations with ambulation  5  In 6-8 weeks, patient will have zero limitations with Return to recreational activities         Plan  Plan details: Patient was provided a home exercise program and demonstrated an understanding of exercises  Patient was advised to stop performing home exercise program if symptoms increase or new complaints developed  Verbal understanding demonstrated regarding home exercise program instructions  Patient would benefit from: skilled physical therapy  Planned therapy interventions: manual therapy, therapeutic exercise and home exercise program  Frequency: 2x week  Duration in weeks: 4  Treatment plan discussed with: patient        Subjective Evaluation    History of Present Illness  Date of surgery: 2019  Mechanism of injury: The patient is a 77year old male who presents to outpatient physical therapy with S/P lumbar fusion at L3/L4, L4/L5, L5/S1  Pt reports he was wearing a LSO,  but was told he did not have to wear it  Pt is unaware of any restrictions at this time  The only orders he has is not to play golf  He reports no limitations with sleeping, driving, ADL's, and IADL's     Pain  Current pain rating: 3  At best pain ratin  At worst pain ratin  Quality: sharp and dull ache  Relieving factors: change in position  Aggravating factors: lifting  Progression: improved    Hand dominance: right    Treatments  Current treatment: physical therapy  Patient Goals  Patient goals for therapy: increased strength, increased motion and decreased pain          Objective     Active Range of Motion     Lumbar   Flexion: 42 degrees   Extension: 25 degrees   Left lateral flexion: 63 degrees       Right lateral flexion: 60 degrees   Left Hip   Flexion: WFL  Abduction: WFL  Adduction: WFL    Right Hip   Flexion: WFL  Abduction: WFL  Adduction: WFL  Left Knee   Flexion: WFL  Extension: WFL    Right Knee   Flexion: WFL  Extension: WFL  Left Ankle/Foot   Dorsiflexion (kf): WFL  Plantar flexion: WFL    Right Ankle/Foot   Dorsiflexion (kf): WFL  Plantar flexion: WFL    Strength/Myotome Testing     Left Hip   Planes of Motion   Flexion: 4-  Abduction: 4-  Adduction: 4-    Right Hip   Planes of Motion   Flexion: 4-  Abduction: 4-  Adduction: 4-    Left Knee   Flexion: 4  Extension: 4    Right Knee   Flexion: 4  Extension: 4    Left Ankle/Foot   Dorsiflexion: 4  Plantar flexion: 4    Right Ankle/Foot   Dorsiflexion: 4  Plantar flexion: 4    Additional Strength Details  Decreased sensation to left leg  Surgical incision(s) inspected  Incision(s) clean, dry and intact with no signs/symptoms of infection  Patient was educated on signs/symptoms of infection and was advised to contact MD immediately if suspect infection  Precautions: Anxiety, Arthritis, DDD, Depression, DVT, HTN, Spinal Stenosis, TIA, Hx of Left tib/fib fx   RA  Due 9/24     S/P Lumbar Fusion DOS 5/16     Daily Treatment Diary     Manual              B/L Hamstring                                                                      Exercise Diary              PPT             Hip Add with Ball             Clamshells              SLR             Supine Marching              Pball ABS              Step Ups              Mini Squat              SLS              HR/TR             Nu Step              DLS ALt UE              DLS ALT LE              DLS ALT UE/LE             Marches in // Bars              Quadruped UE/LE                                                                     Modalities

## 2019-08-30 ENCOUNTER — OFFICE VISIT (OUTPATIENT)
Dept: PHYSICAL THERAPY | Facility: CLINIC | Age: 69
End: 2019-08-30
Payer: MEDICARE

## 2019-08-30 DIAGNOSIS — M48.062 SPINAL STENOSIS OF LUMBAR REGION WITH NEUROGENIC CLAUDICATION: Primary | ICD-10-CM

## 2019-08-30 PROCEDURE — 97150 GROUP THERAPEUTIC PROCEDURES: CPT | Performed by: PHYSICAL THERAPIST

## 2019-08-30 NOTE — PROGRESS NOTES
Daily Note     Today's date: 2019  Patient name: Fab Murphy  : 1950  MRN: [id number]  Referring provider: Naida Kingston MD  Dx:   Encounter Diagnosis     ICD-10-CM    1  Spinal stenosis of lumbar region with neurogenic claudication M48 062                   Subjective:     "my back is a little sore today "      Objective: See treatment diary below   Precautions: Anxiety, Arthritis, DDD, Depression, DVT, HTN, Spinal Stenosis, TIA, Hx of Left tib/fib fx  RA  Due      S/P Lumbar Fusion DOS    Manual              B/L Hamstring                                                                      Exercise Diary               PPT 5'20x            Hip Add with Ball 5''20x             Clamshells  5''20x Black             SLR 2x10 Flexion             Supine Marching  5''20x             Pball ABS  5''20x            Step Ups              Mini Squat              SLS              HR/TR             Nu Step              DLS ALt UE  60N B/L             DLS ALT LE  34W B/L             DLS ALT UE/LE             Marches in // Bars              Quadruped UE/LE             TB MTP/LTP Red 5''20x            TB anti-rotation  Red 5''10x B/l             Self HS 30''3x B/L                              Modalities                                                         Assessment: Tolerated treatment well  Patient exhibited good technique with therapeutic exercises and would benefit from continued PT  Weakness in Dynamic lumbar stability during seated DLS exercises  Patient requests to stop therapy 20 minutes early  Plan: Continue per plan of care

## 2019-09-03 ENCOUNTER — OFFICE VISIT (OUTPATIENT)
Dept: PHYSICAL THERAPY | Facility: CLINIC | Age: 69
End: 2019-09-03
Payer: MEDICARE

## 2019-09-03 DIAGNOSIS — M48.062 SPINAL STENOSIS OF LUMBAR REGION WITH NEUROGENIC CLAUDICATION: Primary | ICD-10-CM

## 2019-09-03 PROCEDURE — 97110 THERAPEUTIC EXERCISES: CPT | Performed by: PHYSICAL THERAPIST

## 2019-09-03 NOTE — PROGRESS NOTES
Daily Note     Today's date: 9/3/2019  Patient name: Red Velazquez  : 1950  MRN: [id number]  Referring provider: Carine Jenkins MD  Dx:   Encounter Diagnosis     ICD-10-CM    1  Spinal stenosis of lumbar region with neurogenic claudication M48 062                   Subjective: " I have some new pain in my left leg  I am not used to this, the pain comes and goes  I have been doing a lot at home "       Objective: See treatment diary below  Manual   9/3            B/L Hamstring                                                                      Exercise Diary  8/30  9/3            PPT 5'20x 5''20x            Hip Add with Ball 5''20x  5''20x            Clamshells  5''20x Black  5''20x Black            SLR 2x10 Flexion  2x10 Flexion            Supine Marching  5''20x             Pball ABS  5''20x 5''20x            Step Ups   B 2x10 B/L            Mini Squat   2x10 B/L            SLS              HR/TR  20x           Nu Step   L3 x 10 min            DLS ALt UE  68D B/L  2x10           DLS ALT LE  07D B/L  2x10 B/L            DLS ALT UE/LE  3H73 B/L            Marches in // Bars              Quadruped UE/LE             TB MTP/LTP Red 5''20x Pt Defers            TB anti-rotation  Red 5''10x B/l  Pt Defers            Self HS 30''3x B/L  30''3x B/L                             Modalities                                                         Assessment: Tolerated treatment fair  Patient exhibited good technique with therapeutic exercises and would benefit from continued PT  Pt ambulates in an antalgic gait pattern and SPC this date  Pt defers multiple exercises this date due to pain in neck and subjective comments  Pt impulsive during PT session  Advised to contact MD if sx continue to worsen  Plan: Continue per plan of care

## 2019-09-06 ENCOUNTER — OFFICE VISIT (OUTPATIENT)
Dept: PHYSICAL THERAPY | Facility: CLINIC | Age: 69
End: 2019-09-06
Payer: MEDICARE

## 2019-09-06 DIAGNOSIS — M48.062 SPINAL STENOSIS OF LUMBAR REGION WITH NEUROGENIC CLAUDICATION: Primary | ICD-10-CM

## 2019-09-06 PROCEDURE — 97110 THERAPEUTIC EXERCISES: CPT

## 2019-09-06 NOTE — PROGRESS NOTES
Daily Note     Today's date: 2019  Patient name: Roger May  : 1950  MRN: [id number]  Referring provider: Sofia Akers MD  Dx:   Encounter Diagnosis     ICD-10-CM    1  Spinal stenosis of lumbar region with neurogenic claudication M48 062                   Subjective: "My leg pain still comes and goes "      Objective: See treatment diary below  Manual   9/3  9/6     B/L Hamstring                                             Exercise Diary  8/30  9/3  9/6     PPT 5'20x 5''20x  5" x20     Hip Add with Ball 5''20x  5''20x  5" x20     Clamshells  5''20x Black  5''20x Black  5" x20 Black TB     SLR 2x10 Flexion  2x10 Flexion  2x10 Flex     Supine Marching  5''20x        Pball ABS  5''20x 5''20x  5" x20 ea     Step Ups   B 2x10 B/L  "B" 2x10     Mini Squat   2x10 B/L  2x10 ea     SLS         HR/TR  20x x30     Nu Step   L3 x 10 min  L3 x 10 min      DLS ALT UE  67F B/L  2x10 Seated on low mat 2x10 ea     DLS ALT LE  97Q B/L  2x10 B/L  Seat on low mat 2x10 ea     DLS ALT UE/LE  3F31 B/L  Seat on low mat 2x10 ea     Marches in // Bars         Quadruped UE/LE        TB MTP/LTP Red 5''20x Pt Defers  Defers     TB anti-rotation  Red 5''10x B/l  Pt Defers  Defers      Self HS 30''3x B/L  30''3x B/L  30" x3 ea                 Modalities                                     Assessment: Tolerated treatment well  Pt has noted tight R HS muscles during a self HS stretch  Patient demonstrated fatigue post treatment and would benefit from continued PT      Plan: Continue per plan of care

## 2019-09-09 ENCOUNTER — OFFICE VISIT (OUTPATIENT)
Dept: PHYSICAL THERAPY | Facility: CLINIC | Age: 69
End: 2019-09-09
Payer: MEDICARE

## 2019-09-09 DIAGNOSIS — M48.062 SPINAL STENOSIS OF LUMBAR REGION WITH NEUROGENIC CLAUDICATION: Primary | ICD-10-CM

## 2019-09-09 PROCEDURE — 97150 GROUP THERAPEUTIC PROCEDURES: CPT

## 2019-09-09 NOTE — PROGRESS NOTES
Daily Note     Today's date: 2019  Patient name: Awais Schumacher  : 1950  MRN: [id number]  Referring provider: Dakota Corondao MD  Dx:   Encounter Diagnosis     ICD-10-CM    1  Spinal stenosis of lumbar region with neurogenic claudication M48 062                   Subjective: "My back feels pretty good today "      Objective: See treatment diary below  Manual   9/3  9/6 9/9    B/L Hamstring                                             Exercise Diary  8/30  9/3  9/6 9/9    PPT 5'20x 5''20x  5" x20 5" x20    Hip Add with Ball 5''20x  5''20x  5" x20 5" x20    Clamshells  5''20x Black  5''20x Black  5" x20 Bl5" x20 Black TBack TB 5" x20 Black TB    SLR 2x10 Flexion  2x10 Flexion  2x10 Flex 5" x20 Black TB    Supine Marching  5''20x        Pball ABS  5''20x 5''20x  5" x20 ea 5" x20 ea    Step Ups   B 2x10 B/L  "B" 2x10 "B" 2x10    Mini Squat   2x10 B/L  2x10 ea 2x10 ea    SLS         HR/TR  20x x30 x30    Nu Step   L3 x 10 min  L3 x 10 min  L3 x 10 min     DLS ALT UE  22T B/L  2x10 Seated on low mat 2x10 ea Seated on low mat 2x10 ea    DLS ALT LE  94Z B/L  2x10 B/L  Seat on low mat 2x10 ea Seated on low mat 2x10 ea    DLS ALT UE/LE  5C67 B/L  Seat on low mat 2x10 ea Seated on low mat 2x10 ea    Marches in // Bars         Quadruped UE/LE        TB MTP/LTP Red 5''20x Pt Defers  Defers     TB anti-rotation  Red 5''10x B/l  Pt Defers  Defers      Self HS 30''3x B/L  30''3x B/L  30" x3 ea 30" x3                Modalities                                   Assessment: Tolerated treatment well  Pt continues to have limited HS ROM  Patient demonstrated fatigue post treatment and would benefit from continued PT      Plan: Continue per plan of care

## 2019-09-11 ENCOUNTER — OFFICE VISIT (OUTPATIENT)
Dept: PHYSICAL THERAPY | Facility: CLINIC | Age: 69
End: 2019-09-11
Payer: MEDICARE

## 2019-09-11 DIAGNOSIS — M48.062 SPINAL STENOSIS OF LUMBAR REGION WITH NEUROGENIC CLAUDICATION: Primary | ICD-10-CM

## 2019-09-11 PROCEDURE — 97110 THERAPEUTIC EXERCISES: CPT

## 2019-09-11 NOTE — PROGRESS NOTES
Daily Note     Today's date: 2019  Patient name: Marysol Howell  : 1950  MRN: [id number]  Referring provider: Noemi Farooq MD  Dx:   Encounter Diagnosis     ICD-10-CM    1  Spinal stenosis of lumbar region with neurogenic claudication M48 062                   Subjective: "My knee is bothering me a bit today "      Objective: See treatment diary below  Manual   9/3  9/6 9/9 9/11   B/L Hamstring                                             Exercise Diary  8/30  9/3  9/6 9/9 9/11   PPT 5'20x 5''20x  5" x20 5" x20 5" x30   Hip Add with Ball 5''20x  5''20x  5" x20 5" x20 5" x20   Clamshells  5''20x Black  5''20x Black  5"  x20 Black TB 5" x20 Black TB 5" x20 Black TB   SLR 2x10 Flexion  2x10 Flexion  2x10 Flex 2x10 Flex 2x10 Flex 1 5#   Supine Marching  5''20x        Pball ABS  5''20x 5''20x  5" x20 ea 5" x20 ea 5" x20   Step Ups   B 2x10 B/L  "B" 2x10 "B" 2x10 "C" 2x10   Mini Squat   2x10 B/L  2x10 ea 2x10 ea 3x10   SLS         HR/TR  20x x30 x30 x30   Nu Step   L3 x 10 min  L3 x 10 min  L3 x 10 min  L4 x10 min   DLS ALT UE  89P B/L  2x10 Seated on low mat 2x10 ea Seated on low mat 2x10 ea Seated on low mat 2x10 ea   DLS ALT LE  62R B/L  2x10 B/L  Seat on low mat 2x10 ea Seated on low mat 2x10 ea Seated on low mat 2x10 ea   DLS ALT UE/LE  5N76 B/L  Seat on low mat 2x10 ea Seated on low mat 2x10 ea Seated on low mat 2x10 ea   Marches in // Bars         Quadruped UE/LE        TB MTP/LTP Red 5''20x Pt Defers  Defers     TB anti-rotation  Red 5''10x B/l  Pt Defers  Defers      Self HS 30''3x B/L  30''3x B/L  30" x3 ea 30" x3 30" x3               Modalities                                     Assessment: Tolerated treatment well  Progressed reps of multiple exercises and added cuff weights to increase strength and endurance of the LE and core  Patient demonstrated fatigue post treatment and would benefit from continued PT      Plan: Continue per plan of care

## 2019-09-16 ENCOUNTER — OFFICE VISIT (OUTPATIENT)
Dept: PHYSICAL THERAPY | Facility: CLINIC | Age: 69
End: 2019-09-16
Payer: MEDICARE

## 2019-09-16 DIAGNOSIS — M48.062 SPINAL STENOSIS OF LUMBAR REGION WITH NEUROGENIC CLAUDICATION: Primary | ICD-10-CM

## 2019-09-16 PROCEDURE — 97110 THERAPEUTIC EXERCISES: CPT

## 2019-09-16 NOTE — PROGRESS NOTES
Daily Note     Today's date: 2019  Patient name: Edis Rutledge  : 1950  MRN: [id number]  Referring provider: Ziyad Kent MD  Dx:   Encounter Diagnosis     ICD-10-CM    1  Spinal stenosis of lumbar region with neurogenic claudication M48 062                   Subjective: "I feel okay  My R leg is stiff "      Objective: See treatment diary below  Manual  9/16 9/3  9/6 9/9 9/11   B/L Hamstring                                             Exercise Diary  9/16 9/3  9/6 9/9 9/11   PPT 5'20x 5''20x  5" x20 5" x20 5" x30   Hip Add with Ball 5''20x  5''20x  5" x20 5" x20 5" x20   Clamshells  5''20x Black TB 5''20x Black  5"  x20 Black TB 5" x20 Black TB 5" x20 Black TB   SLR 3x10 Flex 1 5# 2x10 Flexion  2x10 Flex 2x10 Flex 2x10 Flex 1 5#   Supine Marching         Pball ABS  5''20x 5''20x  5" x20 ea 5" x20 ea 5" x20   Step Ups  "C" 3x10  B 2x10 B/L  "B" 2x10 "B" 2x10 "C" 2x10   Mini Squat  3x10 2x10 B/L  2x10 ea 2x10 ea 3x10   SLS         HR/TR x30 20x x30 x30 x30   Nu Step  L4 x10 min L3 x 10 min  L3 x 10 min  L3 x 10 min  L4 x10 min   DLS ALT UE  Seated on Dynadisc 2x10 ea  2x10 Seated on low mat 2x10 ea Seated on low mat 2x10 ea Seated on low mat 2x10 ea   DLS ALT LE  Seated on Dynadisk 2x10 ea 2x10 B/L  Seat on low mat 2x10 ea Seated on low mat 2x10 ea Seated on low mat 2x10 ea   DLS ALT UE/LE Seated on Dynadisc 2x10 ea 2x10 B/L  Seat on low mat 2x10 ea Seated on low mat 2x10 ea Seated on low mat 2x10 ea   Marches in // Bars         Quadruped UE/LE        TB MTP/LTP  Pt Defers  Defers     TB anti-rotation   Pt Defers  Defers      Self HS 30''3x B/L  30''3x B/L  30" x3 ea 30" x3 30" x3   Leg Press            Modalities                                   Assessment: Tolerated treatment well  Continues to have limited hip flexion secondary to a tight R HS  Progressed DLS exercises by adding the dynadisc   Patient demonstrated fatigue post treatment and would benefit from continued PT      Plan: Continue per plan of care

## 2019-09-18 ENCOUNTER — OFFICE VISIT (OUTPATIENT)
Dept: PHYSICAL THERAPY | Facility: CLINIC | Age: 69
End: 2019-09-18
Payer: MEDICARE

## 2019-09-18 DIAGNOSIS — M48.062 SPINAL STENOSIS OF LUMBAR REGION WITH NEUROGENIC CLAUDICATION: Primary | ICD-10-CM

## 2019-09-18 PROCEDURE — 97110 THERAPEUTIC EXERCISES: CPT

## 2019-09-18 NOTE — PROGRESS NOTES
Daily Note     Today's date: 2019  Patient name: Noy Deras  : 1950  MRN: [id number]  Referring provider: See Ramirez MD  Dx:   Encounter Diagnosis     ICD-10-CM    1  Spinal stenosis of lumbar region with neurogenic claudication M48 062                   Subjective: Pt denies pain LB  C/o L knee pain today which pt reports he had TKR  2 years ago  Objective: See treatment diary below      Assessment: Tolerated treatment well  Performed step ups on "B" step today (decreased height) secondary to c/o L knee pain with higher step  Patient exhibited good technique with therapeutic exercises and would benefit from continued PT      Plan: Continue per plan of care        Manual     B/L Hamstring                                             Exercise Diary     PPT 5'20x 5" x20 5" x20 5" x20 5" x30   Hip Add with Ball 5''20x  5" x20 5" x20 5" x20 5" x20   Clamshells  5''20x Black TB Black   5" x20 5"  x20 Black TB 5" x20 Black TB 5" x20 Black TB   SLR 3x10 Flex 1 5# Flex 1 5#   3x10 ea   2x10 Flex 2x10 Flex 2x10 Flex 1 5#   Supine Marching         Pball ABS  5''20x 5" x20 5" x20 ea 5" x20 ea 5" x20   Step Ups  "C" 3x10  "B" 3x10 "B" 2x10 "B" 2x10 "C" 2x10   Mini Squat  3x10 2x10 2x10 ea 2x10 ea 3x10   SLS         HR/TR x30 x30 x30 x30 x30   Nu Step  L4 x10 min L4 x10 min L3 x 10 min  L3 x 10 min  L4 x10 min   DLS ALT UE  Seated on Dynadisc 2x10 ea  Seated on  Dynadisc  2x10ea Seated on low mat 2x10 ea Seated on low mat 2x10 ea Seated on low mat 2x10 ea   DLS ALT LE  Seated on Dynadisk 2x10 ea Seated on   Ros disc  2x10 ea Seat on low mat 2x10 ea Seated on low mat 2x10 ea Seated on low mat 2x10 ea   DLS ALT UE/LE Seated on Dynadisc 2x10 ea Seated on dynadisc  2x10 ea Seat on low mat 2x10 ea Seated on low mat 2x10 ea Seated on low mat 2x10 ea   Marches in // Bars         Quadruped UE/LE        TB MTP/LTP   Defers     TB anti-rotation    Defers      Self HS 30''3x B/L  30" x3 ea 30" x3 ea 30" x3 30" x3   Leg Press            Modalities

## 2019-09-20 ENCOUNTER — OFFICE VISIT (OUTPATIENT)
Dept: PHYSICAL THERAPY | Facility: CLINIC | Age: 69
End: 2019-09-20
Payer: MEDICARE

## 2019-09-20 DIAGNOSIS — M48.062 SPINAL STENOSIS OF LUMBAR REGION WITH NEUROGENIC CLAUDICATION: Primary | ICD-10-CM

## 2019-09-20 PROCEDURE — 97140 MANUAL THERAPY 1/> REGIONS: CPT

## 2019-09-20 PROCEDURE — 97110 THERAPEUTIC EXERCISES: CPT

## 2019-09-20 NOTE — PROGRESS NOTES
Daily Note     Today's date: 2019  Patient name: Nena Patel  : 1950  MRN: [id number]  Referring provider: Dave Betts MD  Dx:   Encounter Diagnosis     ICD-10-CM    1  Spinal stenosis of lumbar region with neurogenic claudication M48 062                   Subjective: "I woke up yesterday with stabbing pain in my back  It has calmed down a bit and is no longer going down my leg  I still am having pain in the center of my L/S area "      Objective: See treatment diary below      Assessment: Pt amb to therapy with a SPC  Modified program secondary to subjective comments  Manually stretched B/L HS  Tolerated treatment fair  Patient demonstrated fatigue post treatment  Instructed pt to contact MD if pain does not subside  Pt verbally agrees  Plan: Continue per plan of care        Manual     B/L Hamstring    x                                10 min         Exercise Diary     PPT 5'20x 5" x20 5" x20 5" x20 5" x30   Hip Add with Ball 5''20x  5" x20 5" x20 5" x20 5" x20   Clamshells  5''20x Black TB Black   5" x20 5"  x20 Black TB 5" x20 Black TB 5" x20 Black TB   SLR 3x10 Flex 1 5# Flex 1 5#   3x10 ea   2x10 Flex  0# 2x10 Flex 2x10 Flex 1 5#   Supine Marching         Pball ABS  5''20x 5" x20 5" x20 ea 5" x20 ea 5" x20   Step Ups  "C" 3x10  "B" 3x10 "B" 2x10 LLE    x3 on RLE w/ pain "B" 2x10 "C" 2x10   Mini Squat  3x10 2x10 2x10 ea 2x10 ea 3x10   SLS         HR/TR x30 x30 x30 x30 x30   Nu Step  L4 x10 min L4 x10 min L3 x 10 min  L3 x 10 min  L4 x10 min   DLS ALT UE  Seated on Dynadisc 2x10 ea  Seated on  Dynadisc  2x10ea Seated on low mat 2x10 ea    Held Disc Seated on low mat 2x10 ea Seated on low mat 2x10 ea   DLS ALT LE  Seated on Dynadisk 2x10 ea Seated on   Ors disc  2x10 ea Seat on low mat 2x10 ea    Held Disc Seated on low mat 2x10 ea Seated on low mat 2x10 ea   DLS ALT UE/LE Seated on Dynadisc 2x10 ea Seated on dynadisc  2x10 ea Seat on low mat 2x10 ea    Held disc  Seated on low mat 2x10 ea Seated on low mat 2x10 ea   Peña in // Bars         Quadruped UE/LE        TB MTP/LTP        TB anti-rotation         Self HS 30''3x B/L  30" x3 ea 30" x3 ea 30" x3 30" x3   Leg Press            Modalities

## 2019-09-23 ENCOUNTER — APPOINTMENT (OUTPATIENT)
Dept: PHYSICAL THERAPY | Facility: CLINIC | Age: 69
End: 2019-09-23
Payer: MEDICARE

## 2019-09-26 ENCOUNTER — APPOINTMENT (OUTPATIENT)
Dept: PHYSICAL THERAPY | Facility: CLINIC | Age: 69
End: 2019-09-26
Payer: MEDICARE

## 2019-10-14 ENCOUNTER — EVALUATION (OUTPATIENT)
Dept: PHYSICAL THERAPY | Facility: CLINIC | Age: 69
End: 2019-10-14
Payer: MEDICARE

## 2019-10-14 DIAGNOSIS — M48.062 SPINAL STENOSIS OF LUMBAR REGION WITH NEUROGENIC CLAUDICATION: Primary | ICD-10-CM

## 2019-10-14 PROCEDURE — 97164 PT RE-EVAL EST PLAN CARE: CPT | Performed by: PHYSICAL THERAPIST

## 2019-10-14 PROCEDURE — 97110 THERAPEUTIC EXERCISES: CPT | Performed by: PHYSICAL THERAPIST

## 2019-10-14 NOTE — PROGRESS NOTES
PT Re-Evaluation       Assessment  Assessment details: Brian Tom is a 77 y o  male who presents to outpatient PT with a  Spinal stenosis of lumbar region with neurogenic claudication  (primary encounter diagnosis)  Pt is S/P Lumbar fusion at L3/L4, L4/L5, and L5/S1, DOS 5/16  No further referral appears necessary at this time based upon examination results  Pt presents with decreased strength, ROM, functional activity tolerance, and pain with movement in his lumbar spine  Educated patient to avoid vigorous twisting and bending activities  Pt verbalizes understanding  Lumbar spine AROM is limited in all planes, which is  limiting his ability to perform the aforementioned functional activities  Etiologic factors include repetitive poor body mechanics  Prognosis is good given HEP compliance and PT 2/wk  Please contact me if you have any questions or recommendations  Thank you for the opportunity to share in  Derian's care  RA 10/14    Brian Tom has demonstrated decreased pain, increased strength, increased range of motion, and increased activity tolerance since starting physical therapy services  He reports an overall improvement of 80% thus far  Pt ambulates into PT with no assistive device this date  Lumbar ROM, and B/L LE strength has improved in all planes since [facility]  Advised patient that he should not be on the golf course, until he is cleared by his MD   He continues to present with pain, decreased strength, decreased range of motion, and decreased activity tolerance and would benefit from additional skilled physical therapy interventions to address impairments and maximize function            Impairments: abnormal muscle firing, abnormal or restricted ROM, activity intolerance, difficulty understanding, impaired physical strength, lacks appropriate home exercise program, pain with function, safety issue and poor posture     Symptom irritability: moderateUnderstanding of Dx/Px/POC: good   Prognosis: good    Goals  1  In 4-6 weeks, patient will demonstrate a decrease in pain to 0/10 during functional activities  2  In 4-6 weeks, patient will demonstrate an increase in range of motion by 10 degrees  Met   3  In 4-6 weeks, patient will demonstrate an increase in strength by 1/2 grade on MMT  Met     1  In 6-8 weeks, patient will be independent with their home exercise program  Met   2  In 6-8 weeks, patient will have zero limitations with strength  3  In 6-8 weeks, patient will have zero limitations with ROM  4  In 6-8 weeks, patient will have zero limitations with ambulation  Met   5  In 6-8 weeks, patient will have zero limitations with Return to recreational activities         Plan  Plan details: Patient was provided a home exercise program and demonstrated an understanding of exercises  Patient was advised to stop performing home exercise program if symptoms increase or new complaints developed  Verbal understanding demonstrated regarding home exercise program instructions  Patient would benefit from: skilled physical therapy  Planned therapy interventions: manual therapy, therapeutic exercise and home exercise program  Frequency: 2x week  Duration in weeks: 4  Treatment plan discussed with: patient        Subjective Evaluation    History of Present Illness  Date of surgery: 5/16/2019  Mechanism of injury: The patient is a 77year old male who presents to outpatient physical therapy with S/P lumbar fusion at L3/L4, L4/L5, L5/S1  Pt reports he was wearing a LSO,  but was told he did not have to wear it  Pt is unaware of any restrictions at this time  The only orders he has is not to play golf  He reports no limitations with sleeping, driving, ADL's, and IADL's     RA 10/14     The patient reports an improvement of 80 percent since beginning skilled physical therapy services  He recently took three weeks off of outpatient PT, and has been doing aquatic therapy on his own in a pool   He reports he feels much better  He is ambulating with no AD at this time  He reports that he was golfing on the golf course a "short game " He denies any radicular sx into his bilateral LE's at this time     Pain                                            10/14   Current pain rating: 3                      2  At best pain ratin                       0   At worst pain ratin                     7   Quality: sharp and dull ache  Relieving factors: change in position  Aggravating factors: lifting  Progression: improved    Hand dominance: right    Treatments  Current treatment: physical therapy  Patient Goals  Patient goals for therapy: increased strength, increased motion and decreased pain          Objective     Active Range of Motion     Lumbar                                                  RA 10/14   Flexion: 42 degrees                                 32 degrees  Extension: 25 degrees                            25 degrees   Left lateral flexion: 63 degrees                57 degrees           Right lateral flexion: 60 degrees              55 degrees   Left Hip   Flexion: WFL  Abduction: WFL  Adduction: WFL    Right Hip   Flexion: WFL  Abduction: WFL  Adduction: WFL  Left Knee   Flexion: WFL  Extension: WFL    Right Knee   Flexion: WFL  Extension: WFL  Left Ankle/Foot   Dorsiflexion (kf): WFL  Plantar flexion: WFL    Right Ankle/Foot   Dorsiflexion (kf): WFL  Plantar flexion: [facility]    Strength/Myotome Testing     Left Hip                       RA 10/14   Planes of Motion   Flexion: 4-                          4/5   Abduction: 4-                     4/5  Adduction: 4-                     4/5     Right Hip   Planes of Motion   Flexion: 4-                      4/5   Abduction: 4-                 4/5   Adduction: 4-                  4/5     Left Knee   Flexion: 4                         4+/5   Extension: 4                    4+/5     Right Knee   Flexion: 4                        4+/5   Extension: 4                   4+/5 Left Ankle/Foot   Dorsiflexion: 4              4+/5   Plantar flexion: 4          4+/5     Right Ankle/Foot   Dorsiflexion: 4               4+/5   Plantar flexion: 4           4+/5     Additional Strength Details  Decreased sensation to left leg  Surgical incision(s) inspected  Incision(s) clean, dry and intact with no signs/symptoms of infection  Patient was educated on signs/symptoms of infection and was advised to contact MD immediately if suspect infection       Manual  9/16 9/18 9/20 10/14 9/11   B/L Hamstring    x D/C Self hamstring                                10 min         Exercise Diary  9/16 9/18 9/20 10/14 9/11   PPT 5'20x 5" x20 5" x20 5" x20 5" x30   Hip Add with Ball 5''20x  5" x20 5" x20 5" x20 5" x20   Clamshells  5''20x Black TB Black   5" x20 5"  x20 Black TB 5" x20 Black TB 5" x20 Black TB   SLR 3x10 Flex 1 5# Flex 1 5#   3x10 ea   2x10 Flex  0# 2x10 Flex 2#  2x10 Flex 1 5#   Supine Marching         Pball ABS  5''20x 5" x20 5" x20 ea 5" x20 ea 5" x20   Step Ups  "C" 3x10  "B" 3x10 "B" 2x10 LLE    x3 on RLE w/ pain "C" 2x10  "C" 2x10   Mini Squat  3x10 2x10 2x10 ea 2x10 ea 3x10   SLS         HR/TR x30 x30 x30 x30 x30   Nu Step  L4 x10 min L4 x10 min L3 x 10 min  L3 x 10 min  L4 x10 min   DLS ALT UE  Seated on Dynadisc 2x10 ea  Seated on  Dynadisc  2x10ea Seated on low mat 2x10 ea    Held Disc Seated on low mat 2x10 ea Seated on low mat 2x10 ea   DLS ALT LE  Seated on Dynadisk 2x10 ea Seated on   Ros disc  2x10 ea Seat on low mat 2x10 ea    Held Disc Seated on low mat 2x10 ea Seated on low mat 2x10 ea   DLS ALT UE/LE Seated on Dynadisc 2x10 ea Seated on dynadisc  2x10 ea Seat on low mat 2x10 ea    Held disc  Seated on low mat 2x10 ea Seated on low mat 2x10 ea   Marches in // Bars         Quadruped UE/LE        TB MTP/LTP        TB anti-rotation         Self HS 30''3x B/L  30" x3 ea 30" x3 ea 30" x3 30" x3   Leg Press            Modalities

## 2019-10-14 NOTE — LETTER
October 14, 2019    Zachariah Eddy MD  [facility]    Patient: Tyson Goins   YOB: 1950   Date of Visit: 10/14/2019     Encounter Diagnosis     ICD-10-CM    1  Spinal stenosis of lumbar region with neurogenic claudication M48 062        Dear Dr João Fernandez:    Thank you for your recent referral of Tyson Goins  Please review the attached evaluation summary from Derian's recent visit  Please verify that you agree with the plan of care by signing the attached order  If you have any questions or concerns, please do not hesitate to call  I sincerely appreciate the opportunity to share in the care of one of your patients and hope to have another opportunity to work with you in the near future  Sincerely,    Fidelina Douglas, PT      Referring Provider:      I certify that I have read the below Plan of Care and certify the need for these services furnished under this plan of treatment while under my care  Zachariah Eddy MD  [address]  [address] [facility] [address]  VIA Facsimile: [phone]          PT Re-Evaluation       Assessment  Assessment details: Tyson Goins is a 77 y o  male who presents to outpatient PT with a  Spinal stenosis of lumbar region with neurogenic claudication  (primary encounter diagnosis)  Pt is S/P Lumbar fusion at L3/L4, L4/L5, and L5/S1, DOS 5/16  No further referral appears necessary at this time based upon examination results  Pt presents with decreased strength, ROM, functional activity tolerance, and pain with movement in his lumbar spine  Educated patient to avoid vigorous twisting and bending activities  Pt verbalizes understanding  Lumbar spine AROM is limited in all planes, which is  limiting his ability to perform the aforementioned functional activities  Etiologic factors include repetitive poor body mechanics  Prognosis is good given HEP compliance and PT 2/wk    Please contact me if you have any questions or recommendations  Thank you for the opportunity to share in  Derian's care  RA 10/14    Rain Stinson has demonstrated decreased pain, increased strength, increased range of motion, and increased activity tolerance since starting physical therapy services  He reports an overall improvement of 80% thus far  Pt ambulates into PT with no assistive device this date  Lumbar ROM, and B/L LE strength has improved in all planes since [facility]  Advised patient that he should not be on the golf course, until he is cleared by his MD   He continues to present with pain, decreased strength, decreased range of motion, and decreased activity tolerance and would benefit from additional skilled physical therapy interventions to address impairments and maximize function  Impairments: abnormal muscle firing, abnormal or restricted ROM, activity intolerance, difficulty understanding, impaired physical strength, lacks appropriate home exercise program, pain with function, safety issue and poor posture     Symptom irritability: moderateUnderstanding of Dx/Px/POC: good   Prognosis: good    Goals  1  In 4-6 weeks, patient will demonstrate a decrease in pain to 0/10 during functional activities  2  In 4-6 weeks, patient will demonstrate an increase in range of motion by 10 degrees  Met   3  In 4-6 weeks, patient will demonstrate an increase in strength by 1/2 grade on MMT  Met     1  In 6-8 weeks, patient will be independent with their home exercise program  Met   2  In 6-8 weeks, patient will have zero limitations with strength  3  In 6-8 weeks, patient will have zero limitations with ROM  4  In 6-8 weeks, patient will have zero limitations with ambulation  Met   5  In 6-8 weeks, patient will have zero limitations with Return to recreational activities         Plan  Plan details: Patient was provided a home exercise program and demonstrated an understanding of exercises    Patient was advised to stop performing home exercise program if symptoms increase or new complaints developed  Verbal understanding demonstrated regarding home exercise program instructions  Patient would benefit from: skilled physical therapy  Planned therapy interventions: manual therapy, therapeutic exercise and home exercise program  Frequency: 2x week  Duration in weeks: 4  Treatment plan discussed with: patient        Subjective Evaluation    History of Present Illness  Date of surgery: 2019  Mechanism of injury: The patient is a 77year old male who presents to outpatient physical therapy with S/P lumbar fusion at L3/L4, L4/L5, L5/S1  Pt reports he was wearing a LSO,  but was told he did not have to wear it  Pt is unaware of any restrictions at this time  The only orders he has is not to play golf  He reports no limitations with sleeping, driving, ADL's, and IADL's     RA 10/14     The patient reports an improvement of 80 percent since beginning skilled physical therapy services  He recently took three weeks off of outpatient PT, and has been doing aquatic therapy on his own in a pool  He reports he feels much better  He is ambulating with no AD at this time  He reports that he was golfing on the golf course a "short game " He denies any radicular sx into his bilateral LE's at this time     Pain                                            10/14   Current pain rating: 3                      2  At best pain ratin                       0   At worst pain ratin                     7   Quality: sharp and dull ache  Relieving factors: change in position  Aggravating factors: lifting  Progression: improved    Hand dominance: right    Treatments  Current treatment: physical therapy  Patient Goals  Patient goals for therapy: increased strength, increased motion and decreased pain          Objective     Active Range of Motion     Lumbar                                                  RA 10/14   Flexion: 42 degrees                                 32 degrees  Extension: 25 degrees                            25 degrees   Left lateral flexion: 63 degrees                57 degrees           Right lateral flexion: 60 degrees              55 degrees   Left Hip   Flexion: WFL  Abduction: WFL  Adduction: WFL    Right Hip   Flexion: WFL  Abduction: WFL  Adduction: WFL  Left Knee   Flexion: WFL  Extension: WFL    Right Knee   Flexion: WFL  Extension: WFL  Left Ankle/Foot   Dorsiflexion (kf): WFL  Plantar flexion: WFL    Right Ankle/Foot   Dorsiflexion (kf): WFL  Plantar flexion: [facility]    Strength/Myotome Testing     Left Hip                       RA 10/14   Planes of Motion   Flexion: 4-                          4/5   Abduction: 4-                     4/5  Adduction: 4-                     4/5     Right Hip   Planes of Motion   Flexion: 4-                      4/5   Abduction: 4-                 4/5   Adduction: 4-                  4/5     Left Knee   Flexion: 4                         4+/5   Extension: 4                    4+/5     Right Knee   Flexion: 4                        4+/5   Extension: 4                   4+/5     Left Ankle/Foot   Dorsiflexion: 4              4+/5   Plantar flexion: 4          4+/5     Right Ankle/Foot   Dorsiflexion: 4               4+/5   Plantar flexion: 4           4+/5     Additional Strength Details  Decreased sensation to left leg  Surgical incision(s) inspected  Incision(s) clean, dry and intact with no signs/symptoms of infection  Patient was educated on signs/symptoms of infection and was advised to contact MD immediately if suspect infection       Manual  9/16 9/18 9/20 10/14 9/11   B/L Hamstring    x D/C Self hamstring                                10 min         Exercise Diary  9/16 9/18 9/20 10/14 9/11   PPT 5'20x 5" x20 5" x20 5" x20 5" x30   Hip Add with Ball 5''20x  5" x20 5" x20 5" x20 5" x20   Clamshells  5''20x Black TB Black   5" x20 5"  x20 Black TB 5" x20 Black TB 5" x20 Black TB   SLR 3x10 Flex 1 5# Flex 1 5#   3x10 ea   2x10 Flex  0# 2x10 Flex 2#  2x10 Flex 1 5#   Supine Marching         Pball ABS  5''20x 5" x20 5" x20 ea 5" x20 ea 5" x20   Step Ups  "C" 3x10  "B" 3x10 "B" 2x10 LLE    x3 on RLE w/ pain "C" 2x10  "C" 2x10   Mini Squat  3x10 2x10 2x10 ea 2x10 ea 3x10   SLS         HR/TR x30 x30 x30 x30 x30   Nu Step  L4 x10 min L4 x10 min L3 x 10 min  L3 x 10 min  L4 x10 min   DLS ALT UE  Seated on Dynadisc 2x10 ea  Seated on  Dynadisc  2x10ea Seated on low mat 2x10 ea    Held Disc Seated on low mat 2x10 ea Seated on low mat 2x10 ea   DLS ALT LE  Seated on Dynadisk 2x10 ea Seated on   Ros disc  2x10 ea Seat on low mat 2x10 ea    Held Disc Seated on low mat 2x10 ea Seated on low mat 2x10 ea   DLS ALT UE/LE Seated on Dynadisc 2x10 ea Seated on dynadisc  2x10 ea Seat on low mat 2x10 ea    Held disc  Seated on low mat 2x10 ea Seated on low mat 2x10 ea   Marches in // Bars         Quadruped UE/LE        TB MTP/LTP        TB anti-rotation         Self HS 30''3x B/L  30" x3 ea 30" x3 ea 30" x3 30" x3   Leg Press            Modalities

## 2019-10-17 ENCOUNTER — OFFICE VISIT (OUTPATIENT)
Dept: PHYSICAL THERAPY | Facility: CLINIC | Age: 69
End: 2019-10-17
Payer: MEDICARE

## 2019-10-17 DIAGNOSIS — M48.062 SPINAL STENOSIS OF LUMBAR REGION WITH NEUROGENIC CLAUDICATION: Primary | ICD-10-CM

## 2019-10-17 PROCEDURE — 97110 THERAPEUTIC EXERCISES: CPT

## 2019-10-17 NOTE — PROGRESS NOTES
Daily Note     Today's date: 10/17/2019  Patient name: Bree Cruz  : 1950  MRN: [id number]  Referring provider: Magalys Mason MD  Dx:   Encounter Diagnosis     ICD-10-CM    1  Spinal stenosis of lumbar region with neurogenic claudication M48 062                   Subjective: "My back feels very good  My shoulders are bothering me today "      Objective: See treatment diary below      Assessment: Tolerated treatment well  Initiated multiple weighted machines and TB anti-trunk rotations to strengthen BLE and the core  Pt defers other TB exercises secondary to B/L shoulder pain  No exacerbation of symptoms this date  Patient demonstrated fatigue post treatment and would benefit from continued PT      Plan: Continue per plan of care        Manual  9/16 9/18 9/20 10/14 10/17   B/L Hamstring    x D/C Self hamstring  --                              10 min         Exercise Diary  9/16 9/18 9/20 10/14 10/17   PPT 5'20x 5" x20 5" x20 5" x20 --   Hip Add with Ball 5''20x  5" x20 5" x20 5" x20 --   Clamshells  5''20x Black TB Black   5" x20 5"  x20 Black TB 5" x20 Black TB --   SLR 3x10 Flex 1 5# Flex 1 5#   3x10 ea   2x10 Flex  0# 2x10 Flex 2#  2x10 Flex 2#   Supine Marching         Pball ABS  5''20x 5" x20 5" x20 ea 5" x20 ea 5" x20   Step Ups  "C" 3x10  "B" 3x10 "B" 2x10 LLE    x3 on RLE w/ pain "C" 2x10  "C" 3x10   Mini Squat  3x10 2x10 2x10 ea 2x10 ea 2x10 3" hold   SLS         HR/TR x30 x30 x30 x30 x30   Nu Step  L4 x10 min L4 x10 min L3 x 10 min  L3 x 10 min  L4 x10 min   DLS ALT UE  Seated on Dynadisc 2x10 ea  Seated on  Dynadisc  2x10ea Seated on low mat 2x10 ea    Held Disc Seated on low mat 2x10 ea Seated on low mat 2x10 ea   DLS ALT LE  Seated on Dynadisk 2x10 ea Seated on   Ros disc  2x10 ea Seat on low mat 2x10 ea    Held Disc Seated on low mat 2x10 ea Seated on low mat 2x10 ea   DLS ALT UE/LE Seated on Dynadisc 2x10 ea Seated on dynadisc  2x10 ea Seat on low mat 2x10 ea    Held disc  Seated on low mat 2x10 ea Seated on low mat 2x10 ea   TB MTP/LTP     Pt Defers   TB anti-rotation     Green TB 5" x10 ea   Hip ADD machine     100# 2x10    Hip ABD machine     100# 2x10   Self HS 30''3x B/L  30" x3 ea 30" x3 ea 30" x3 30" x3   Leg Press     120# 2x10       Modalities

## 2019-10-18 ENCOUNTER — TRANSCRIBE ORDERS (OUTPATIENT)
Dept: PHYSICAL THERAPY | Facility: CLINIC | Age: 69
End: 2019-10-18

## 2019-10-18 DIAGNOSIS — M48.062 SPINAL STENOSIS OF LUMBAR REGION WITH NEUROGENIC CLAUDICATION: Primary | ICD-10-CM

## 2019-10-21 ENCOUNTER — OFFICE VISIT (OUTPATIENT)
Dept: PHYSICAL THERAPY | Facility: CLINIC | Age: 69
End: 2019-10-21
Payer: MEDICARE

## 2019-10-21 DIAGNOSIS — M48.062 SPINAL STENOSIS OF LUMBAR REGION WITH NEUROGENIC CLAUDICATION: Primary | ICD-10-CM

## 2019-10-21 PROCEDURE — 97110 THERAPEUTIC EXERCISES: CPT

## 2019-10-21 NOTE — PROGRESS NOTES
Daily Note     Today's date: 10/21/2019  Patient name: Michelle Barros  : 1950  MRN: [id number]  Referring provider: Jessenia Loomis MD  Dx:   Encounter Diagnosis     ICD-10-CM    1  Spinal stenosis of lumbar region with neurogenic claudication M48 062                   Subjective: "I feel fine  I had no issues on the weighted machines last visit  I want to try more weight today "      Objective: See treatment diary below      Assessment: Tolerated treatment well  Progressed weight ad reps during multiple exercises to increase strength and endurance of BLE and core muscles  No exacerbation of symptoms  Patient demonstrated fatigue post treatment and would benefit from continued PT      Plan: Continue per plan of care        Manual  10/21 9/18 9/20 10/14 10/17   B/L Hamstring  --  x D/C Self hamstring  --                              10 min         Exercise Diary  10/21 9/18 9/20 10/14 10/17   PPT -- 5" x20 5" x20 5" x20 --   Hip Add with Ball -- 5" x20 5" x20 5" x20 --   Clamshells  -- Black   5" x20 5"  x20 Black TB 5" x20 Black TB --   SLR 2x10 Flex 2# Flex 1 5#   3x10 ea   2x10 Flex  0# 2x10 Flex 2#  2x10 Flex 2#   Supine Marching         Pball ABS  5'' 20x 5" x20 5" x20 ea 5" x20 ea 5" x20   Step Ups  "C" 3x10  "B" 3x10 "B" 2x10 LLE    x3 on RLE w/ pain "C" 2x10  "C" 3x10   Mini Squat  2x10 3" hold 2x10 2x10 ea 2x10 ea 2x10 3" hold   SLS         HR/TR x30 x30 x30 x30 x30   Nu Step  L4 x10 min L4 x10 min L3 x 10 min  L3 x10 min  L4 x10 min   DLS ALT UE  Seated on low mat 2x10 ea Seated on  Dynadisc  2x10ea Seated on low mat 2x10 ea    Held Disc Seated on low mat 2x10 ea Seated on low mat 2x10 ea   DLS ALT LE  Seated on low mat 2x10 ea 2# Seated on   Ros disc  2x10 ea Seat on low mat 2x10 ea    Held Disc Seated on low mat 2x10 ea Seated on low mat 2x10 ea   DLS ALT UE/LE Seated on low mat 2x10 ea 2# LE Seated on dynadisc  2x10 ea Seat on low mat 2x10 ea    Held disc  Seated on low mat 2x10 ea Seated on low mat 2x10 ea   TB MTP/LTP     Pt Defers   TB anti-rotation Green TB 5" x10 ea    Green TB 5" x10 ea   Hip ADD machine 100# 3x10    100# 2x10    Hip ABD machine 100# 3x10    100# 2x10   Self HS 30''3x B/L  30" x3 ea 30" x3 ea 30" x3 30" x3   Leg Press 140# 3x10    120# 2x10       Modalities

## 2019-10-23 ENCOUNTER — OFFICE VISIT (OUTPATIENT)
Dept: PHYSICAL THERAPY | Facility: CLINIC | Age: 69
End: 2019-10-23
Payer: MEDICARE

## 2019-10-23 DIAGNOSIS — M48.062 SPINAL STENOSIS OF LUMBAR REGION WITH NEUROGENIC CLAUDICATION: Primary | ICD-10-CM

## 2019-10-23 PROCEDURE — 97110 THERAPEUTIC EXERCISES: CPT

## 2019-10-23 NOTE — PROGRESS NOTES
Daily Note     Today's date: 10/23/2019  Patient name: Sapna Postal  : 1950  MRN: [id number]  Referring provider: Kimberly Taylor MD  Dx:   Encounter Diagnosis     ICD-10-CM    1  Spinal stenosis of lumbar region with neurogenic claudication M48 062                   Subjective: Pt reports LB is feeling good  Objective: See treatment diary below      Assessment: Tolerated treatment well  Patient exhibited good technique with therapeutic exercises and would benefit from continued PT      Plan: Continue per plan of care        Manual  10/21 10/23 9/20 10/14 10/17   B/L Hamstring  -- --- x D/C Self hamstring  --                              10 min         Exercise Diary  10/21 10/23 9/20 10/14 10/17   PPT -- ---- 5" x20 5" x20 --   Hip Add with Ball -- ---- 5" x20 5" x20 --   Clamshells  -- --- 5"  x20 Black TB 5" x20 Black TB --   SLR 2x10 Flex 2# 2#   2x10 flex 2x10 Flex  0# 2x10 Flex 2#  2x10 Flex 2#   Supine Marching         Pball ABS  5'' 20x Red ball  5" x20 ea 5" x20 ea 5" x20 ea 5" x20   Step Ups  "C" 3x10  "C" 3x10 "B" 2x10 LLE    x3 on RLE w/ pain "C" 2x10  "C" 3x10   Mini Squat  2x10 3" hold 3" 2x10 2x10 ea 2x10 ea 2x10 3" hold   SLS         HR/TR x30 x30 x30 x30 x30   Nu Step  L4 x10 min L4 x10 min L3 x 10 min  L3 x10 min  L4 x10 min   DLS ALT UE  Seated on low mat 2x10 ea Seated on low mat  2x10 ea Seated on low mat 2x10 ea    Held Disc Seated on low mat 2x10 ea Seated on low mat 2x10 ea   DLS ALT LE  Seated on low mat 2x10 ea 2# seated on low mat  2# 2x10 ea Seat on low mat 2x10 ea    Held Disc Seated on low mat 2x10 ea Seated on low mat 2x10 ea   DLS ALT UE/LE Seated on low mat 2x10 ea 2# LE Seated on low mat   2x10 ea Seat on low mat 2x10 ea    Held disc  Seated on low mat 2x10 ea Seated on low mat 2x10 ea   TB MTP/LTP     Pt Defers   TB anti-rotation Green TB 5" x10 ea Green TB  5" x10   Green TB 5" x10 ea   Hip ADD machine 100# 3x10 100# 3x10   100# 2x10    Hip ABD machine 100# 3x10 100# 3x10   100# 2x10   Self HS 30''3x B/L  30" x3 B/L 30" x3 ea 30" x3 30" x3   Leg Press 140# 3x10 140# 3x10   120# 2x10       Modalities

## 2019-10-27 ENCOUNTER — OFFICE VISIT (OUTPATIENT)
Dept: URGENT CARE | Facility: CLINIC | Age: 69
End: 2019-10-27
Payer: MEDICARE

## 2019-10-27 VITALS
OXYGEN SATURATION: 97 % | TEMPERATURE: 97.9 F | RESPIRATION RATE: 16 BRPM | HEART RATE: 93 BPM | SYSTOLIC BLOOD PRESSURE: 145 MMHG | DIASTOLIC BLOOD PRESSURE: 80 MMHG

## 2019-10-27 DIAGNOSIS — H10.33 ACUTE BACTERIAL CONJUNCTIVITIS OF BOTH EYES: Primary | ICD-10-CM

## 2019-10-27 PROCEDURE — G0463 HOSPITAL OUTPT CLINIC VISIT: HCPCS | Performed by: NURSE PRACTITIONER

## 2019-10-27 PROCEDURE — 99213 OFFICE O/P EST LOW 20 MIN: CPT | Performed by: NURSE PRACTITIONER

## 2019-10-27 RX ORDER — TOBRAMYCIN 3 MG/ML
1 SOLUTION/ DROPS OPHTHALMIC
Qty: 5 ML | Refills: 0 | Status: SHIPPED | OUTPATIENT
Start: 2019-10-27 | End: 2019-11-01

## 2019-10-27 NOTE — PROGRESS NOTES
[facility]        NAME: Jessika Morillo is a 77 y o  male  : 1950    MRN: [id number]  DATE: 2019  TIME: 3:22 PM    Assessment and Plan   Acute bacterial conjunctivitis of both eyes [H10 33]  1  Acute bacterial conjunctivitis of both eyes  tobramycin (TOBREX) 0 3 % SOLN         Patient Instructions     Patient Instructions     Change your pillow case tonight and tomorrow night  Use a clean cloth each time you wipe your eye  Practice careful handwashing, especially for the first 24 hours of treatment  For the first 24 hours, it is alright to use the drops every 2 hours while awake; after that resume the every 4 hours while awake frequency  Conjunctivitis   AMBULATORY CARE:   Conjunctivitis,  or pink eye, is inflammation of your conjunctiva  The conjunctiva is a thin tissue that covers the front of your eye and the back of your eyelids  The conjunctiva helps protect your eye and keep it moist  Conjunctivitis may be caused by bacteria, allergies, or a virus  If your conjunctivitis is caused by bacteria, it may get better on its own in about 7 days  Viral conjunctivitis can last up to 3 weeks  Common symptoms may include any of the following: You will usually have symptoms in both eyes if your conjunctivitis is caused by allergies  You may also have other allergic symptoms, such as a rash or runny nose  Symptoms will usually start in 1 eye if your conjunctivitis is caused by a virus or bacteria  · Redness in the whites of your eye    · Itching in your eye or around your eye    · Feeling like there is something in your eye    · Watery or thick, sticky discharge    · Crusty eyelids when you wake up in the morning    · Burning, stinging, or swelling in your eye    · Pain when you see bright light  Seek care immediately if:   · You have worsening eye pain  · The swelling in your eye gets worse, even after treatment       · Your vision suddenly becomes worse or you cannot see at all   Contact your healthcare provider if:   · You develop a fever and ear pain  · You have tiny bumps or spots of blood on your eye  · You have questions or concerns about your condition or care  Treatment  will depend on the cause of your conjunctivitis  You may need antibiotics or allergy medicine as a pill, eye drop, or eye ointment  Manage your symptoms:   · Apply a cool compress  Wet a washcloth with cold water and place it on your eye  This will help decrease itching and irritation  · Do not wear contact lenses  They can irritate your eye  Throw away the pair you are using and ask when you can wear them again  Use a new pair of lenses when your healthcare provider says it is okay  · Avoid irritants  Stay away from smoke filled areas  Shield your eyes from wind and sun  · Flush your eye  You may need to flush your eye with saline to help decrease your symptoms  Ask for more information on how to flush your eye  Medicines:  Treatment depends on what is causing your conjunctivitis  You may be given any of the following:  · Allergy medicine  helps decrease itchy, red, swollen eyes caused by allergies  It may be given as a pill, eye drops, or nasal spray  · Antibiotics  may be needed if your conjunctivitis is caused by bacteria  This medicine may be given as a pill, eye drops, or eye ointment  · Take your medicine as directed  Contact your healthcare provider if you think your medicine is not helping or if you have side effects  Tell him or her if you are allergic to any medicine  Keep a list of the medicines, vitamins, and herbs you take  Include the amounts, and when and why you take them  Bring the list or the pill bottles to follow-up visits  Carry your medicine list with you in case of an emergency  Prevent the spread of conjunctivitis:   · Wash your hands with soap and water often  Wash your hands before and after you touch your eyes   Also wash your hands before you prepare or eat food and after you use the bathroom or change a diaper  · Avoid allergens  Try to avoid the things that cause your allergies, such as pets, dust, or grass  · Avoid contact with others  Do not share towels or washcloths  Try to stay away from others as much as possible  Ask when you can return to work or school  · Throw away eye makeup  The bacteria that caused your conjunctivitis can stay in eye makeup  Throw away mascara and other eye makeup  © 2017 [facility] Information is for End User's use only and may not be sold, redistributed or otherwise used for commercial purposes  All illustrations and images included in CareNotes® are the copyrighted property of [employer]  or [employer]  The above information is an  only  It is not intended as medical advice for individual conditions or treatments  Talk to your doctor, nurse or pharmacist before following any medical regimen to see if it is safe and effective for you  Follow up with PCP in 3-5 days  Proceed to  ER if symptoms worsen  Chief Complaint     Chief Complaint   Patient presents with    Eye Drainage     Pt c/o eye drainage for three days  History of Present Illness       Patient reports eye drainage x3 days, purulent at times, crusted upon awakening, itchy  No known sick contacts      Review of Systems   Review of Systems   Eyes: Positive for discharge, redness and itching  Negative for photophobia, pain and visual disturbance  All other systems reviewed and are negative          Current Medications       Current Outpatient Medications:     acetaminophen (TYLENOL) 500 mg tablet, Take 500-1,000 mg by mouth every 6 (six) hours as needed for mild pain, Disp: , Rfl:     gabapentin (NEURONTIN) 300 mg capsule, Take 300 mg by mouth daily at bedtime  , Disp: , Rfl:     lisinopril (ZESTRIL) 20 mg tablet, Take 20 mg by mouth daily at bedtime, Disp: , Rfl:     Melatonin 10 MG TABS, Take 30 mg by mouth daily at bedtime , Disp: , Rfl:     NON FORMULARY, Medical marijuana- 1-2 caps daily, Disp: , Rfl:     pramipexole (MIRAPEX) 0 5 mg tablet, Take 0 5 mg by mouth daily at bedtime  , Disp: , Rfl:     ranitidine (ZANTAC) 150 MG capsule, Take 150 mg by mouth every evening dinner, Disp: , Rfl:     rosuvastatin (CRESTOR) 20 MG tablet, Take 20 mg by mouth every evening, Disp: , Rfl:     tiZANidine (ZANAFLEX) 4 mg tablet, Take 1 tablet (4 mg total) by mouth every 8 (eight) hours as needed for muscle spasms, Disp: 60 tablet, Rfl: 0    tobramycin (TOBREX) 0 3 % SOLN, Administer 1 drop to both eyes every 4 (four) hours while awake for 5 days, Disp: 5 mL, Rfl: 0    zolpidem (AMBIEN) 5 mg tablet, Take 5 mg by mouth daily at bedtime as needed for sleep, Disp: , Rfl:     Current Allergies     Allergies as of 10/27/2019 - Reviewed 10/27/2019   Allergen Reaction Noted    Tramadol Rash 04/29/2019            The following portions of the patient's history were reviewed and updated as appropriate: allergies, current medications, past family history, past medical history, past social history, past surgical history and problem list      Past Medical History:   Diagnosis Date    Anemia     Anesthesia complication     woke up during surgery-orthopedic surgery LLE    Anxiety     Arthritis     b/l knees, hips, neck    Bee sting allergy     CPAP (continuous positive airway pressure) dependence     DDD (degenerative disc disease), lumbar     Degenerative disc disease, cervical     Depression     GERD (gastroesophageal reflux disease)     Headache     history of silent migraines    History of deep vein thrombosis (DVT) of lower extremity     LLE    Hyperlipidemia     Hypertension     Knee pain, bilateral     Lumbar herniated disc     sciatica on right    Myoclonic jerking while sleeping     Pneumonia     x3     Sleep apnea     Spinal stenosis, lumbar region, with neurogenic claudication  TIA (transient ischemic attack) 04/19/2018    No residual problems    Tremor     Etiology unknown  Affects "whole body"    Use of cane as ambulatory aid     Wears dentures     full upper    Wears glasses        Past Surgical History:   Procedure Laterality Date    BACK SURGERY  1987     laminectomy    COLONOSCOPY      FRACTURE SURGERY Left     L tib/fib fx    HERNIA REPAIR      INGUINAL HERNIA REPAIR      JOINT REPLACEMENT Left     TKR    KNEE ARTHROSCOPY Left     LUMBAR FUSION N/A 5/16/2019    Procedure: MIS L5/S1 PLIF; PERCUTANEOUS INSTRUMENTATION L3-S1; PSF L5/S1;  LAMINECTOMY L$/5, L5/S1;  Surgeon: Lula Gibson MD;  [facility];  Service: Orthopedics    LUMBAR FUSION N/A 5/16/2019    Procedure: TRANSPSOAS ALIF L3/4, L4/5;  Surgeon: Lula Gibson MD;  [facility];  Service: Orthopedics    [address] [place] [address] N/A 7/25/2017    Procedure: PARTIAL INFERIOR TURBINATE REDUCTION WITH OUTFRACTURE;  Surgeon: Ralph Menezes DO;  [facility];  Service: ENT    MO NASAL/SINUS ENDOSCOPY,RMV TISS MAXILL SINUS N/A 8/28/2018    Procedure: LEFT MAXILLARY SINUS ANTROSOTOMY FUNCTIONAL ENDOSCOPIC SINUS SURGERY;  Surgeon: Ralph Menezes DO;  [facility];  Service: ENT    MO REPAIR OF NASAL SEPTUM N/A 7/25/2017    Procedure: SEPTOPLASTY WITH POSSIBLE RECONSTRUCTION;  Surgeon: Ralph Menezes DO;  [facility];  Service: ENT    TOTAL KNEE ARTHROPLASTY Left 2/9/2018    Procedure: ARTHROPLASTY KNEE TOTAL;  Surgeon: Belgica Romo MD;  [facility];  Service: Orthopedics    UMBILICAL HERNIA REPAIR         No family history on file  Medications have been verified  Objective   /80   Pulse 93   Temp 97 9 °F (36 6 °C)   Resp 16   SpO2 97%        Physical Exam     Physical Exam   Constitutional: He is oriented to person, place, and time  He appears well-developed and well-nourished  No distress     HENT:   Head: Normocephalic and atraumatic  Eyes: Pupils are equal, round, and reactive to light  EOM and lids are normal  Right eye exhibits chemosis and exudate  Right eye exhibits no hordeolum  No foreign body present in the right eye  Left eye exhibits chemosis and exudate  Left eye exhibits no hordeolum  No foreign body present in the left eye  Right conjunctiva is injected  Right conjunctiva has no hemorrhage  Left conjunctiva is injected  Left conjunctiva has no hemorrhage  No scleral icterus  Both eyes watery  Small amount of purulent drainage noted in the corners  Neurological: He is alert and oriented to person, place, and time  Skin: Skin is warm and dry  Capillary refill takes less than 2 seconds  He is not diaphoretic  Psychiatric: He has a normal mood and affect  His behavior is normal  Judgment and thought content normal    Nursing note and vitals reviewed

## 2019-10-27 NOTE — PATIENT INSTRUCTIONS
Change your pillow case tonight and tomorrow night  Use a clean cloth each time you wipe your eye  Practice careful handwashing, especially for the first 24 hours of treatment  For the first 24 hours, it is alright to use the drops every 2 hours while awake; after that resume the every 4 hours while awake frequency  Conjunctivitis   AMBULATORY CARE:   Conjunctivitis,  or pink eye, is inflammation of your conjunctiva  The conjunctiva is a thin tissue that covers the front of your eye and the back of your eyelids  The conjunctiva helps protect your eye and keep it moist  Conjunctivitis may be caused by bacteria, allergies, or a virus  If your conjunctivitis is caused by bacteria, it may get better on its own in about 7 days  Viral conjunctivitis can last up to 3 weeks  Common symptoms may include any of the following: You will usually have symptoms in both eyes if your conjunctivitis is caused by allergies  You may also have other allergic symptoms, such as a rash or runny nose  Symptoms will usually start in 1 eye if your conjunctivitis is caused by a virus or bacteria  · Redness in the whites of your eye    · Itching in your eye or around your eye    · Feeling like there is something in your eye    · Watery or thick, sticky discharge    · Crusty eyelids when you wake up in the morning    · Burning, stinging, or swelling in your eye    · Pain when you see bright light  Seek care immediately if:   · You have worsening eye pain  · The swelling in your eye gets worse, even after treatment  · Your vision suddenly becomes worse or you cannot see at all  Contact your healthcare provider if:   · You develop a fever and ear pain  · You have tiny bumps or spots of blood on your eye  · You have questions or concerns about your condition or care  Treatment  will depend on the cause of your conjunctivitis  You may need antibiotics or allergy medicine as a pill, eye drop, or eye ointment    Manage your symptoms:   · Apply a cool compress  Wet a washcloth with cold water and place it on your eye  This will help decrease itching and irritation  · Do not wear contact lenses  They can irritate your eye  Throw away the pair you are using and ask when you can wear them again  Use a new pair of lenses when your healthcare provider says it is okay  · Avoid irritants  Stay away from smoke filled areas  Shield your eyes from wind and sun  · Flush your eye  You may need to flush your eye with saline to help decrease your symptoms  Ask for more information on how to flush your eye  Medicines:  Treatment depends on what is causing your conjunctivitis  You may be given any of the following:  · Allergy medicine  helps decrease itchy, red, swollen eyes caused by allergies  It may be given as a pill, eye drops, or nasal spray  · Antibiotics  may be needed if your conjunctivitis is caused by bacteria  This medicine may be given as a pill, eye drops, or eye ointment  · Take your medicine as directed  Contact your healthcare provider if you think your medicine is not helping or if you have side effects  Tell him or her if you are allergic to any medicine  Keep a list of the medicines, vitamins, and herbs you take  Include the amounts, and when and why you take them  Bring the list or the pill bottles to follow-up visits  Carry your medicine list with you in case of an emergency  Prevent the spread of conjunctivitis:   · Wash your hands with soap and water often  Wash your hands before and after you touch your eyes  Also wash your hands before you prepare or eat food and after you use the bathroom or change a diaper  · Avoid allergens  Try to avoid the things that cause your allergies, such as pets, dust, or grass  · Avoid contact with others  Do not share towels or washcloths  Try to stay away from others as much as possible  Ask when you can return to work or school       · Throw away eye makeup  The bacteria that caused your conjunctivitis can stay in eye makeup  Throw away mascara and other eye makeup  © 2017 [address]  Information is for End User's use only and may not be sold, redistributed or otherwise used for commercial purposes  All illustrations and images included in CareNotes® are the copyrighted property of [employer]  or [employer]  The above information is an  only  It is not intended as medical advice for individual conditions or treatments  Talk to your doctor, nurse or pharmacist before following any medical regimen to see if it is safe and effective for you

## 2019-10-30 ENCOUNTER — OFFICE VISIT (OUTPATIENT)
Dept: PHYSICAL THERAPY | Facility: CLINIC | Age: 69
End: 2019-10-30
Payer: MEDICARE

## 2019-10-30 DIAGNOSIS — M48.062 SPINAL STENOSIS OF LUMBAR REGION WITH NEUROGENIC CLAUDICATION: Primary | ICD-10-CM

## 2019-10-30 PROCEDURE — 97110 THERAPEUTIC EXERCISES: CPT

## 2019-10-30 NOTE — PROGRESS NOTES
Daily Note     Today's date: 10/30/2019  Patient name: Nicci Flores  : 1950  MRN: [id number]  Referring provider: Kari Bourne MD  Dx:   Encounter Diagnosis     ICD-10-CM    1  Spinal stenosis of lumbar region with neurogenic claudication M48 062        Start Time:   Stop Time: 8693  Total time in clinic (min): 40 minutes    Subjective: "I played 9 holes of golf last week and had no pain in my back  Just R shoulder pain "      Objective: See treatment diary below      Assessment: Tolerated treatment well  Performed a standing HS stretch this date  Progressed cuff weights to all DLS and SLR exercises  No exacerbation of LBP  Patient demonstrated fatigue post treatment and would benefit from continued PT      Plan: Continue per plan of care        Manual  10/21 10/23 10/30 10/14 10/17   B/L Hamstring  -- --- --- D/C Self hamstring  --                                       Exercise Diary  10/21 10/23 10/30 10/14 10/17   PPT -- ---- --- 5" x20 --   Hip Add with Ball -- ---- --- 5" x20 --   Clamshells  -- --- -- 5" x20 Black TB --   SLR 2x10 Flex 2# 2#   2x10 flex 2x10 Flex  3# 2x10 Flex 2#  2x10 Flex 2#   Supine Marching         Pball ABS  5'' 20x Red ball  5" x20 ea Red ball  5" x20 ea 5" x20 ea 5" x20   Step Ups  "C" 3x10  "C" 3x10 "C" 3x10 "C" 2x10  "C" 3x10   Mini Squat  2x10 3" hold 3" 2x10 3" 2x10 ea 2x10 ea 2x10 3" hold   SLS         HR/TR x30 x30 x30 x30 x30   NuStep  L4 x10 min L4 x10 min L4 x 10 min L3 x10 min  L4 x10 min   DLS ALT UE  Seated on low mat 2x10 ea Seated on low mat  2x10 ea Seated on low mat 2x10 ea 3# Seated on low mat 2x10 ea Seated on low mat 2x10 ea   DLS ALT LE  Seated on low mat 2x10 ea 2# seated on low mat  2# 2x10 ea Seat on low mat 2x10 ea 3# Seated on low mat 2x10 ea Seated on low mat 2x10 ea   DLS ALT UE/LE Seated on low mat 2x10 ea 2# LE Seated on low mat   2x10 ea Seat on low mat 2x10 ea 3# LE/UE Seated on low mat 2x10 ea Seated on low mat 2x10 ea   TB MTP/LTP     Pt Defers   TB anti-rotation Green TB 5" x10 ea Green TB  5" x10 Green TB  5" x10  Green TB 5" x10 ea   Hip ADD machine 100# 3x10 100# 3x10 100# 3x10  100# 2x10    Hip ABD machine 100# 3x10 100# 3x10 100# 3x10  100# 2x10   Self HS 30''3x B/L  30" x3 B/L 30" x3 ea Standing at staircase 30" x3 30" x3   Leg Press 140# 3x10 140# 3x10 140# 3x10  120# 2x10       Modalities

## 2019-11-01 ENCOUNTER — OFFICE VISIT (OUTPATIENT)
Dept: PHYSICAL THERAPY | Facility: CLINIC | Age: 69
End: 2019-11-01
Payer: MEDICARE

## 2019-11-01 DIAGNOSIS — M48.062 SPINAL STENOSIS OF LUMBAR REGION WITH NEUROGENIC CLAUDICATION: Primary | ICD-10-CM

## 2019-11-01 PROCEDURE — 97110 THERAPEUTIC EXERCISES: CPT | Performed by: PHYSICAL THERAPIST

## 2019-11-01 NOTE — PROGRESS NOTES
Daily Note     Today's date: 2019  Patient name: Mesha Fuentes  : 1950  MRN: [id number]  Referring provider: Catie Dallas MD  Dx:   Encounter Diagnosis     ICD-10-CM    1  Spinal stenosis of lumbar region with neurogenic claudication M48 062                   Subjective: " I feel ok today, I played 9 holes yesterday "     Objective: See treatment diary below      Assessment: Tolerated treatment well  Patient exhibited good technique with therapeutic exercises and would benefit from continued PT  Plan: Continue per plan of care        Manual  10/21 10/23 10/30 11/1  10/17   B/L Hamstring  -- --- --- D/C Self hamstring  --                                       Exercise Diary  10/21 10/23 10/30 11/1  10/17   PPT -- ---- --- -- --   Hip Add with Ekaterina Binning -- ---- --- -- --   Clamshells  -- --- -- --  --   SLR 2x10 Flex 2# 2#   2x10 flex 2x10 Flex  3# 2x10 Flex 3#  2x10 Flex 2#   Supine Marching         Pball ABS  5'' 20x Red ball  5" x20 ea Red ball  5" x20 ea 5" x20 ea  Red Ball  5" x20   Step Ups  "C" 3x10  "C" 3x10 "C" 3x10 "C" 2x10  "C" 3x10   Mini Squat  2x10 3" hold 3" 2x10 3" 2x10 ea 2x10 ea 2x10 3" hold   SLS         HR/TR x30 x30 x30 x30 x30   NuStep  L4 x10 min L4 x10 min L4 x 10 min L3 x10 min  L4 x10 min   DLS ALT UE  Seated on low mat 2x10 ea Seated on low mat  2x10 ea Seated on low mat 2x10 ea 3# Seated on low mat 2x10 ea 3# Seated on low mat 2x10 ea   DLS ALT LE  Seated on low mat 2x10 ea 2# seated on low mat  2# 2x10 ea Seat on low mat 2x10 ea 3# Seated on low mat 2x10 ea 3#  Seated on low mat 2x10 ea   DLS ALT UE/LE Seated on low mat 2x10 ea 2# LE Seated on low mat   2x10 ea Seat on low mat 2x10 ea 3# LE/UE Seated on low mat 2x10 ea 3#  Seated on low mat 2x10 ea   TB MTP/LTP     Pt Defers   TB anti-rotation Green TB 5" x10 ea Green TB  5" x10 Green TB  5" x10 Green TB  5''x10  Green TB 5" x10 ea   Hip ADD machine 100# 3x10 100# 3x10 100# 3x10 100# 3x10  100# 2x10    Hip ABD machine 100# 3x10 100# 3x10 100# 3x10 100# 3x10  100# 2x10   Self HS 30''3x B/L  30" x3 B/L 30" x3 ea Standing at staircase 30" x3 30" x3   Leg Press 140# 3x10 140# 3x10 140# 3x10 140 3x10  120# 2x10       Modalities

## 2019-11-06 ENCOUNTER — OFFICE VISIT (OUTPATIENT)
Dept: PHYSICAL THERAPY | Facility: CLINIC | Age: 69
End: 2019-11-06
Payer: MEDICARE

## 2019-11-06 DIAGNOSIS — M48.062 SPINAL STENOSIS OF LUMBAR REGION WITH NEUROGENIC CLAUDICATION: Primary | ICD-10-CM

## 2019-11-06 PROCEDURE — 97110 THERAPEUTIC EXERCISES: CPT

## 2019-11-06 NOTE — PROGRESS NOTES
Daily Note     Today's date: 2019  Patient name: Tyson Goins  : 1950  MRN: [id number]  Referring provider: Dayne Dsouza MD  Dx:   Encounter Diagnosis     ICD-10-CM    1  Spinal stenosis of lumbar region with neurogenic claudication M48 062                   Subjective: "I feel fine "      Objective: See treatment diary below      Assessment: Tolerated treatment well  Increased weight on multiple weighted machines and progressed TB resistance to strengthen BLE and the core muscles  Patient demonstrated fatigue post treatment and exhibited good technique with therapeutic exercises      Plan: Continue per plan of care  Progress note during next visit        Manual  10/21 10/23 10/30 11/1  11/6   B/L Hamstring  -- --- --- D/C Self hamstring                                         Exercise Diary  10/21 10/23 10/30 11/1  11/6   PPT -- ---- --- --    Hip Add with Ball -- ---- --- --    Clamshells  -- --- -- --     SLR 2x10 Flex 2# 2#   2x10 flex 2x10 Flex  3# 2x10 Flex 3#  2x10 Flex 3#   Supine Marching         Pball ABS  5'' 20x Red ball  5" x20 ea Red ball  5" x20 ea 5" x20 ea  Red Ball  5" x20 Red Ball    Step Ups  "C" 3x10  "C" 3x10 "C" 3x10 "C" 2x10  "C" 3x10   Mini Squat  2x10 3" hold 3" 2x10 3" 2x10 ea 2x10 ea 2x10 3" hold   SLS         HR/TR x30 x30 x30 x30 x30   NuStep  L4 x10 min L4 x10 min L4 x 10 min L3 x10 min  L4 x10 min   DLS ALT UE  Seated on low mat 2x10 ea Seated on low mat  2x10 ea Seated on low mat 2x10 ea 3# Seated on low mat 2x10 ea 3# Seated on low mat 2x10 ea 3#   DLS ALT LE  Seated on low mat 2x10 ea 2# seated on low mat  2# 2x10 ea Seat on low mat 2x10 ea 3# Seated on low mat 2x10 ea 3#  Seated on low mat 2x10 ea 3#   DLS ALT UE/LE Seated on low mat 2x10 ea 2# LE Seated on low mat   2x10 ea Seat on low mat 2x10 ea 3# LE/UE Seated on low mat 2x10 ea 3#  Seated on low mat 2x10 ea 3# LE/UE   TB MTP/LTP        TB anti-rotation Green TB 5" x10 ea Green TB  5" x10 Green TB  5" x10 [employer] Physical Therapy Evaluation    Visit Count: 1  Plan of Care Dates: Initial: 7/9/2018 Through: 9/17/2018  Insurance Information: MEDICARE/PARTA AND B  ID #: [id number]  Eff Date: 1/1/2013 (calendar policy)     $ Limit: $2010 per calendar year            - $ Used: $2895.01  Auth required: No     Ded: $183 - $0 Remaining  Pays at: 80%  OOP: NONE     Secondary Insurance?:ANTHEM/BCBS/[id number]  Insurance Verified: 7/9/18 at 10:18 AM by [id number] via Medicare/[employer]! Website    Next Referring Provider Visit: as needed     Referred by: Wyatt Rincon MD  Medical Diagnosis (from order):  Right shoulder pain   Treatment Diagnosis: Shoulder Symptoms with Pain, Impaired Joint Mobility, Impaired Range of Motion and Impaired Motor Function/Muscle Performance  Insurance: 1. MEDICARE  2. [employer]    Date of Surgery: 1/25/18; Surgery performed: R RCR ; Physician Guidelines: yes  Diagnosis Precautions: per Md protocol   Chart reviewed: Relevant co-morbidities, allergies, tests and medications:   Musculoskeletal   Disorders of sacrum   Endocrine   Unspecified hypothyroidism   Other   Rheumatoid arthritis(714.0)       SUBJECTIVE   Description of Problem/Mechanism of Injury: Pt reports gradual decline in shoulder function last year which began to limit her functional mobility on R. Hx of previous R RCR approx 15 years prior. On 1/25/18 pt underwent redo R RCR w dermal jacket graft.. In sling 6 weeks followed by PT. Last PT session was on 6/7/18. Saw Md on 6/13/18 who recommended continued PT to address functional strength deficits. Has been performing HEP 3x a week. Having continued difficulty reaching objects above head height and out to her side. Pt is a retired dentist. Hx of L shoulder multiple part fracture with limited mobility.   Pain:  Intensity: Now: 3/10; Best: 3/10; Worst: 3/10 (in the last 2 weeks)  Location: anterior lateral R shoulder   Quality/Description: Sore, Stiff  Relieving/Alleviating factors: rest,  TB  5''x10  Blue TB 5" x10 ea   Hip ADD machine 100# 3x10 100# 3x10 100# 3x10 100# 3x10  120# 3x10    Hip ABD machine 100# 3x10 100# 3x10 100# 3x10 100# 3x10  120# 3x10   Self HS 30''3x B/L  30" x3 B/L 30" x3 ea Standing at staircase 30" x3 30" x3 ea Standing at staircase   Leg Press 140# 3x10 140# 3x10 140# 3x10 140 3x10  140# 3x10       Modalities avoiding movement in the involved area  Radiating Pain: denies   Since onset, symptoms are: better    Function:  Limitations and exacerbating factors (patient reported): difficulty with grooming/hygiene, house/yard work , lifting/carrying  Prior level (patient reported): independent with all activities of daily living and instrumental activities of daily living    Prior Treatment: outpatient physical therapy in the past year for current condition. Hospitalization, home health services or skilled nursing facility in the last 30 days: No, per patient.    Home Environment/Social Support: Patient lives with significant other.  Patient has consistent assist from family/friends.      Safety:  Do you feel safe at home, work and/or school? yes, per patient  Falls: balance history in last year (< or equal to 2 falls/near falls and/or reasons) does not indicate further testing    Patient Goals/Concerns:  Perform ADLs, self cares and play golf     OBJECTIVE   Hand Dominance: right    Posture/Observation:  Mild scapular winging on R w round shoulder position compared to L     Range of Motion (degrees)   Norm Left Right   Shoulder                    Date  Initial Initial   Flexion 170-180  165 passive   Extension 50-60  45   Abduction 170-180  135   Adduction 50-75  50   Internal Rotation    To L3   External  Rotation 80-90  40@0abd  65@45abd  80@90abd    standard testing positions unless otherwise noted; Key: ranges are reported in active range of motion unless noted as AA=active assistive or P=passive range of motion, * denotes pain   Comments: Only those motions that were assessed are noted.    Scapular Assessment Left Right   Resting Position  anterior tilt, downward rotation   Scapulohumeral Rhythm  decreased scapular motion   Comments: ; * denotes pain     Strength (out of 5)   Left Right   Date Initial Initial   Shoulder Flexion  3+   Shoulder Extension  4-   Shoulder Abduction  3+   Shoulder Adduction  4-    Shoulder Internal Rotation  4-   Shoulder External Rotation  3-   Elbow Flexion  4   Elbow Extension  4   standard testing positions unless otherwise noted,* denotes pain  Comments: Only muscle strength that was assessed are noted.      Muscle Flexibility:  Pectoralis Minor - Right: minimal tightness  Scalenes - Right: minimal tightness  Upper Trapezius - Right: minimal restriction     Palpation:  Min TTP tight R pec minor and lat region     Joint Play Assessment:  Mild Hypo R GH joint     Special Tests:  Drop Arm Test: Negative bilaterally for tear in rotator complex  Belly press negative bilaterally     Outcome Measures:   Disabilities of the Arm, Shoulder and Hand (DASH): 55.8 (scored 0-100; a higher score indicates greater disability)     Initial Treatment   Initial evaluation completed.    Therapeutic Exercise:   Exercise Repetitions Sets Position/Cues   Stretching ER 0,45,90 supine with wand 3 x :30  Gentle    Cassi for FF ROM  10     sidelying ER  10  3 lbs w towel    Prone row 10  3 lbs    Prone H abd  10  2 lbs    Standing IR 10  Orange t-band w towel    Standing ER  10  Walk out   cuing for end range    Standing Row  10  Orange t-band    Standing FF/abd  10  No wt    Comments:      Neuromuscular Reeducation:  rhythmic stabilization into functional movement pattern FF/abd closed and open chain positions.  Towel slides into FF and ABD with added taps at end range  Ball to wall CW/CCW   Cuing to reduce scapular winging position.        Home Program: 4-5x a week 3 sets of 10 reps   Stretching ER 0,45,90 supine with wand   Pulley for FF ROM    sidelying ER    Prone row   Prone H abd    Standing IR   Standing ER    Standing Row    Standing FF/abd          Plan for next session: see in 2-3 weeks to advance neuromuscular control, strength and HEP as appropriate.     ASSESSMENT   72 year old female presents to therapy with significant decline in prior level of function due to signs and symptoms consistent with  large RCR R with jacket graft. She continues to demonstrate a functional deficit in overhead and reaching activity making self cares and household activities difficult. She is unable to put items on a shelf at home or wash windows or walls.  A KX modifier has been added to her charges.     Outcome:    Benefit from skilled therapy: yes   Rehab potential is good due to positive factors motivation level and negative factors multiple surgeries  Predicted patient presentation: Low (stable) - Patient comorbidities and complexities, as defined above, will have little effect on progress for prescribed plan of care.     Plan of care to increase strength/stability, increase range of motion, decrease pain, improve muscle coordination, improve joint mobility, improve activity tolerance to address functional limitations listed above.    Goals:  To be obtained by end of this plan of care:  1. Patient independent with modified and progressed home exercise program.  2. Patient will decrease involved shoulder pain/symptoms to 1/10  to aid in normalization of upper extremity movements to aid activities of independent daily living.   3. Patient will increase involved shoulder active range of motion to WNL° to aid in normalization of upper extremity movements to aid activities of independent daily living.   4. Patient will increase involved shoulder strength to within functional limits to aid in tolerating 30 minutes of upper extremity activity to cook/eat a meal, completion of household tasks for independent living.  5. Patient will demonstrate minimally impaired involved scapulohumeral rhythm to aid in normalization of upper extremity movements to aid activities of independent daily living.    PLAN   Frequency/Duration: 1 times per week for 8 weeks with tapering as the patient progresses  Skilled training and instruction for the following interventions:  Manual Therapy (88173)  Neuromuscular Re-Education (51294)  Therapeutic Activity  (26759)  Therapeutic Exercise (53288)    The plan of care and goals were established with the patient who concurs.  Patient has been given attendance policy at time of initial evaluation.    Patient Education:  Who will be receiving education: patient  Are they ready to learn: yes  Preferred learning style: written, verbal, demonstration  Barriers to learning: no barriers apparent at this time   Result of initial outlined education: Verbalizes understanding    THERAPY DAILY BILLING   Primary Insurance: MEDICARE  Secondary Insurance: ANTHEM/BCBS    Evaluation Procedures:  Physical Therapy Evaluation: Low Complexity    Timed Procedures:  Neuromuscular Re-Education, 15 minutes  Therapeutic Exercise, 15 minutes    Untimed Procedures:  No untimed codes were used on this date of service    Total Treatment Time: 50 minutes        G-Code:  G-Code Score ABN form  : Current Carrying/Moving/Handling Objects Limitation,  CK - 40% to 59% impaired, limited or restricted  : Goal Carrying/Moving/Handling Objects Limitation,  CJ - 20% to 39% impaired, limited or restricted  : D/C Carrying/Moving/Handling Objects Limitation,  Not applicable at this time  Modifier based on outcome measure(s)/functional testing/clinical judgement as listed above    The referring provider's electronic or written signature on the evaluation authorizes the therapy plan of care and certifies the need for these services, furnished under this plan of care while under their care.  Mailed/faxed for referring provider signature: ______________________________ Date: _________ Time: _______

## 2019-11-08 ENCOUNTER — EVALUATION (OUTPATIENT)
Dept: PHYSICAL THERAPY | Facility: CLINIC | Age: 69
End: 2019-11-08
Payer: MEDICARE

## 2019-11-08 DIAGNOSIS — M48.062 SPINAL STENOSIS OF LUMBAR REGION WITH NEUROGENIC CLAUDICATION: Primary | ICD-10-CM

## 2019-11-08 PROCEDURE — 97110 THERAPEUTIC EXERCISES: CPT | Performed by: PHYSICAL THERAPIST

## 2019-11-08 NOTE — LETTER
November 8, 2019    MD Yordan Davila [facility] [address]    Patient: Mesha Fuentes   YOB: 1950   Date of Visit: 11/8/2019     Encounter Diagnosis     ICD-10-CM    1  Spinal stenosis of lumbar region with neurogenic claudication M48 062        Dear Dr Nixon Rangel:    Thank you for your recent referral of Mesha Fuentes  Please review the attached evaluation summary from Derian's recent visit  Please verify that you agree with the plan of care by signing the attached order  If you have any questions or concerns, please do not hesitate to call  I sincerely appreciate the opportunity to share in the care of one of your patients and hope to have another opportunity to work with you in the near future  Sincerely,    Wei Melo, PT      Referring Provider:      I certify that I have read the below Plan of Care and certify the need for these services furnished under this plan of treatment while under my care  Dash Conway MD  [address]  [place] [address]  VIA Facsimile: [phone]          PT Re-Evaluation       Assessment  Assessment details: Mesha Fuentes is a 77 y o  male who presents to outpatient PT with a  Spinal stenosis of lumbar region with neurogenic claudication  (primary encounter diagnosis)  Pt is S/P Lumbar fusion at L3/L4, L4/L5, and L5/S1, DOS 5/16  No further referral appears necessary at this time based upon examination results  Pt presents with decreased strength, ROM, functional activity tolerance, and pain with movement in his lumbar spine  Educated patient to avoid vigorous twisting and bending activities  Pt verbalizes understanding  Lumbar spine AROM is limited in all planes, which is  limiting his ability to perform the aforementioned functional activities  Etiologic factors include repetitive poor body mechanics  Prognosis is good given HEP compliance and PT 2/wk    Please contact me if you have any questions or recommendations  Thank you for the opportunity to share in  Derian's care  RA 10/14    Heaven Stewart has demonstrated decreased pain, increased strength, increased range of motion, and increased activity tolerance since starting physical therapy services  He reports an overall improvement of 80% thus far  Pt ambulates into PT with no assistive device this date  Lumbar ROM, and B/L LE strength has improved in all planes since Romy  Advised patient that he should not be on the golf course, until he is cleared by his MD   He continues to present with pain, decreased strength, decreased range of motion, and decreased activity tolerance and would benefit from additional skilled physical therapy interventions to address impairments and maximize function  RA 11/8     Heaven Stewart has demonstrated decreased pain, increased strength, increased range of motion, and increased activity tolerance since starting physical therapy services  He reports an overall improvement of 90% thus far  Pt has met all PT goals, and will be D/C to his HEP this date  At this time, patient has achieved their maximum functional benefit from skilled physical therapy services and will be discharged to their HEP  Patient is in agreement with the plan of care  As a result, patient is discharged from physical therapy            Impairments: abnormal muscle firing, abnormal or restricted ROM, activity intolerance, difficulty understanding, impaired physical strength, lacks appropriate home exercise program, pain with function, safety issue and poor posture     Symptom irritability: moderateUnderstanding of Dx/Px/POC: good   Prognosis: good    Goals  1  In 4-6 weeks, patient will demonstrate a decrease in pain to 0/10 during functional activities  Met   2  In 4-6 weeks, patient will demonstrate an increase in range of motion by 10 degrees  Met   3  In 4-6 weeks, patient will demonstrate an increase in strength by 1/2 grade on MMT  Met     1  In 6-8 weeks, patient will be independent with their home exercise program  Met   2  In 6-8 weeks, patient will have zero limitations with strength  Met   3  In 6-8 weeks, patient will have zero limitations with ROM  Met   4  In 6-8 weeks, patient will have zero limitations with ambulation  Met   5  In 6-8 weeks, patient will have zero limitations with Return to recreational activities   [address] details: Patient was provided a home exercise program and demonstrated an understanding of exercises  Patient was advised to stop performing home exercise program if symptoms increase or new complaints developed  Verbal understanding demonstrated regarding home exercise program instructions  Patient would benefit from: skilled physical therapy   D/C PT         Subjective Evaluation    History of Present Illness  Date of surgery: 5/16/2019  Mechanism of injury: The patient is a 77year old male who presents to outpatient physical therapy with S/P lumbar fusion at L3/L4, L4/L5, L5/S1  Pt reports he was wearing a LSO,  but was told he did not have to wear it  Pt is unaware of any restrictions at this time  The only orders he has is not to play golf  He reports no limitations with sleeping, driving, ADL's, and IADL's     RA 10/14     The patient reports an improvement of 80 percent since beginning skilled physical therapy services  He recently took three weeks off of outpatient PT, and has been doing aquatic therapy on his own in a pool  He reports he feels much better  He is ambulating with no AD at this time  He reports that he was golfing on the golf course a "short game " He denies any radicular sx into his bilateral LE's at this time  RA 11/8     The patient reports an improvement of 90 percent since beginning skilled PT  Reports no limitations with walking, stair negotiation, driving, and prolonged standing   Prior to his surgery he was unable to  prolonged positions without any discomfort in his back  He recently took a trip to [place], where he was able to stand for 2-3 hours   Anticipate D/C from PT this date   Pain                                            10/14           11/8   Current pain rating: 3                      2                0   At best pain ratin                       0                0   At worst pain ratin                     7                2   Quality: sharp and dull ache  Relieving factors: change in position  Aggravating factors: lifting  Progression: improved    Hand dominance: right    Treatments  Current treatment: physical therapy  Patient Goals  Patient goals for therapy: increased strength, increased motion and decreased pain          Objective     Active Range of Motion     Lumbar                                                  RA 10/14                       RA    Flexion: 42 degrees                                 32 degrees                    25 degrees   Extension: 25 degrees                            25 degrees                    25 degrees  Left lateral flexion: 63 degrees                57 degrees                    53 degrees                          Right lateral flexion: 60 degrees              55 degrees                   53 degrees   Left Hip   Flexion: WFL  Abduction: WFL  Adduction: WFL    Right Hip   Flexion: WFL  Abduction: WFL  Adduction: WFL  Left Knee   Flexion: WFL  Extension: WFL    Right Knee   Flexion: WFL  Extension: WFL  Left Ankle/Foot   Dorsiflexion (kf): WFL  Plantar flexion: WFL    Right Ankle/Foot   Dorsiflexion (kf): WFL  Plantar flexion: [facility]    Strength/Myotome Testing     Left Hip                       RA 10/14          11/8   Planes of Motion   Flexion: 4-                          4/5              5/5  Abduction: 4-                     4/5              5/5   Adduction: 4-                     4/5              5/5     Right Hip   Planes of Motion   Flexion: 4-                      4/5                    5/5 Abduction: 4-                 4/5                     5/5   Adduction: 4-                  4/5                    5/5     Left Knee   Flexion: 4                         4+/5                 5/5   Extension: 4                    4+/5                 5/5     Right Knee   Flexion: 4                        4+/5                   5/5   Extension: 4                   4+/5                    5/5   Left Ankle/Foot   Dorsiflexion: 4              4+/5                       5/5   Plantar flexion: 4          4+/5                       5/5     Right Ankle/Foot   Dorsiflexion: 4               4+/5   Plantar flexion: 4           4+/5     Additional Strength Details  Decreased sensation to left leg  Surgical incision(s) inspected  Incision(s) clean, dry and intact with no signs/symptoms of infection  Patient was educated on signs/symptoms of infection and was advised to contact MD immediately if suspect infection       Manual  11/8 10/23 10/30 11/1  11/6   B/L Hamstring  -- --- --- D/C Self hamstring                                         Exercise Diary  11/8 10/23 10/30 11/1  11/6   PPT -- ---- --- --    Hip Add with [employer] -- ---- --- --    Clamshells  -- --- -- --     SLR -- 2#   2x10 flex 2x10 Flex  3# 2x10 Flex 3#  2x10 Flex 3#   Supine Marching         Pball ABS  -- Red ball  5" x20 ea Red ball  5" x20 ea 5" x20 ea  Red Ball  5" x20 Red Ball    Step Ups  "C" 3x10  "C" 3x10 "C" 3x10 "C" 2x10  "C" 3x10   Mini Squat  2x10 3" hold 3" 2x10 3" 2x10 ea 2x10 ea 2x10 3" hold   SLS         HR/TR x30 x30 x30 x30 x30   NuStep  L4 x10 min L4 x10 min L4 x 10 min L3 x10 min  L4 x10 min   DLS ALT UE  Seated on low mat 2x10 ea 3#  Seated on low mat  2x10 ea Seated on low mat 2x10 ea 3# Seated on low mat 2x10 ea 3# Seated on low mat 2x10 ea 3#   DLS ALT LE  Seated on low mat 2x10 ea 3# seated on low mat  2# 2x10 ea Seat on low mat 2x10 ea 3# Seated on low mat 2x10 ea 3#  Seated on low mat 2x10 ea 3#   DLS ALT UE/LE Seated on low mat 2x10 ea 3# LE Seated on low mat   2x10 ea Seat on low mat 2x10 ea 3# LE/UE Seated on low mat 2x10 ea 3#  Seated on low mat 2x10 ea 3# LE/UE   TB MTP/LTP        TB anti-rotation Green TB 5" x10 ea Green TB  5" x10 Green TB  5" x10 Green TB  5''x10  Blue TB 5" x10 ea   Hip ADD machine 120# 3x10 100# 3x10 100# 3x10 100# 3x10  120# 3x10    Hip ABD machine 120# 3x10 100# 3x10 100# 3x10 100# 3x10  120# 3x10   Self HS 30''3x B/L  30" x3 B/L 30" x3 ea Standing at staircase 30" x3 30" x3 ea Standing at staircase   Leg Press 140# 3x10 140# 3x10 140# 3x10 140 3x10  140# 3x10       Modalities

## 2019-11-08 NOTE — PROGRESS NOTES
PT Re-Evaluation       Assessment  Assessment details: Emmanuel Belcher is a 77 y o  male who presents to outpatient PT with a  Spinal stenosis of lumbar region with neurogenic claudication  (primary encounter diagnosis)  Pt is S/P Lumbar fusion at L3/L4, L4/L5, and L5/S1, DOS 5/16  No further referral appears necessary at this time based upon examination results  Pt presents with decreased strength, ROM, functional activity tolerance, and pain with movement in his lumbar spine  Educated patient to avoid vigorous twisting and bending activities  Pt verbalizes understanding  Lumbar spine AROM is limited in all planes, which is  limiting his ability to perform the aforementioned functional activities  Etiologic factors include repetitive poor body mechanics  Prognosis is good given HEP compliance and PT 2/wk  Please contact me if you have any questions or recommendations  Thank you for the opportunity to share in  Derian's care  RA 10/14    Emmanuel Belcher has demonstrated decreased pain, increased strength, increased range of motion, and increased activity tolerance since starting physical therapy services  He reports an overall improvement of 80% thus far  Pt ambulates into PT with no assistive device this date  Lumbar ROM, and B/L LE strength has improved in all planes since [facility]  Advised patient that he should not be on the golf course, until he is cleared by his MD   He continues to present with pain, decreased strength, decreased range of motion, and decreased activity tolerance and would benefit from additional skilled physical therapy interventions to address impairments and maximize function  RA 11/8     Emmanuel Belcher has demonstrated decreased pain, increased strength, increased range of motion, and increased activity tolerance since starting physical therapy services  He reports an overall improvement of 90% thus far  Pt has met all PT goals, and will be D/C to his HEP this date   At this time, patient has achieved their maximum functional benefit from skilled physical therapy services and will be discharged to their [facility]  Patient is in agreement with the plan of care  As a result, patient is discharged from physical therapy            Impairments: abnormal muscle firing, abnormal or restricted ROM, activity intolerance, difficulty understanding, impaired physical strength, lacks appropriate home exercise program, pain with function, safety issue and poor posture     Symptom irritability: moderateUnderstanding of Dx/Px/POC: good   Prognosis: good    Goals  1  In 4-6 weeks, patient will demonstrate a decrease in pain to 0/10 during functional activities  Met   2  In 4-6 weeks, patient will demonstrate an increase in range of motion by 10 degrees  Met   3  In 4-6 weeks, patient will demonstrate an increase in strength by 1/2 grade on MMT  Met     1  In 6-8 weeks, patient will be independent with their home exercise program  Met   2  In 6-8 weeks, patient will have zero limitations with strength  Met   3  In 6-8 weeks, patient will have zero limitations with ROM  Met   4  In 6-8 weeks, patient will have zero limitations with ambulation  Met   5  In 6-8 weeks, patient will have zero limitations with Return to recreational activities   [address] details: Patient was provided a home exercise program and demonstrated an understanding of exercises  Patient was advised to stop performing home exercise program if symptoms increase or new complaints developed  Verbal understanding demonstrated regarding home exercise program instructions  Patient would benefit from: skilled physical therapy   D/C PT         Subjective Evaluation    History of Present Illness  Date of surgery: 5/16/2019  Mechanism of injury: The patient is a 77year old male who presents to outpatient physical therapy with S/P lumbar fusion at L3/L4, L4/L5, L5/S1   Pt reports he was wearing a LSO,  but was told he did not have to wear it  Pt is unaware of any restrictions at this time  The only orders he has is not to play golf  He reports no limitations with sleeping, driving, ADL's, and IADL's     RA 10/14     The patient reports an improvement of 80 percent since beginning skilled physical therapy services  He recently took three weeks off of outpatient PT, and has been doing aquatic therapy on his own in a pool  He reports he feels much better  He is ambulating with no AD at this time  He reports that he was golfing on the golf course a "short game " He denies any radicular sx into his bilateral LE's at this time  RA      The patient reports an improvement of 90 percent since beginning skilled PT  Reports no limitations with walking, stair negotiation, driving, and prolonged standing  Prior to his surgery he was unable to  prolonged positions without any discomfort in his back  He recently took a trip to [employer], where he was able to stand for 2-3 hours   Anticipate D/C from PT this date   Pain                                            10/14           11/8   Current pain rating: 3                      2                0   At best pain ratin                       0                0   At worst pain ratin                     7                2   Quality: sharp and dull ache  Relieving factors: change in position  Aggravating factors: lifting  Progression: improved    Hand dominance: right    Treatments  Current treatment: physical therapy  Patient Goals  Patient goals for therapy: increased strength, increased motion and decreased pain          Objective     Active Range of Motion     Lumbar                                                  RA 10/14                       RA    Flexion: 42 degrees                                 32 degrees                    25 degrees   Extension: 25 degrees                            25 degrees                    25 degrees  Left lateral flexion: 63 degrees                57 degrees 53 degrees                          Right lateral flexion: 60 degrees              55 degrees                   53 degrees   Left Hip   Flexion: WFL  Abduction: WFL  Adduction: WFL    Right Hip   Flexion: WFL  Abduction: WFL  Adduction: WFL  Left Knee   Flexion: WFL  Extension: WFL    Right Knee   Flexion: WFL  Extension: WFL  Left Ankle/Foot   Dorsiflexion (kf): WFL  Plantar flexion: WFL    Right Ankle/Foot   Dorsiflexion (kf): WFL  Plantar flexion: [facility]    Strength/Myotome Testing     Left Hip                       RA 10/14          11/8   Planes of Motion   Flexion: 4-                          4/5              5/5  Abduction: 4-                     4/5              5/5   Adduction: 4-                     4/5              5/5     Right Hip   Planes of Motion   Flexion: 4-                      4/5                    5/5   Abduction: 4-                 4/5                     5/5   Adduction: 4-                  4/5                    5/5     Left Knee   Flexion: 4                         4+/5                 5/5   Extension: 4                    4+/5                 5/5     Right Knee   Flexion: 4                        4+/5                   5/5   Extension: 4                   4+/5                    5/5   Left Ankle/Foot   Dorsiflexion: 4              4+/5                       5/5   Plantar flexion: 4          4+/5                       5/5     Right Ankle/Foot   Dorsiflexion: 4               4+/5                     5/5   Plantar flexion: 4           4+/5                      5/5      Additional Strength Details  Decreased sensation to left leg  Surgical incision(s) inspected  Incision(s) clean, dry and intact with no signs/symptoms of infection  Patient was educated on signs/symptoms of infection and was advised to contact MD immediately if suspect infection       Manual  11/8 10/23 10/30 11/1  11/6   B/L Hamstring  -- --- --- D/C Self hamstring Exercise Diary  11/8 10/23 10/30 11/1  11/6   PPT -- ---- --- --    Hip Add with Ball -- ---- --- --    Clamshells  -- --- -- --     SLR -- 2#   2x10 flex 2x10 Flex  3# 2x10 Flex 3#  2x10 Flex 3#   Supine Marching         Pball ABS  -- Red ball  5" x20 ea Red ball  5" x20 ea 5" x20 ea  Red Ball  5" x20 Red Ball    Step Ups  "C" 3x10  "C" 3x10 "C" 3x10 "C" 2x10  "C" 3x10   Mini Squat  2x10 3" hold 3" 2x10 3" 2x10 ea 2x10 ea 2x10 3" hold   SLS         HR/TR x30 x30 x30 x30 x30   NuStep  L4 x10 min L4 x10 min L4 x 10 min L3 x10 min  L4 x10 min   DLS ALT UE  Seated on low mat 2x10 ea 3#  Seated on low mat  2x10 ea Seated on low mat 2x10 ea 3# Seated on low mat 2x10 ea 3# Seated on low mat 2x10 ea 3#   DLS ALT LE  Seated on low mat 2x10 ea 3# seated on low mat  2# 2x10 ea Seat on low mat 2x10 ea 3# Seated on low mat 2x10 ea 3#  Seated on low mat 2x10 ea 3#   DLS ALT UE/LE Seated on low mat 2x10 ea 3# LE Seated on low mat   2x10 ea Seat on low mat 2x10 ea 3# LE/UE Seated on low mat 2x10 ea 3#  Seated on low mat 2x10 ea 3# LE/UE   TB MTP/LTP        TB anti-rotation Green TB 5" x10 ea Green TB  5" x10 Green TB  5" x10 Green TB  5''x10  Blue TB 5" x10 ea   Hip ADD machine 120# 3x10 100# 3x10 100# 3x10 100# 3x10  120# 3x10    Hip ABD machine 120# 3x10 100# 3x10 100# 3x10 100# 3x10  120# 3x10   Self HS 30''3x B/L  30" x3 B/L 30" x3 ea Standing at staircase 30" x3 30" x3 ea Standing at staircase   Leg Press 140# 3x10 140# 3x10 140# 3x10 140 3x10  140# 3x10       Modalities

## 2019-11-13 ENCOUNTER — TRANSCRIBE ORDERS (OUTPATIENT)
Dept: PHYSICAL THERAPY | Facility: CLINIC | Age: 69
End: 2019-11-13

## 2019-11-13 DIAGNOSIS — M48.062 SPINAL STENOSIS OF LUMBAR REGION WITH NEUROGENIC CLAUDICATION: Primary | ICD-10-CM

## 2019-11-14 ENCOUNTER — APPOINTMENT (OUTPATIENT)
Dept: PHYSICAL THERAPY | Facility: CLINIC | Age: 69
End: 2019-11-14
Payer: MEDICARE

## 2019-11-15 ENCOUNTER — EVALUATION (OUTPATIENT)
Dept: PHYSICAL THERAPY | Facility: CLINIC | Age: 69
End: 2019-11-15
Payer: MEDICARE

## 2019-11-15 DIAGNOSIS — M25.511 RIGHT SHOULDER PAIN, UNSPECIFIED CHRONICITY: Primary | ICD-10-CM

## 2019-11-15 PROCEDURE — 97162 PT EVAL MOD COMPLEX 30 MIN: CPT | Performed by: PHYSICAL THERAPIST

## 2019-11-15 NOTE — PROGRESS NOTES
PT Evaluation     Today's date: 11/15/2019  Patient name: Aida Tripathi  : 1950  MRN: [id number]  Referring provider: Michelle Chen MD  Dx:   Encounter Diagnosis     ICD-10-CM    1  Right shoulder pain, unspecified chronicity M25 511                   Assessment  Assessment details: Aida Tripathi is a 77 y o  male who presents to outpatient PT with a  Right shoulder pain, unspecified chronicity  (primary encounter diagnosis)  No further referral appears necessary at this time based upon examination results  Pt presents with decreased strength, ROM, balance, functional activity tolerance, and pain with movement in his right shoulder which is  limiting his ability to perform the aforementioned functional activities  Right shoulder AROM is limited in all planes during manual stretching, [facility] during manuals  Scapular winging in right shoulder during static standing  Posture is grossly poor with forward head and shoulders  Etiologic factors include repetitive poor body mechanics  Prognosis is good given HEP compliance and PT 2/wk  Please contact me if you have any questions or recommendations  Thank you for the opportunity to share in  [facility]  Impairments: abnormal muscle firing, abnormal muscle tone, abnormal or restricted ROM, abnormal movement, activity intolerance, impaired physical strength, lacks appropriate home exercise program, pain with function, scapular dyskinesis, poor posture  and poor body mechanics    Symptom irritability: moderateUnderstanding of Dx/Px/POC: good   Prognosis: good    Goals  1  In 6-8 weeks, patient will be independent with their home exercise program  2  In 6-8 weeks, patient will have zero limitations with right shoulder strength  3  In 6-8 weeks, patient will have zero limitations with right shoulder ROM  4  In 6-8 weeks, patient will have zero limitations with ambulation  5  In 6-8 weeks, patient will have zero limitations with stair negotiation    1    In 4-6 weeks, patient will demonstrate a decrease in pain to 0/10 during functional activities  2  In 4-6 weeks, patient will demonstrate an increase in right shoulder range of motion by 20 degrees  3  In 4-6 weeks, patient will demonstrate an increase in right shoulder strength by 1/2 grade on MMT        Plan  Patient would benefit from: skilled physical therapy  Planned therapy interventions: ADL training, flexibility, functional ROM exercises, graded activity, graded exercise, home exercise program, joint mobilization, manual therapy, strengthening, stretching, therapeutic activities and therapeutic exercise  Frequency: 2x week  Duration in weeks: 4  Plan of Care beginning date: 11/15/2019  Plan of Care expiration date: 2019  Treatment plan discussed with: patient        Subjective Evaluation    History of Present Illness  Date of onset: 10/18/2019  Mechanism of injury: The patient is a 77year old male who presents to outpatient physical therapy with a chief complaint of right shoulder pain  Pain is of 1 month duration  Pt reports he was cutting branches off a tree, which is when he initially noticed the shoulder  Reports localized pain to the anterior lateral aspect of his right shoulder at the time of his IE  No x-ray or MRI at this time  Reports difficulty with functional activities that involve right shoulder abduction, flexion, and Hz adduction     Pain  Current pain rating: 3  At best pain ratin  At worst pain ratin  Quality: burning, discomfort, tight and dull ache  Relieving factors: relaxation, rest, support and change in position  Progression: worsening    Hand dominance: right    Treatments  Current treatment: physical therapy  Patient Goals  Patient goals for therapy: increased strength, decreased pain and increased motion          Objective     Active Range of Motion   Left Shoulder   Flexion: 145 degrees   Abduction: 155 degrees   External rotation 90°: 70 degrees   Internal rotation BTB: T7     Right Shoulder   Flexion: 90 degrees   Abduction: 85 degrees   External rotation 90°: 80 degrees  Internal rotation BTB: Active internal rotation behind the back: Reaches to right buttocks  Additional Active Range of Motion Details  Pain and tenderness to right supraspinatus tendon, Right Biceps Tendon       Passive Range of Motion     Right Shoulder   Flexion: WFL  Abduction: WFL  External rotation 45°: WFL  Internal rotation 45°: WFL    Strength/Myotome Testing     Right Shoulder     Planes of Motion   Flexion: 2-   Abduction: 2-   External rotation at 90°: 2   Internal rotation at 90°: 2     Additional Strength Details  Empty Can - Positive     Drop Arm - Positive              Precautions: Hx of DVT, Arthritis, Anxiety, Impulsive, ** Recent spinal fusion surgery in May of 2019 RA due 12/13     Daily Treatment Diary     Manual              PROM to Right Shoulder                                                                      Exercise Diary              MTP/LTP              Scap Squeezes             Shrugs Retractions              Bent Row              TB Flexion, Abduction, ER, IR              ITY              Prone Extension             Prone Flexion              Prone Abduction              Pendulums             Pulley              Finger Ladder              TB ER/IR             Scap Punch                                                                                               Modalities

## 2019-11-19 ENCOUNTER — OFFICE VISIT (OUTPATIENT)
Dept: PHYSICAL THERAPY | Facility: CLINIC | Age: 69
End: 2019-11-19
Payer: MEDICARE

## 2019-11-19 DIAGNOSIS — M25.511 RIGHT SHOULDER PAIN, UNSPECIFIED CHRONICITY: Primary | ICD-10-CM

## 2019-11-19 PROCEDURE — 97150 GROUP THERAPEUTIC PROCEDURES: CPT | Performed by: PHYSICAL THERAPIST

## 2019-11-19 PROCEDURE — 97140 MANUAL THERAPY 1/> REGIONS: CPT | Performed by: PHYSICAL THERAPIST

## 2019-11-19 NOTE — PROGRESS NOTES
Daily Note     Today's date: 2019  Patient name: Petty Navas  : 1950  MRN: [id number]  Referring provider: Zoya Jain MD  Dx:   Encounter Diagnosis     ICD-10-CM    1  Right shoulder pain, unspecified chronicity M25 511                   Subjective:  Pt offers no new complaints this session  Objective: See treatment diary below      Assessment: Tolerated treatment well  Patient exhibited good technique with therapeutic exercises and would benefit from continued PT  Pt unable to tolerate 10 minutes on UBE this date  Right shoulder PROM is most limited into abduction during manual therapy  Plan: Continue per plan of care        Precautions: Hx of DVT, Arthritis, Anxiety, Impulsive, ** Recent spinal fusion surgery in May of 2019 RA due      Daily Treatment Diary     Manual               PROM to Right Shoulder  Performed                                                                     Exercise Diary               MTP/LTP  Red 5''20x             Scap Squeezes 5''20x              Shrugs Retractions              Bent Row              TB Flexion, Abduction, ER, IR  ER/IR 20x Red             ITY  Flex, Scaption 5''10x             Prone Extension             Prone Flexion              Prone Abduction              Pendulums             Pulley  Flexion, Scaption 10''10x            Finger Ladder  Flexion, 5'10x             TB ER/IR Red 20x             Scap Punch                                                                                               Modalities

## 2019-11-22 ENCOUNTER — OFFICE VISIT (OUTPATIENT)
Dept: PHYSICAL THERAPY | Facility: CLINIC | Age: 69
End: 2019-11-22
Payer: MEDICARE

## 2019-11-22 DIAGNOSIS — M25.511 RIGHT SHOULDER PAIN, UNSPECIFIED CHRONICITY: Primary | ICD-10-CM

## 2019-11-22 DIAGNOSIS — M48.062 SPINAL STENOSIS OF LUMBAR REGION WITH NEUROGENIC CLAUDICATION: ICD-10-CM

## 2019-11-22 PROCEDURE — 97140 MANUAL THERAPY 1/> REGIONS: CPT

## 2019-11-22 PROCEDURE — 97110 THERAPEUTIC EXERCISES: CPT

## 2019-11-22 NOTE — PROGRESS NOTES
Daily Note     Today's date: 2019  Patient name: Willia Pallas  : 1950  MRN: [id number]  Referring provider: Jose Enrique Gallardo MD  Dx:   Encounter Diagnosis     ICD-10-CM    1  Right shoulder pain, unspecified chronicity M25 511    2  Spinal stenosis of lumbar region with neurogenic claudication M48 062        Start Time: 0700          Subjective: Pt c/o R shoulder is sore today  States he has been using it a lot at home  Reports he had an episode where he was reaching overhead and had to use L arm to get it down,"it locked"  Objective: See treatment diary below      Assessment: Tolerated treatment well  Pt demonstrates R shoulder discomfort with  AA scaption and resisted ER today  Patient would benefit from continued PT to increase strength and ROM R shoulder  Plan: Continue per plan of care        Precautions: Hx of DVT, Arthritis, Anxiety, Impulsive, ** Recent spinal fusion surgery in May of 2019 RA due      Daily Treatment Diary     Manual        PROM to Right Shoulder  Performed  performed                                10 min          Exercise Diary        UBC  120 5'fwd/5'retro      MTP/LTP  Red 5''20x  Red 5" x20      Scap Squeezes 5''20x   5" x20      Shrugs Retractions         Bent Row         TB Flexion, Abduction, ER, IR  ER/IR 20x Red  Red TB  IRx20   ER x10      ITY  Flex, Scaption 5''10x  Flex, scap  5" x10      Prone Extension        Prone Flexion         Prone Abduction         Pendulums        Pulley  Flexion, Scaption 10''10x Flex/scap  10"x10      Finger Ladder  Flexion, 5'10x  Flex   5"x10  To 32      TB ER/IR Red 20x        Scap Punch  x10 B/L                                                          Modalities

## 2019-11-25 ENCOUNTER — OFFICE VISIT (OUTPATIENT)
Dept: PHYSICAL THERAPY | Facility: CLINIC | Age: 69
End: 2019-11-25
Payer: MEDICARE

## 2019-11-25 DIAGNOSIS — M25.511 RIGHT SHOULDER PAIN, UNSPECIFIED CHRONICITY: Primary | ICD-10-CM

## 2019-11-25 DIAGNOSIS — M48.062 SPINAL STENOSIS OF LUMBAR REGION WITH NEUROGENIC CLAUDICATION: ICD-10-CM

## 2019-11-25 PROCEDURE — 97110 THERAPEUTIC EXERCISES: CPT

## 2019-11-25 PROCEDURE — 97140 MANUAL THERAPY 1/> REGIONS: CPT

## 2019-11-25 NOTE — PROGRESS NOTES
Daily Note     Today's date: 2019  Patient name: Brian Tom  : 1950  MRN: [id number]  Referring provider: Bell Gonzáles MD  Dx:   Encounter Diagnosis     ICD-10-CM    1  Right shoulder pain, unspecified chronicity M25 511    2  Spinal stenosis of lumbar region with neurogenic claudication M48 062        Start Time: 2743  Stop Time: 0747  Total time in clinic (min): 48 minutes    Subjective: Pt reports R shoulder is still sore  C/o occasional 8/10 pain into forearm with lying in bed  Objective: See treatment diary below      Assessment: Tolerated treatment fair  Pt c/o increased pain with TB resisted R shoulder  IR today, end range flex and abd  Encouraged pt to try supporting  R UE on pillow while sleeping on side in bed to avoid increased pain at night  Patient demonstrated fatigue post treatment and would benefit from continued PT      Plan: Continue per plan of care        Precautions: Hx of DVT, Arthritis, Anxiety, Impulsive, ** Recent spinal fusion surgery in May of 2019 RA due      Daily Treatment Diary     Manual       PROM to Right Shoulder  Performed  performed performed                               10 min x10 min         Exercise Diary       UBC  120 5'fwd/5'retro 120 5'fwd/5'retro     MTP/LTP  Red 5''20x  Red 5" x20 Green  5" 2x10     Scap Squeezes 5''20x   5" x20 5" x20     Shrugs Retractions         Bent Row         TB Flexion, Abduction, ER, IR  ER/IR 20x Red  Red TB  IRx20   ER x10 Red TB  IR x5  ER  x20      ITY  Flex, Scaption 5''10x  Flex, scap  5" x10 Flex, scap   5" x10 ea     Prone Extension        Prone Flexion         Prone Abduction         Pendulums        Pulley  Flexion, Scaption 10''10x Flex/scap  10"x10 Flex/scap  10" x10 ea     Finger Ladder  Flexion, 5'10x  Flex   5"x10  To 32 Flex   5" x5         Scap Punch  x10 B/L 5" x10 B/L                                                         Modalities

## 2019-11-27 ENCOUNTER — OFFICE VISIT (OUTPATIENT)
Dept: PHYSICAL THERAPY | Facility: CLINIC | Age: 69
End: 2019-11-27
Payer: MEDICARE

## 2019-11-27 DIAGNOSIS — M25.511 RIGHT SHOULDER PAIN, UNSPECIFIED CHRONICITY: Primary | ICD-10-CM

## 2019-11-27 DIAGNOSIS — M48.062 SPINAL STENOSIS OF LUMBAR REGION WITH NEUROGENIC CLAUDICATION: ICD-10-CM

## 2019-11-27 PROCEDURE — 97110 THERAPEUTIC EXERCISES: CPT

## 2019-11-27 PROCEDURE — 97140 MANUAL THERAPY 1/> REGIONS: CPT

## 2019-11-27 NOTE — PROGRESS NOTES
Daily Note     Today's date: 2019  Patient name: Chencho Bond  : 1950  MRN: [id number]  Referring provider: oJshua Fuentes MD  Dx:   Encounter Diagnosis     ICD-10-CM    1  Right shoulder pain, unspecified chronicity M25 511    2  Spinal stenosis of lumbar region with neurogenic claudication M48 062                   Subjective: Pt c/o 4/10 R shoulder pain yesterday  States he went golfing yesterday and hurt his R shoulder on first swing, ended up taking it easy with R arm the rest of the holes and it felt better  Pt also c/o pain into R side neck today  Objective: See treatment diary below      Assessment: Tolerated treatment fair  Pt c/o increased R shoulder pain with AA scap today  Pt guarded with PROM today, c/o "catching" with eccentric flex @ approx 45 deg  Tenderness noted along R bicep tendon  Program modified to pt tolerance today  Patient demonstrated fatigue post treatment and would benefit from continued PT  Pt will ice R shoulder at home  Plan: Continue per plan of care        Precautions: Hx of DVT, Arthritis, Anxiety, Impulsive, ** Recent spinal fusion surgery in May of 2019 RA due      Daily Treatment Diary     Manual      PROM to Right Shoulder  Performed  performed performed performed    UT stretch    30" x3                      10 min x10 min x10 min        Exercise Diary      UBC  120 5'fwd/5'retro 120 5'fwd/5'retro 120 5'fwd/5'retro    MTP/LTP  Red 5''20x  Red 5" x20 Green  5" 2x10 Green  5" 2x10 ea    Scap Squeezes 5''20x   5" x20 5" x20     Shrugs Retractions         Bent Row         TB Flexion, Abduction, ER, IR  ER/IR 20x Red  Red TB  IRx20   ER x10 Red TB  IR x5  ER  x20  Red TB  IR/ER  2x10ea    ITY  Flex, Scaption 5''10x  Flex, scap  5" x10 Flex, scap   5" x10 ea Flex  5" x10 ea    Prone Extension        Prone Flexion         Prone Abduction         Pendulums        Pulley  Flexion, Scaption 10''10x Flex/scap  10"x10 Flex/scap  10" x10 ea Flex/scap  10" x10 ea    Finger Ladder  Flexion, 5'10x  Flex   5"x10  To 32 Flex   5" x5     Flex 5" x10    Scap Punch  x10 B/L 5" x10 B/L 5" x10 B/L    Levator scap stretch    10" x3                                        Modalities

## 2019-12-02 ENCOUNTER — APPOINTMENT (OUTPATIENT)
Dept: PHYSICAL THERAPY | Facility: CLINIC | Age: 69
End: 2019-12-02
Payer: MEDICARE

## 2019-12-04 ENCOUNTER — TRANSCRIBE ORDERS (OUTPATIENT)
Dept: PHYSICAL THERAPY | Facility: CLINIC | Age: 69
End: 2019-12-04

## 2019-12-04 ENCOUNTER — OFFICE VISIT (OUTPATIENT)
Dept: PHYSICAL THERAPY | Facility: CLINIC | Age: 69
End: 2019-12-04
Payer: MEDICARE

## 2019-12-04 DIAGNOSIS — M48.062 SPINAL STENOSIS OF LUMBAR REGION WITH NEUROGENIC CLAUDICATION: Primary | ICD-10-CM

## 2019-12-04 DIAGNOSIS — M48.062 SPINAL STENOSIS OF LUMBAR REGION WITH NEUROGENIC CLAUDICATION: ICD-10-CM

## 2019-12-04 DIAGNOSIS — M25.511 RIGHT SHOULDER PAIN, UNSPECIFIED CHRONICITY: Primary | ICD-10-CM

## 2019-12-04 PROCEDURE — 97110 THERAPEUTIC EXERCISES: CPT

## 2019-12-04 PROCEDURE — 97140 MANUAL THERAPY 1/> REGIONS: CPT

## 2019-12-04 NOTE — PROGRESS NOTES
Daily Note     Today's date: 2019  Patient name: Nestor Braun  : 1950  MRN: [id number]  Referring provider: Enrique Reis MD  Dx:   Encounter Diagnosis     ICD-10-CM    1  Right shoulder pain, unspecified chronicity M25 511    2  Spinal stenosis of lumbar region with neurogenic claudication M48 062                   Subjective: Pt  reports he has 2/10 pain on the top of his shoulder  Objective: See treatment diary below      Assessment: Tolerated treatment well  Patient exhibited good technique with therapeutic exercises and would benefit from continued PT  Pt  does note some increased pain following RX session  Pt  c/o "cramping" feeling with serratus punch exercise which resolved following activity  PROM WNL  Plan: Progress treatment as tolerated         Precautions: Hx of DVT, Arthritis, Anxiety, Impulsive, ** Recent spinal fusion surgery in May of 2019 RA due      Daily Treatment Diary     Manual     PROM to Right Shoulder  Performed  performed performed performed performed   UT stretch    30" x3                      10 min x10 min x10 min x10 min       Exercise Diary   12   UBC  120 5'fwd/5'retro 120 5'fwd/5'retro 120 5'fwd/5'retro 120  5/5   MTP/LTP  Red 5''20x  Red 5" x20 Green  5" 2x10 Green  5" 2x10 ea Green 5"2x10 ea   Scap Squeezes 5''20x   5" x20 5" x20     Shrugs Retractions         Bent Row         TB Flexion, Abduction, ER, IR  ER/IR 20x Red  Red TB  IRx20   ER x10 Red TB  IR x5  ER  x20  Red TB  IR/ER  2x10ea Red TB  IR/ER  2x10 ea   ITY  Flex, Scaption 5''10x  Flex, scap  5" x10 Flex, scap   5" x10 ea Flex  5" x10 ea Fl/Scap  5"x10 ea   Prone Extension        Prone Flexion         Prone Abduction         Pendulums        Pulley  Flexion, Scaption 10''10x Flex/scap  10"x10 Flex/scap  10" x10 ea Flex/scap  10" x10 ea F/Scap  10" x 10 ea   Finger Ladder  Flexion, 5'10x  Flex   5"x10  To 32 Flex   5" x5     Flex 5" x10 Flex 5"x10   Scap Punch  x10 B/L 5" x10 B/L 5" x10 B/L 5"x10 B/L   Levator scap stretch    10" x3 10"x3                                       Modalities

## 2019-12-10 ENCOUNTER — APPOINTMENT (OUTPATIENT)
Dept: PHYSICAL THERAPY | Facility: CLINIC | Age: 69
End: 2019-12-10
Payer: MEDICARE

## 2019-12-11 ENCOUNTER — OFFICE VISIT (OUTPATIENT)
Dept: PHYSICAL THERAPY | Facility: CLINIC | Age: 69
End: 2019-12-11
Payer: MEDICARE

## 2019-12-11 DIAGNOSIS — M25.511 RIGHT SHOULDER PAIN, UNSPECIFIED CHRONICITY: Primary | ICD-10-CM

## 2019-12-11 PROCEDURE — 97150 GROUP THERAPEUTIC PROCEDURES: CPT

## 2019-12-11 PROCEDURE — 97140 MANUAL THERAPY 1/> REGIONS: CPT

## 2019-12-11 NOTE — PROGRESS NOTES
Daily Note     Today's date: 2019  Patient name: Brian Tom  : 1950  MRN: [id number]  Referring provider: Bell Gonzáles MD  Dx:   Encounter Diagnosis     ICD-10-CM    1  Right shoulder pain, unspecified chronicity M25 511        Start Time: 07  Stop Time: 0750  Total time in clinic (min): 45 minutes    Subjective: "My shoulder feel good today "        Objective: See treatment diary below      Assessment: Tolerated treatment well  Initiated a weighted delt row to strengthen BUE  PROM of the [facility]  Patient demonstrated fatigue post treatment and exhibited good technique with therapeutic exercises      Plan: Continue per plan of care  Progress note during next visit        Precautions: Hx of DVT, Arthritis, Anxiety, Impulsive, ** Recent spinal fusion surgery in May of 2019 RA due      Daily Treatment Diary     Manual     PROM to Right Shoulder  Performed  performed performed performed performed   UT stretch    30" x3                     10 min 10 min x10 min x10 min x10 min       Exercise Diary   12/4   UBC 90 5'fwd/5'retro 120 5'fwd/5'retro 120 5'fwd/5'retro 120 5'fwd/5'retro 120  5/5   MTP/LTP  Blue 5'' 20x  Red 5" x20 Green  5" 2x10 Green  5" 2x10 ea Green 5"2x10 ea   Scap Squeezes  5" x20 5" x20     Shrugs Retractions         Bent Row         TB Flexion, Abduction, ER, IR  Green TB  IR/ER  2x10 ea Red TB  IRx20   ER x10 Red TB  IR x5  ER  x20  Red TB  IR/ER  2x10ea Red TB  IR/ER  2x10 ea   ITY  5''10x ea    Unable to perform "T" secondary to pain  Flex, scap  5" x10 Flex, scap   5" x10 ea Flex  5" x10 ea Fl/Scap  5"x10 ea   Prone Extension        Prone Flexion         Prone Abduction         Pendulums        Pulley  Flex/Scap 10''10x Flex/scap  10"x10 Flex/scap  10" x10 ea Flex/scap  10" x10 ea F/Scap  10" x 10 ea   Finger Ladder  Flex 5"10x  Flex   5"x10  To 32 Flex   5" x5     Flex 5" x10 Flex 5"x10   Scap Punch 5"x20 B/L x10 B/L 5" x10 B/L 5" x10 B/L 5"x10 B/L   Levator scap stretch    10" x3 10"x3   Delt Row 30# 2x10                                   Modalities

## 2019-12-12 ENCOUNTER — EVALUATION (OUTPATIENT)
Dept: PHYSICAL THERAPY | Facility: CLINIC | Age: 69
End: 2019-12-12
Payer: MEDICARE

## 2019-12-12 DIAGNOSIS — M25.511 RIGHT SHOULDER PAIN, UNSPECIFIED CHRONICITY: Primary | ICD-10-CM

## 2019-12-12 PROCEDURE — 97110 THERAPEUTIC EXERCISES: CPT | Performed by: PHYSICAL THERAPIST

## 2019-12-12 NOTE — PROGRESS NOTES
PT Re-Evaluation     Today's date:   Patient name: Giovani Cohen  : 1950  MRN: [id number]  Referring provider: Leandra Ramirez MD  Dx:   Encounter Diagnosis     ICD-10-CM    1  Right shoulder pain, unspecified chronicity M25 511                   Assessment  Assessment details: Giovani Cohen is a 77 y o  male who presents to outpatient PT with a  Right shoulder pain, unspecified chronicity  (primary encounter diagnosis)  No further referral appears necessary at this time based upon examination results  Pt presents with decreased strength, ROM, balance, functional activity tolerance, and pain with movement in his right shoulder which is  limiting his ability to perform the aforementioned functional activities  Right shoulder AROM is limited in all planes during manual stretching, PROM is [facility] [place] during manuals  Scapular winging in right shoulder during static standing  Posture is grossly poor with forward head and shoulders  Etiologic factors include repetitive poor body mechanics  Prognosis is good given HEP compliance and PT 2/wk  Please contact me if you have any questions or recommendations  Thank you for the opportunity to share in  [facility]     Giovani Cohen has demonstrated decreased pain, increased strength, increased range of motion, and increased activity tolerance since starting physical therapy services  He reports an overall improvement of 100% thus far  At this time, patient has achieved their maximum functional benefit from skilled physical therapy services and will be discharged to their [facility]  Patient is in agreement with the plan of care    As a result, patient is discharged from physical therapy        Impairments: abnormal muscle firing, abnormal muscle tone, abnormal or restricted ROM, abnormal movement, activity intolerance, impaired physical strength, lacks appropriate home exercise program, pain with function, scapular dyskinesis, poor posture  and poor body mechanics    Symptom irritability: moderateUnderstanding of Dx/Px/POC: good   Prognosis: good    Goals    STG  1  In 6-8 weeks, patient will be independent with their home exercise program  Met   2  In 6-8 weeks, patient will have zero limitations with right shoulder strength  Met   3  In 6-8 weeks, patient will have zero limitations with right shoulder ROM  Met       LTG  1  In 4-6 weeks, patient will demonstrate a decrease in pain to 0/10 during functional activities  Met   2  In 4-6 weeks, patient will demonstrate an increase in right shoulder range of motion by 20 degrees  Met   3  In 4-6 weeks, patient will demonstrate an increase in right shoulder strength by 1/2 grade on MMT  Met         Plan    Frequency:DC PT   Duration in weeks:   Plan of Care beginning date:   Plan of Care expiration date:   Treatment plan discussed with: patient        Subjective Evaluation    History of Present Illness  Date of onset: 10/18/2019  Mechanism of injury: The patient is a 77year old male who presents to outpatient physical therapy with a chief complaint of right shoulder pain  Pain is of 1 month duration  Pt reports he was cutting branches off a tree, which is when he initially noticed the shoulder  Reports localized pain to the anterior lateral aspect of his right shoulder at the time of his IE  No x-ray or MRI at this time  Reports difficulty with functional activities that involve right shoulder abduction, flexion, and Hz adduction  RA    The patient reports an improvement of 100 percent since beginning skilled PT  Reports no limitations with reaching at and above shoulder height  Reports no limitations with ADL's, IADL's  And recreational activities   Pt to be D/C from skilled PT this date, will resume the wellness program         Pain                                RA   Current pain rating: 3           1  At best pain ratin            0   At worst pain ratin          5  Quality: burning, discomfort, tight and dull ache  Relieving factors: relaxation, rest, support and change in position  Progression: worsening    Hand dominance: right    Treatments  Current treatment: physical therapy  Patient Goals  Patient goals for therapy: increased strength, decreased pain and increased motion          Objective      Active Range of Motion               RA 12/12   Left Shoulder                                Flexion: 145 degrees                       160 degrees   Abduction: 155 degrees                  170 degrees   External rotation 90°: 70 degrees     90 degrees   Internal rotation BTB: T7                   T7     Right Shoulder                                     RA 12/12   Flexion: 90 degrees                             160 Degrees   Abduction: 85 degrees                        165 degrees   External rotation 90°: 80 degrees         90 degrees     Internal rotation BTB: Active internal rotation behind the back: Reaches to right buttocks  RA 12/12 Reaches to L2      Additional Active Range of Motion Details  Pain and tenderness to right supraspinatus tendon, Right Biceps Tendon       Passive Range of Motion     Right Shoulder   Flexion: WFL  Abduction: WFL  External rotation 45°: WFL  Internal rotation 45°: WFL    Strength/Myotome Testing     Right Shoulder     Planes of Motion               RA 12/12   Flexion: 2-                               4/5   Abduction: 2-                           4/5   External rotation at 90°: 2        4/5   Internal rotation at 90°: 2          4/5     Additional Strength Details  Empty Can - Positive     Drop Arm - Positive   Manual  12/11 12/12 11/25 11/27 12/4   PROM to Right Shoulder  Performed  NP performed performed performed   UT stretch    30" x3                     10 min NP  x10 min x10 min x10 min       Exercise Diary  12/11 12/12 11/25 11/27 12/4   UBC 90 5'fwd/5'retro 120 5'fwd/5'retro 120 5'fwd/5'retro 120 5'fwd/5'retro 120  5/5   MTP/LTP  Blue 5'' 20x  Blue  5" x20 Green  5" 2x10 Green  5" 2x10 ea Green 5"2x10 ea   Scap Squeezes   5" x20     Shrugs Retractions         Bent Row         TB Flexion, Abduction, ER, IR  Green TB  IR/ER  2x10 ea GreenTB  IRx20   ER x10 Red TB  IR x5  ER  x20  Red TB  IR/ER  2x10ea Red TB  IR/ER  2x10 ea   ITY  5''10x ea    Unable to perform "T" secondary to pain   Flex, scap   5" x10 ea Flex  5" x10 ea Fl/Scap  5"x10 ea   Prone Extension        Prone Flexion         Prone Abduction         Pendulums        Pulley  Flex/Scap 10''10x Flex/scap  10"x10 Flex/scap  10" x10 ea Flex/scap  10" x10 ea F/Scap  10" x 10 ea   Finger Ladder  Flex 5"10x  Flex   5"x10  To 32 Flex   5" x5     Flex 5" x10 Flex 5"x10   Scap Punch 5"x20 B/L  5" x10 B/L 5" x10 B/L 5"x10 B/L   Levator scap stretch    10" x3 10"x3   Delt Row 30# 2x10 30# 2x10                                   Modalities

## 2020-06-03 ENCOUNTER — EVALUATION (OUTPATIENT)
Dept: PHYSICAL THERAPY | Facility: CLINIC | Age: 70
End: 2020-06-03
Payer: MEDICARE

## 2020-06-03 DIAGNOSIS — M25.511 BILATERAL SHOULDER PAIN, UNSPECIFIED CHRONICITY: Primary | ICD-10-CM

## 2020-06-03 DIAGNOSIS — M25.512 BILATERAL SHOULDER PAIN, UNSPECIFIED CHRONICITY: Primary | ICD-10-CM

## 2020-06-03 PROCEDURE — 97162 PT EVAL MOD COMPLEX 30 MIN: CPT | Performed by: PHYSICAL THERAPIST

## 2020-06-04 ENCOUNTER — TRANSCRIBE ORDERS (OUTPATIENT)
Dept: PHYSICAL THERAPY | Facility: CLINIC | Age: 70
End: 2020-06-04

## 2020-06-04 DIAGNOSIS — M25.511 CHRONIC RIGHT SHOULDER PAIN: Primary | ICD-10-CM

## 2020-06-04 DIAGNOSIS — G89.29 CHRONIC RIGHT SHOULDER PAIN: Primary | ICD-10-CM

## 2020-06-08 ENCOUNTER — OFFICE VISIT (OUTPATIENT)
Dept: PHYSICAL THERAPY | Facility: CLINIC | Age: 70
End: 2020-06-08
Payer: MEDICARE

## 2020-06-08 DIAGNOSIS — M25.511 BILATERAL SHOULDER PAIN, UNSPECIFIED CHRONICITY: Primary | ICD-10-CM

## 2020-06-08 DIAGNOSIS — M25.512 BILATERAL SHOULDER PAIN, UNSPECIFIED CHRONICITY: Primary | ICD-10-CM

## 2020-06-08 PROCEDURE — 97110 THERAPEUTIC EXERCISES: CPT

## 2020-06-08 PROCEDURE — 97140 MANUAL THERAPY 1/> REGIONS: CPT

## 2020-06-10 ENCOUNTER — OFFICE VISIT (OUTPATIENT)
Dept: PHYSICAL THERAPY | Facility: CLINIC | Age: 70
End: 2020-06-10
Payer: MEDICARE

## 2020-06-10 DIAGNOSIS — M25.511 BILATERAL SHOULDER PAIN, UNSPECIFIED CHRONICITY: Primary | ICD-10-CM

## 2020-06-10 DIAGNOSIS — M25.512 BILATERAL SHOULDER PAIN, UNSPECIFIED CHRONICITY: Primary | ICD-10-CM

## 2020-06-10 PROCEDURE — 97140 MANUAL THERAPY 1/> REGIONS: CPT | Performed by: PHYSICAL THERAPIST

## 2020-06-10 PROCEDURE — 97110 THERAPEUTIC EXERCISES: CPT | Performed by: PHYSICAL THERAPIST

## 2020-06-15 ENCOUNTER — APPOINTMENT (OUTPATIENT)
Dept: PHYSICAL THERAPY | Facility: CLINIC | Age: 70
End: 2020-06-15
Payer: MEDICARE

## 2020-06-17 ENCOUNTER — OFFICE VISIT (OUTPATIENT)
Dept: PHYSICAL THERAPY | Facility: CLINIC | Age: 70
End: 2020-06-17
Payer: MEDICARE

## 2020-06-17 DIAGNOSIS — M25.512 BILATERAL SHOULDER PAIN, UNSPECIFIED CHRONICITY: Primary | ICD-10-CM

## 2020-06-17 DIAGNOSIS — M25.511 BILATERAL SHOULDER PAIN, UNSPECIFIED CHRONICITY: Primary | ICD-10-CM

## 2020-06-17 PROCEDURE — 97140 MANUAL THERAPY 1/> REGIONS: CPT

## 2020-06-17 PROCEDURE — 97110 THERAPEUTIC EXERCISES: CPT

## 2020-06-22 ENCOUNTER — OFFICE VISIT (OUTPATIENT)
Dept: PHYSICAL THERAPY | Facility: CLINIC | Age: 70
End: 2020-06-22
Payer: MEDICARE

## 2020-06-22 DIAGNOSIS — M25.512 BILATERAL SHOULDER PAIN, UNSPECIFIED CHRONICITY: Primary | ICD-10-CM

## 2020-06-22 DIAGNOSIS — M25.511 BILATERAL SHOULDER PAIN, UNSPECIFIED CHRONICITY: Primary | ICD-10-CM

## 2020-06-22 PROCEDURE — 97110 THERAPEUTIC EXERCISES: CPT | Performed by: PHYSICAL THERAPIST

## 2020-06-24 ENCOUNTER — OFFICE VISIT (OUTPATIENT)
Dept: PHYSICAL THERAPY | Facility: CLINIC | Age: 70
End: 2020-06-24
Payer: MEDICARE

## 2020-06-24 DIAGNOSIS — M25.511 BILATERAL SHOULDER PAIN, UNSPECIFIED CHRONICITY: Primary | ICD-10-CM

## 2020-06-24 DIAGNOSIS — M25.512 BILATERAL SHOULDER PAIN, UNSPECIFIED CHRONICITY: Primary | ICD-10-CM

## 2020-06-24 PROCEDURE — 97140 MANUAL THERAPY 1/> REGIONS: CPT

## 2020-06-24 PROCEDURE — 97110 THERAPEUTIC EXERCISES: CPT

## 2020-06-29 ENCOUNTER — APPOINTMENT (OUTPATIENT)
Dept: PHYSICAL THERAPY | Facility: CLINIC | Age: 70
End: 2020-06-29
Payer: MEDICARE

## 2022-06-10 ENCOUNTER — HOSPITAL ENCOUNTER (EMERGENCY)
Facility: HOSPITAL | Age: 72
Discharge: HOME | End: 2022-06-10
Attending: EMERGENCY MEDICINE | Admitting: EMERGENCY MEDICINE
Payer: MEDICARE

## 2022-06-10 VITALS
HEART RATE: 61 BPM | RESPIRATION RATE: 20 BRPM | TEMPERATURE: 97.7 F | SYSTOLIC BLOOD PRESSURE: 166 MMHG | BODY MASS INDEX: 23.86 KG/M2 | WEIGHT: 180 LBS | DIASTOLIC BLOOD PRESSURE: 86 MMHG | OXYGEN SATURATION: 98 % | HEIGHT: 73 IN

## 2022-06-10 DIAGNOSIS — R33.9 URINARY RETENTION: Primary | ICD-10-CM

## 2022-06-10 LAB
BACTERIA UR QL AUTO: ABNORMAL /HPF
BILIRUB UR QL STRIP: NEGATIVE
CLARITY UR: CLEAR
COLOR UR: YELLOW
GLUCOSE UR STRIP-MCNC: NEGATIVE MG/DL
HGB UR QL STRIP.AUTO: ABNORMAL
KETONES UR STRIP-MCNC: NEGATIVE MG/DL
LEUKOCYTE ESTERASE UR QL STRIP: NEGATIVE
MUCOUS THREADS UR QL AUTO: ABNORMAL
NITRITE UR QL STRIP: NEGATIVE
NON-SQ EPI CELLS URNS QL MICRO: ABNORMAL /HPF
PH UR STRIP.AUTO: 5.5 [PH]
PROT UR STRIP-MCNC: NEGATIVE MG/DL
RBC #/AREA URNS AUTO: ABNORMAL /HPF
SP GR UR STRIP.AUTO: 1.02 (ref 1–1.03)
UROBILINOGEN UR QL STRIP.AUTO: 0.2 E.U./DL
WBC #/AREA URNS AUTO: ABNORMAL /HPF

## 2022-06-10 PROCEDURE — 99284 EMERGENCY DEPT VISIT MOD MDM: CPT

## 2022-06-10 PROCEDURE — 99284 EMERGENCY DEPT VISIT MOD MDM: CPT | Performed by: EMERGENCY MEDICINE

## 2022-06-10 PROCEDURE — 81001 URINALYSIS AUTO W/SCOPE: CPT | Performed by: EMERGENCY MEDICINE

## 2022-06-10 NOTE — ED PROVIDER NOTES
History  Chief Complaint   Patient presents with    Urinary Retention           Abdominal Pain     Patient is a 71-year-old male who presents for evaluation of urinary retention  Patient says that he has not been able to urinate for the past 4 hours  Patient says that he is having lower abdominal pressure and pain due to it  Patient says that over last few days he does feel as though he has had to "push more" to urinate  He denies any fevers, chills, nausea, vomiting, flank pain or back pain     Denies any testicular pain, testicular swelling or penis swelling  Patient has never required a Hernandez catheter in the past for urinary retention          Prior to Admission Medications   Prescriptions Last Dose Informant Patient Reported? Taking?    Melatonin 10 MG TABS   Yes No   Sig: Take 30 mg by mouth daily at bedtime    NON FORMULARY   Yes No   Sig: Medical marijuana- 1-2 caps daily   acetaminophen (TYLENOL) 500 mg tablet   Yes No   Sig: Take 500-1,000 mg by mouth every 6 (six) hours as needed for mild pain   gabapentin (NEURONTIN) 300 mg capsule Not Taking at Unknown time  Yes No   Sig: Take 300 mg by mouth daily at bedtime     Patient not taking: Reported on 6/10/2022   lisinopril (ZESTRIL) 20 mg tablet   Yes No   Sig: Take 20 mg by mouth daily at bedtime   pramipexole (MIRAPEX) 0 5 mg tablet Not Taking at Unknown time  Yes No   Sig: Take 0 5 mg by mouth daily at bedtime     Patient not taking: Reported on 6/10/2022   ranitidine (ZANTAC) 150 MG capsule Not Taking at Unknown time  Yes No   Sig: Take 150 mg by mouth every evening dinner   Patient not taking: Reported on 6/10/2022   rosuvastatin (CRESTOR) 20 MG tablet   Yes No   Sig: Take 20 mg by mouth every evening   tiZANidine (ZANAFLEX) 4 mg tablet Not Taking at Unknown time  No No   Sig: Take 1 tablet (4 mg total) by mouth every 8 (eight) hours as needed for muscle spasms   Patient not taking: Reported on 6/10/2022   zolpidem (AMBIEN) 5 mg tablet Not Taking at Unknown time  Yes No   Sig: Take 5 mg by mouth daily at bedtime as needed for sleep   Patient not taking: Reported on 6/10/2022      Facility-Administered Medications: None       Past Medical History:   Diagnosis Date    Anemia     Anesthesia complication     woke up during surgery-orthopedic surgery LLE    Anxiety     Arthritis     b/l knees, hips, neck    Bee sting allergy     CPAP (continuous positive airway pressure) dependence     DDD (degenerative disc disease), lumbar     Degenerative disc disease, cervical     Depression     GERD (gastroesophageal reflux disease)     Headache     history of silent migraines    History of deep vein thrombosis (DVT) of lower extremity     LLE    Hyperlipidemia     Hypertension     Knee pain, bilateral     Lumbar herniated disc     sciatica on right    Myoclonic jerking while sleeping     Pneumonia     x3     Sleep apnea     Spinal stenosis, lumbar region, with neurogenic claudication     TIA (transient ischemic attack) 04/19/2018    No residual problems    Tremor     Etiology unknown   Affects "whole body"    Use of cane as ambulatory aid     Wears dentures     full upper    Wears glasses        Past Surgical History:   Procedure Laterality Date    BACK SURGERY  1987     laminectomy    COLONOSCOPY      FRACTURE SURGERY Left     L tib/fib fx    HERNIA REPAIR      INGUINAL HERNIA REPAIR      JOINT REPLACEMENT Left     TKR    KNEE ARTHROSCOPY Left     LUMBAR FUSION N/A 5/16/2019    Procedure: MIS L5/S1 PLIF; PERCUTANEOUS INSTRUMENTATION L3-S1; PSF L5/S1;  LAMINECTOMY L$/5, L5/S1;  Surgeon: Homero Cleveland MD;  [facility];  Service: Orthopedics    LUMBAR FUSION N/A 5/16/2019    Procedure: TRANSPSOAS ALIF L3/4, L4/5;  Surgeon: Homero Cleveland MD;  [facility];  Service: Orthopedics    [address] [place] [address] N/A 7/25/2017    Procedure: PARTIAL INFERIOR TURBINATE REDUCTION WITH OUTFRACTURE;  Surgeon: Kerry Cuevas DO; [facility];  Service: ENT    [place] NASAL/SINUS ENDOSCOPY,RMV TISS MAXILL SINUS N/A 2018    Procedure: LEFT MAXILLARY SINUS ANTROSOTOMY FUNCTIONAL ENDOSCOPIC SINUS SURGERY;  Surgeon: Soila Rodriguez DO;  [facility];  Service: ENT    [place] REPAIR OF NASAL SEPTUM N/A 2017    Procedure: SEPTOPLASTY WITH POSSIBLE RECONSTRUCTION;  Surgeon: Soila Rodriguez DO;  [facility];  Service: ENT    TOTAL KNEE ARTHROPLASTY Left 2018    Procedure: ARTHROPLASTY KNEE TOTAL;  Surgeon: Karel Holt MD;  [facility];  Service: Orthopedics    UMBILICAL HERNIA REPAIR         No family history on file  I have reviewed and agree with the history as documented  E-Cigarette/Vaping     E-Cigarette/Vaping Substances     Social History     Tobacco Use    Smoking status: Former Smoker     Packs/day: 0 50     Years: 50 00     Pack years: 25 00     Types: Cigarettes     Quit date: 2017     Years since quittin 7    Smokeless tobacco: Never Used   Substance Use Topics    Alcohol use: Yes     Comment: wine    Drug use: Yes     Types: Marijuana     Comment: uses medical marijuana       Review of Systems   Constitutional: Negative for chills, fever and unexpected weight change  HENT: Negative for congestion, sore throat and trouble swallowing  Eyes: Negative for pain, discharge and itching  Respiratory: Negative for cough, chest tightness, shortness of breath and wheezing  Cardiovascular: Negative for chest pain, palpitations and leg swelling  Gastrointestinal: Positive for abdominal pain (lower, suprapubic)  Negative for blood in stool, diarrhea, nausea and vomiting  Endocrine: Negative for polyuria  Genitourinary: Positive for difficulty urinating  Negative for dysuria, frequency and hematuria  Musculoskeletal: Negative for arthralgias and back pain  Neurological: Negative for dizziness, syncope, weakness, light-headedness and headaches         Physical Exam  Physical Exam  Vitals and nursing note reviewed  Constitutional:       General: He is not in acute distress  Appearance: He is well-developed  HENT:      Head: Normocephalic and atraumatic  Right Ear: External ear normal       Left Ear: External ear normal    Eyes:      Conjunctiva/sclera: Conjunctivae normal       Pupils: Pupils are equal, round, and reactive to light  Cardiovascular:      Rate and Rhythm: Normal rate and regular rhythm  Heart sounds: Normal heart sounds  No murmur heard  No friction rub  No gallop  Pulmonary:      Effort: Pulmonary effort is normal  No respiratory distress  Breath sounds: Normal breath sounds  No wheezing or rales  Abdominal:      General: Bowel sounds are normal  There is no distension  Palpations: Abdomen is soft  Tenderness: There is abdominal tenderness in the suprapubic area  There is no guarding  Genitourinary:     Penis: Normal        Testes: Normal    Musculoskeletal:         General: No tenderness or deformity  Normal range of motion  Cervical back: Normal range of motion  Lymphadenopathy:      Cervical: No cervical adenopathy  Skin:     General: Skin is warm and dry  Neurological:      General: No focal deficit present  Mental Status: He is alert and oriented to person, place, and time  Mental status is at baseline  Cranial Nerves: No cranial nerve deficit  Sensory: No sensory deficit  Motor: No weakness or abnormal muscle tone     Psychiatric:         Behavior: Behavior normal          Vital Signs  ED Triage Vitals [06/10/22 1819]   Temperature Pulse Respirations Blood Pressure SpO2   97 7 °F (36 5 °C) 61 20 166/86 98 %      Temp Source Heart Rate Source Patient Position - Orthostatic VS BP Location FiO2 (%)   Temporal Monitor Sitting Right arm --      Pain Score       7           Vitals:    06/10/22 1819   BP: 166/86   Pulse: 61   Patient Position - Orthostatic VS: Sitting         Visual Acuity      ED Medications  Medications - No data to display    Diagnostic Studies  Results Reviewed     Procedure Component Value Units Date/Time    Urine Microscopic [[id number]]  (Abnormal) Collected: 06/10/22 1847    Lab Status: Final result Specimen: Urine, Clean Catch Updated: 06/10/22 1916     RBC, UA 0-1 /hpf      WBC, UA None Seen /hpf      Epithelial Cells None Seen /hpf      Bacteria, UA None Seen /hpf      MUCUS THREADS Occasional    UA w Reflex to Microscopic w Reflex to Culture [[id number]]  (Abnormal) Collected: 06/10/22 1847    Lab Status: Final result Specimen: Urine, Clean Catch Updated: 06/10/22 1854     Color, UA Yellow     Clarity, UA Clear     Specific [place], UA 1 020     pH, UA 5 5     Leukocytes, UA Negative     Nitrite, UA Negative     Protein, UA Negative mg/dl      Glucose, UA Negative mg/dl      Ketones, UA Negative mg/dl      Urobilinogen, UA 0 2 E U /dl      Bilirubin, UA Negative     Blood, UA Trace-lysed                 No orders to display              Procedures  Procedures         ED Course  ED Course as of 06/10/22 2144   Fri Connor 10, 2022   1858 Patient's symptoms have resolved after Hernandez catheter was placed  He has no abdominal tenderness on exam                                             MDM  Number of Diagnoses or Management Options  Urinary retention  Diagnosis management comments: 45-year-old male presenting for evaluation of difficulty urinating  Has not been able to urinate for the past 4 hours  Over last few days has had to push more to urinate  Admits to suprapubic abdominal pressure  Denies fevers, chills, back pain  Bedside ultrasound showed distended bladder  Hernandez was inserted  Patient's symptoms relieved after Hernandez placed  UA shows no signs of infection  Patient discharged,  Urology referral placed        Disposition  Final diagnoses:   Urinary retention     Time reflects when diagnosis was documented in both MDM as applicable and the Disposition within this note Time User Action Codes Description Comment    6/10/2022  7:01 PM Jackson Chilel Add [R33 9] Urinary retention       ED Disposition     ED Disposition   Discharge    Condition   Stable    Date/Time   Fri Connor 10, 2022  7:01 PM    Comment   Gena Novel discharge to home/self care                 Follow-up Information     Follow up With Specialties Details Why Contact Aston Gomez MD Urology Schedule an appointment as soon as possible for a visit  For follow up of urinary retention and san catheter [address] [place] [address]  [phone]            Discharge Medication List as of 6/10/2022  7:17 PM      CONTINUE these medications which have NOT CHANGED    Details   acetaminophen (TYLENOL) 500 mg tablet Take 500-1,000 mg by mouth every 6 (six) hours as needed for mild pain, Historical Med      gabapentin (NEURONTIN) 300 mg capsule Take 300 mg by mouth daily at bedtime  , Starting Wed 1/17/2018, Historical Med      lisinopril (ZESTRIL) 20 mg tablet Take 20 mg by mouth daily at bedtime, Historical Med      Melatonin 10 MG TABS Take 30 mg by mouth daily at bedtime , Historical Med      NON FORMULARY Medical marijuana- 1-2 caps daily, Historical Med      pramipexole (MIRAPEX) 0 5 mg tablet Take 0 5 mg by mouth daily at bedtime  , Starting Mon 1/22/2018, Historical Med      ranitidine (ZANTAC) 150 MG capsule Take 150 mg by mouth every evening dinner, Historical Med      rosuvastatin (CRESTOR) 20 MG tablet Take 20 mg by mouth every evening, Historical Med      tiZANidine (ZANAFLEX) 4 mg tablet Take 1 tablet (4 mg total) by mouth every 8 (eight) hours as needed for muscle spasms, Starting Thu 5/16/2019, Print      zolpidem (AMBIEN) 5 mg tablet Take 5 mg by mouth daily at bedtime as needed for sleep, Historical Med                 PDMP Review     None          ED Provider  Electronically Signed by           Elissa Crawford DO  06/10/22 1682

## 2022-06-10 NOTE — ED NOTES
I placed a leg bag on the patient and educated him on how to change between bags as well as empty them  Patient demonstrated understanding        Yue Patel, RN  06/10/22 5431

## 2022-07-01 ENCOUNTER — LAB REQUISITION (OUTPATIENT)
Dept: LAB | Facility: HOSPITAL | Age: 72
End: 2022-07-01
Payer: MEDICARE

## 2022-07-01 DIAGNOSIS — N39.0 URINARY TRACT INFECTION, SITE NOT SPECIFIED: ICD-10-CM

## 2022-07-01 PROCEDURE — 87086 URINE CULTURE/COLONY COUNT: CPT | Performed by: UROLOGY

## 2022-07-01 PROCEDURE — 87186 SC STD MICRODIL/AGAR DIL: CPT | Performed by: UROLOGY

## 2022-07-04 LAB
BACTERIA UR CULT: ABNORMAL
BACTERIA UR CULT: ABNORMAL

## 2023-08-24 ENCOUNTER — HOSPITAL ENCOUNTER (OUTPATIENT)
Dept: ULTRASOUND IMAGING | Facility: HOSPITAL | Age: 73
End: 2023-08-24
Attending: UROLOGY
Payer: MEDICARE

## 2023-08-24 DIAGNOSIS — R97.20 ELEVATED PROSTATE SPECIFIC ANTIGEN (PSA): ICD-10-CM

## 2023-08-24 PROCEDURE — 55700 HB BIOPSY OF PROSTATE: CPT

## 2023-08-24 PROCEDURE — G0416 PROSTATE BIOPSY, ANY MTHD: HCPCS | Performed by: PATHOLOGY

## 2023-08-24 PROCEDURE — 88344 IMHCHEM/IMCYTCHM EA MLT ANTB: CPT | Performed by: PATHOLOGY

## 2023-08-24 PROCEDURE — 76942 ECHO GUIDE FOR BIOPSY: CPT

## 2023-08-24 RX ORDER — LIDOCAINE HYDROCHLORIDE 10 MG/ML
10 INJECTION, SOLUTION EPIDURAL; INFILTRATION; INTRACAUDAL; PERINEURAL ONCE
Status: COMPLETED | OUTPATIENT
Start: 2023-08-24 | End: 2023-08-24

## 2023-08-24 RX ADMIN — LIDOCAINE HYDROCHLORIDE 10 ML: 10 INJECTION, SOLUTION EPIDURAL; INFILTRATION; INTRACAUDAL; PERINEURAL at 09:18

## 2023-08-24 NOTE — OP NOTE
OPERATIVE REPORT  PATIENT NAME: Terry Ibrahim    :  1950  MRN: [id number]  Pt Location: [facility] ultrasound department    SURGERY DATE: 2023    Preop Diagnosis:  Elevated PSA    Postop diagnosis:  Elevated PSA    Procedure:  Transrectal ultrasound with prostate biopsies    Specimen(s):  Prostate biopsies    Estimated Blood Loss:   Scant    Drains:  Urethral Catheter Double-lumen 16 Fr. (Active)   Number of days: 440     Anesthesia Type:   Local    Operative Indications: This is a 71-year-old male presenting with an elevated PSA of 14.03. Options, procedures, benefits and risks were discussed and he agreed to the planned procedure. He took the routine Keflex 500 mg and Cipro 500 mg prep along with Fleet enema. Operative Findings:  Prostate measuring 73.69 mL. No suspicious lesions. Complications:   None    Procedure and Technique:  The patient was properly identified in the ultrasound department and placed in the left lateral decubitus position. Transrectal ultrasound probe was passed per rectum and ultrasonography was performed with findings as above. 3 cc of lidocaine was infiltrated beyond the base of the prostate bilaterally. Sextant biopsies were obtained with a needle biopsy gun taking 2 biopsies from each area. He tolerated the procedure well. The probe was removed. Instructions were detailed and he left the department in good condition. I was present for the entire procedure.     Patient Disposition:  The patient left the ultrasound department in good condition      SIGNATURE: Heron Dill MD  DATE: 2023  TIME: 9:16 AM

## 2023-08-28 PROCEDURE — G0416 PROSTATE BIOPSY, ANY MTHD: HCPCS | Performed by: PATHOLOGY

## 2023-08-28 PROCEDURE — 88344 IMHCHEM/IMCYTCHM EA MLT ANTB: CPT | Performed by: PATHOLOGY

## 2023-10-02 ENCOUNTER — LAB REQUISITION (OUTPATIENT)
Dept: LAB | Facility: HOSPITAL | Age: 73
End: 2023-10-02

## 2023-10-02 DIAGNOSIS — C61 MALIGNANT NEOPLASM OF PROSTATE (HCC): ICD-10-CM

## 2023-10-02 DIAGNOSIS — R97.20 ELEVATED PROSTATE SPECIFIC ANTIGEN (PSA): ICD-10-CM

## 2023-10-02 LAB — SCAN RESULT: NORMAL

## 2024-02-19 ENCOUNTER — APPOINTMENT (OUTPATIENT)
Age: 74
End: 2024-02-19
Payer: MEDICARE

## 2024-02-19 DIAGNOSIS — C61 MALIGNANT NEOPLASM OF PROSTATE (HCC): ICD-10-CM

## 2024-02-19 LAB
PSA SERPL-MCNC: 5.9 NG/ML (ref 0–4)
TESTOST SERPL-MSCNC: 217 NG/DL

## 2024-02-19 PROCEDURE — G0103 PSA SCREENING: HCPCS

## 2024-02-19 PROCEDURE — 36415 COLL VENOUS BLD VENIPUNCTURE: CPT

## 2024-02-19 PROCEDURE — 84403 ASSAY OF TOTAL TESTOSTERONE: CPT

## 2024-06-20 ENCOUNTER — APPOINTMENT (OUTPATIENT)
Age: 74
End: 2024-06-20
Payer: MEDICARE

## 2024-06-20 DIAGNOSIS — C61 MALIGNANT NEOPLASM OF PROSTATE (HCC): ICD-10-CM

## 2024-06-20 LAB
PSA FREE MFR SERPL: 16.07 %
PSA FREE SERPL-MCNC: 1.01 NG/ML
PSA SERPL-MCNC: 6.3 NG/ML (ref 0–4)

## 2024-06-20 PROCEDURE — 84154 ASSAY OF PSA FREE: CPT

## 2024-06-20 PROCEDURE — 36415 COLL VENOUS BLD VENIPUNCTURE: CPT

## 2024-06-20 PROCEDURE — 84153 ASSAY OF PSA TOTAL: CPT

## 2024-08-22 ENCOUNTER — HOSPITAL ENCOUNTER (OUTPATIENT)
Dept: ULTRASOUND IMAGING | Facility: HOSPITAL | Age: 74
End: 2024-08-22
Payer: MEDICARE

## 2024-08-22 DIAGNOSIS — C61 PROSTATE CARCINOMA (HCC): ICD-10-CM

## 2024-08-22 PROCEDURE — 55700 HB BIOPSY OF PROSTATE: CPT

## 2024-08-22 PROCEDURE — 76942 ECHO GUIDE FOR BIOPSY: CPT

## 2024-08-22 PROCEDURE — G0416 PROSTATE BIOPSY, ANY MTHD: HCPCS | Performed by: PATHOLOGY

## 2024-08-22 PROCEDURE — 88344 IMHCHEM/IMCYTCHM EA MLT ANTB: CPT | Performed by: PATHOLOGY

## 2024-08-22 RX ORDER — LIDOCAINE HYDROCHLORIDE 10 MG/ML
10 INJECTION, SOLUTION EPIDURAL; INFILTRATION; INTRACAUDAL; PERINEURAL ONCE
Status: DISCONTINUED | OUTPATIENT
Start: 2024-08-22 | End: 2024-08-26 | Stop reason: HOSPADM

## 2024-08-22 NOTE — OP NOTE
OPERATIVE REPORT  PATIENT NAME: Derian Thomas    :  1950  MRN: [id number]  Pt Location: [facility] ultrasound department    SURGERY DATE: 2024    Preop Diagnosis:  Prostate carcinoma on active surveillance    Postop diagnosis:  Prostate carcinoma on active surveillance    Procedure:  Transrectal ultrasound with prostate biopsies    Specimen(s):  Prostate biopsies    Estimated Blood Loss:   Scant    Anesthesia Type:   Local    Operative Indications:  * No surgery found *  This is a 74-year-old male with a history of prostate carcinoma with biopsy 2023 showing adenocarcinoma of the prostate in the left apex [place] score 6 in 10% of the specimen.  He since has been followed with active surveillance.  PSA has been fluctuating, but relatively stable within the range.  Last PSA 6.3 2024.  Options, procedures, benefits and risks were discussed and he agreed to the planned procedure.  He received the routine Cipro and Keflex prep with Fleet enema.    Operative Findings:  Prostate measuring 95.93 mL.  No suspicious lesions.      Complications:   None    Procedure and Technique:  The patient was properly identified in the ultrasound department and placed in the left lateral decubitus position.  Transrectal ultrasound probe was passed per rectum and ultrasonography was performed with findings as above.  3 cc of lidocaine was infiltrated beyond the base of the prostate bilaterally.  Sextant biopsies were obtained with a needle biopsy gun taking 2 biopsies from each area.  An extra biopsy was obtained from the left apex.  He tolerated the procedure well.  The probe was removed.  Instructions were detailed and he left the department in good condition.   I was present for the entire procedure.    Patient Disposition:  The patient left the ultrasound department in good condition.        SIGNATURE: Noble Ward MD  DATE: 2024  TIME: 9:17 AM

## 2024-08-26 PROCEDURE — 88344 IMHCHEM/IMCYTCHM EA MLT ANTB: CPT | Performed by: PATHOLOGY

## 2024-08-26 PROCEDURE — G0416 PROSTATE BIOPSY, ANY MTHD: HCPCS | Performed by: PATHOLOGY

## 2025-07-17 ENCOUNTER — APPOINTMENT (OUTPATIENT)
Age: 75
End: 2025-07-17
Attending: INTERNAL MEDICINE
Payer: MEDICARE

## 2025-07-17 DIAGNOSIS — M19.90 INFLAMMATORY ARTHRITIS: ICD-10-CM

## 2025-07-17 LAB
ALBUMIN SERPL BCG-MCNC: 4.2 G/DL (ref 3.5–5)
ALP SERPL-CCNC: 45 U/L (ref 34–104)
ALT SERPL W P-5'-P-CCNC: 14 U/L (ref 7–52)
ANION GAP SERPL CALCULATED.3IONS-SCNC: 9 MMOL/L (ref 4–13)
AST SERPL W P-5'-P-CCNC: 19 U/L (ref 13–39)
BASOPHILS # BLD AUTO: 0.05 THOUSANDS/ÂΜL (ref 0–0.1)
BASOPHILS NFR BLD AUTO: 1 % (ref 0–1)
BILIRUB SERPL-MCNC: 0.72 MG/DL (ref 0.2–1)
BUN SERPL-MCNC: 18 MG/DL (ref 5–25)
CALCIUM SERPL-MCNC: 9.5 MG/DL (ref 8.4–10.2)
CHLORIDE SERPL-SCNC: 101 MMOL/L (ref 96–108)
CO2 SERPL-SCNC: 29 MMOL/L (ref 21–32)
CREAT SERPL-MCNC: 0.86 MG/DL (ref 0.6–1.3)
CRP SERPL QL: 6.2 MG/L
EOSINOPHIL # BLD AUTO: 0.07 THOUSAND/ÂΜL (ref 0–0.61)
EOSINOPHIL NFR BLD AUTO: 1 % (ref 0–6)
ERYTHROCYTE [DISTWIDTH] IN BLOOD BY AUTOMATED COUNT: 13.3 % (ref 11.6–15.1)
ERYTHROCYTE [SEDIMENTATION RATE] IN BLOOD: 7 MM/HOUR (ref 0–19)
GFR SERPL CREATININE-BSD FRML MDRD: 85 ML/MIN/1.73SQ M
GLUCOSE P FAST SERPL-MCNC: 96 MG/DL (ref 65–99)
HCT VFR BLD AUTO: 47.1 % (ref 36.5–49.3)
HGB BLD-MCNC: 15.6 G/DL (ref 12–17)
IMM GRANULOCYTES # BLD AUTO: 0.14 THOUSAND/UL (ref 0–0.2)
IMM GRANULOCYTES NFR BLD AUTO: 2 % (ref 0–2)
LYMPHOCYTES # BLD AUTO: 0.64 THOUSANDS/ÂΜL (ref 0.6–4.47)
LYMPHOCYTES NFR BLD AUTO: 7 % (ref 14–44)
MCH RBC QN AUTO: 31.2 PG (ref 26.8–34.3)
MCHC RBC AUTO-ENTMCNC: 33.1 G/DL (ref 31.4–37.4)
MCV RBC AUTO: 94 FL (ref 82–98)
MONOCYTES # BLD AUTO: 0.45 THOUSAND/ÂΜL (ref 0.17–1.22)
MONOCYTES NFR BLD AUTO: 5 % (ref 4–12)
NEUTROPHILS # BLD AUTO: 7.73 THOUSANDS/ÂΜL (ref 1.85–7.62)
NEUTS SEG NFR BLD AUTO: 84 % (ref 43–75)
NRBC BLD AUTO-RTO: 0 /100 WBCS
PLATELET # BLD AUTO: 223 THOUSANDS/UL (ref 149–390)
PMV BLD AUTO: 10.8 FL (ref 8.9–12.7)
POTASSIUM SERPL-SCNC: 3.9 MMOL/L (ref 3.5–5.3)
PROT SERPL-MCNC: 6.6 G/DL (ref 6.4–8.4)
RBC # BLD AUTO: 5 MILLION/UL (ref 3.88–5.62)
SODIUM SERPL-SCNC: 139 MMOL/L (ref 135–147)
WBC # BLD AUTO: 9.08 THOUSAND/UL (ref 4.31–10.16)

## 2025-07-17 PROCEDURE — 85652 RBC SED RATE AUTOMATED: CPT

## 2025-07-17 PROCEDURE — 86140 C-REACTIVE PROTEIN: CPT

## 2025-07-17 PROCEDURE — 80053 COMPREHEN METABOLIC PANEL: CPT

## 2025-07-17 PROCEDURE — 85025 COMPLETE CBC W/AUTO DIFF WBC: CPT

## 2025-07-17 PROCEDURE — 36415 COLL VENOUS BLD VENIPUNCTURE: CPT

## (undated) DEVICE — GLOVE SRG BIOGEL 7

## (undated) DEVICE — SYRINGE 10ML LL

## (undated) DEVICE — NEURO PATTIES 1/2 X 3

## (undated) DEVICE — STERILE NASAL PACK: Brand: CARDINAL HEALTH

## (undated) DEVICE — STERI DRAPE 1000 NON-STERILE ROLL

## (undated) DEVICE — INTENDED FOR TISSUE SEPARATION, AND OTHER PROCEDURES THAT REQUIRE A SHARP SURGICAL BLADE TO PUNCTURE OR CUT.: Brand: BARD-PARKER SAFETY BLADES SIZE 10, STERILE

## (undated) DEVICE — WEBRIL 6 IN UNSTERILE

## (undated) DEVICE — COBAN 6 IN STERILE

## (undated) DEVICE — ANTIBACTERIAL UNDYED BRAIDED (POLYGLACTIN 910), SYNTHETIC ABSORBABLE SUTURE: Brand: COATED VICRYL

## (undated) DEVICE — NEURO PATTIES 1/2 X 1

## (undated) DEVICE — PADDING CAST 6IN COTTON STRL

## (undated) DEVICE — TOOL 14MH30 LEGEND 14CM 3MM: Brand: MIDAS REX ™

## (undated) DEVICE — HEAVY DUTY TABLE COVER: Brand: CONVERTORS

## (undated) DEVICE — BETHLEHEM UNIVERSAL SPINE, KIT: Brand: CARDINAL HEALTH

## (undated) DEVICE — SPONGE LAP 18 X 4 IN STRL RFD

## (undated) DEVICE — ACE WRAP 6 IN UNSTERILE

## (undated) DEVICE — HOOD: Brand: FLYTE

## (undated) DEVICE — SYRINGE 10ML LL CONTROL TOP

## (undated) DEVICE — ENDOPATH 5MM ENDOSCOPIC BLUNT TIP DISSECTORS (12 POUCHES CONTAINING 3 DISSECTORS EACH): Brand: ENDOPATH

## (undated) DEVICE — GLOVE SRG BIOGEL ORTHOPEDIC 7

## (undated) DEVICE — WAND COBLATION REFLEX ULTRA 45

## (undated) DEVICE — ADHESIVE SKN CLSR HISTOACRYL FLEX 0.5ML LF

## (undated) DEVICE — GLOVE SRG BIOGEL 8

## (undated) DEVICE — NEEDLE 18 G X 1 1/2

## (undated) DEVICE — DRESSING MEPILEX AG BORDER 4 X 12 IN

## (undated) DEVICE — GLOVE INDICATOR PI UNDERGLOVE SZ 8 BLUE

## (undated) DEVICE — TRAY FOLEY 16FR URIMETER SURESTEP

## (undated) DEVICE — 2000CC GUARDIAN II: Brand: GUARDIAN

## (undated) DEVICE — SUT VICRYL 1 CTX 36 IN J977H

## (undated) DEVICE — PADDING CAST 4 IN  COTTON STRL

## (undated) DEVICE — GLOVE SRG BIOGEL ECLIPSE 7

## (undated) DEVICE — ANTI-FOG SOLUTION WITH FOAM PAD: Brand: DEVON

## (undated) DEVICE — ABDOMINAL PAD: Brand: DERMACEA

## (undated) DEVICE — SPONGE LAP 18 X 18 IN

## (undated) DEVICE — INTENDED FOR TISSUE SEPARATION, AND OTHER PROCEDURES THAT REQUIRE A SHARP SURGICAL BLADE TO PUNCTURE OR CUT.: Brand: BARD-PARKER ® CARBON RIB-BACK BLADES

## (undated) DEVICE — CUFF TOURNIQUET 30 X 4 IN QUICK CONNECT DISP 1BLA

## (undated) DEVICE — BAG DECANTER

## (undated) DEVICE — SURGICAL GOWN, XL SMARTSLEEVE: Brand: CONVERTORS

## (undated) DEVICE — TUBING SUCTION 5MM X 12 FT

## (undated) DEVICE — NEEDLE 25G X 1 1/2

## (undated) DEVICE — CHLORAPREP HI-LITE 26ML ORANGE

## (undated) DEVICE — CAPIT KNEE ATTUNE FB CEMENT - DEPUY

## (undated) DEVICE — TISSEEL FIBRIN 4ML FROZEN

## (undated) DEVICE — SPLINT 1524055 DOYLE II AIRWAY SET: Brand: DOYLE II ™

## (undated) DEVICE — ASTOUND STANDARD SURGICAL GOWN, XXL: Brand: CONVERTORS

## (undated) DEVICE — GLOVE INDICATOR PI UNDERGLOVE SZ 7.5 BLUE

## (undated) DEVICE — ASTOUND STANDARD SURGICAL GOWN, XL: Brand: CONVERTORS

## (undated) DEVICE — BIPOLAR CORD DISP

## (undated) DEVICE — JACKSON TABLE FOAM POSITIONING KIT: Brand: CARDINAL HEALTH

## (undated) DEVICE — SAW BLADE RECIPROCATING 179

## (undated) DEVICE — TOWEL SURG XR DETECT GREEN STRL RFD

## (undated) DEVICE — GLOVE SRG BIOGEL 8.5

## (undated) DEVICE — STOCKINETTE REGULAR

## (undated) DEVICE — SCD SEQUENTIAL COMPRESSION COMFORT SLEEVE MEDIUM KNEE LENGTH: Brand: KENDALL SCD

## (undated) DEVICE — SUT FIBERWIRE #2 1/2 CIRCLE T-5 38IN AR-7200

## (undated) DEVICE — TIBURON TRANSVERSE LAPAROTOMY SHEET: Brand: CONVERTORS

## (undated) DEVICE — DRAPE C-ARMOUR

## (undated) DEVICE — GAUZE SPONGES,16 PLY: Brand: CURITY

## (undated) DEVICE — CEMENT MIXING CARTRIDGE PRISM II

## (undated) DEVICE — 3M™ TEGADERM™ TRANSPARENT FILM DRESSING FRAME STYLE, 1624W, 2-3/8 IN X 2-3/4 IN (6 CM X 7 CM), 100/CT 4CT/CASE: Brand: 3M™ TEGADERM™

## (undated) DEVICE — SUT STRATAFIX SPIRAL PDS PLUS 1 CTX 18 IN SXPP1A400

## (undated) DEVICE — Device

## (undated) DEVICE — Device: Brand: SPINE SYSTEM

## (undated) DEVICE — PETRI DISH STERILE

## (undated) DEVICE — NEEDLE 22 G X 1 1/2 SAFETY

## (undated) DEVICE — GLOVE INDICATOR PI UNDERGLOVE SZ 6.5 BLUE

## (undated) DEVICE — SKIN MARKER DUAL TIP WITH RULER CAP, FLEXIBLE RULER AND LABELS: Brand: DEVON

## (undated) DEVICE — IMPERVIOUS STOCKINETTE: Brand: DEROYAL

## (undated) DEVICE — 3000CC GUARDIAN II: Brand: GUARDIAN

## (undated) DEVICE — DRAPE LAPAROTOMY W/POUCHES

## (undated) DEVICE — SUT VICRYL PLUS 1 CTX 36 IN VCP371H

## (undated) DEVICE — SUT VICRYL 1 CT-1 27 IN J261H

## (undated) DEVICE — PENCIL ELECTROSURG E-Z CLEAN -0035H

## (undated) DEVICE — 3M™ DURAPORE™ SURGICAL TAPE 1538-3, 3 INCH X 10 YARD (7,5CM X 9,1M), 4 ROLLS/BOX: Brand: 3M™ DURAPORE™

## (undated) DEVICE — THE SIMPULSE SOLO SYSTEM WITH ULTREX RETRACTABLE SPLASH SHIELD TIP: Brand: SIMPULSE SOLO

## (undated) DEVICE — GLOVE SRG BIOGEL 6.5

## (undated) DEVICE — PACK TOTAL KNEE PBDS

## (undated) DEVICE — DRAPE SHEET X-LG

## (undated) DEVICE — PROXIMATE SKIN STAPLERS (35 WIDE) CONTAINS 35 STAINLESS STEEL STAPLES (FIXED HEAD): Brand: PROXIMATE

## (undated) DEVICE — FLOSEAL HEMOSTATIC MATRIX, 10 ML: Brand: FLOSEAL

## (undated) DEVICE — STANDARD SURGICAL GOWN, L: Brand: CONVERTORS

## (undated) DEVICE — COBAN 4 IN STERILE

## (undated) DEVICE — COOL TEMP PAD

## (undated) DEVICE — KAIRISON TUBING SET PNEUMATIC, (3000 MM), STERILE, DISPOSABLE, TO BE USED WITH: FK898R, PACKAGE OF 10 PIECES: Brand: KAIRISON

## (undated) DEVICE — 3M™ STERI-STRIP™ REINFORCED ADHESIVE SKIN CLOSURES, R1547, 1/2 IN X 4 IN (12 MM X 100 MM), 6 STRIPS/ENVELOPE: Brand: 3M™ STERI-STRIP™

## (undated) DEVICE — TWIST DRILL 3.2MM DIA 127.0MM LONG

## (undated) DEVICE — GLOVE INDICATOR PI UNDERGLOVE SZ 7 BLUE

## (undated) DEVICE — SUT MONOCRYL 3-0 PS-2 27 IN Y427H

## (undated) DEVICE — SNAP KOVER: Brand: UNBRANDED

## (undated) DEVICE — IMPLANTABLE DEVICE
Type: IMPLANTABLE DEVICE | Site: SPINE LUMBAR | Status: NON-FUNCTIONAL
Removed: 2019-05-16

## (undated) DEVICE — 3M™ IOBAN™ 2 ANTIMICROBIAL INCISE DRAPE 6648EZ: Brand: IOBAN™ 2

## (undated) DEVICE — NEEDLE HYPO 22G X 1-1/2 IN

## (undated) DEVICE — SURGIFOAM 8.5 X 12.5

## (undated) DEVICE — SAW BLADE OSCILLATING BRAZOL 167

## (undated) DEVICE — SUT VICRYL 2-0 CT-2 27 IN J269H

## (undated) DEVICE — SPECIMEN CONTAINER STERILE PEEL PACK

## (undated) DEVICE — DRESSING MEPILEX AG BORDER 4 X 10 IN

## (undated) DEVICE — SUT CHROMIC 4-0 PS-4 18 IN 1643G

## (undated) DEVICE — 2963 MEDIPORE SOFT CLOTH TAPE 3 IN X 10 YD 12 RLS/CS: Brand: 3M™ MEDIPORE™

## (undated) DEVICE — JP 3-SPRING RES W/10FR PVC DRAIN/TR: Brand: CARDINAL HEALTH

## (undated) DEVICE — GUIDEPIN 27MM

## (undated) DEVICE — PAD GROUNDING ADULT

## (undated) DEVICE — SUT ETHILON 2-0 FS 18 IN 664H

## (undated) DEVICE — TIBURON SPLIT SHEET: Brand: CONVERTORS